# Patient Record
Sex: MALE | Race: WHITE | NOT HISPANIC OR LATINO | Employment: OTHER | URBAN - METROPOLITAN AREA
[De-identification: names, ages, dates, MRNs, and addresses within clinical notes are randomized per-mention and may not be internally consistent; named-entity substitution may affect disease eponyms.]

---

## 2017-01-10 ENCOUNTER — APPOINTMENT (EMERGENCY)
Dept: CT IMAGING | Facility: HOSPITAL | Age: 32
End: 2017-01-10
Payer: COMMERCIAL

## 2017-01-10 ENCOUNTER — HOSPITAL ENCOUNTER (EMERGENCY)
Facility: HOSPITAL | Age: 32
Discharge: HOME | End: 2017-01-10
Attending: EMERGENCY MEDICINE | Admitting: EMERGENCY MEDICINE
Payer: COMMERCIAL

## 2017-01-10 VITALS
HEART RATE: 84 BPM | DIASTOLIC BLOOD PRESSURE: 85 MMHG | TEMPERATURE: 98.5 F | RESPIRATION RATE: 16 BRPM | WEIGHT: 241.4 LBS | OXYGEN SATURATION: 97 % | SYSTOLIC BLOOD PRESSURE: 144 MMHG | BODY MASS INDEX: 32.74 KG/M2

## 2017-01-10 DIAGNOSIS — S01.91XA LACERATION OF HEAD: Primary | ICD-10-CM

## 2017-01-10 PROCEDURE — 90715 TDAP VACCINE 7 YRS/> IM: CPT | Performed by: FAMILY MEDICINE

## 2017-01-10 PROCEDURE — 90471 IMMUNIZATION ADMIN: CPT

## 2017-01-10 PROCEDURE — 70450 CT HEAD/BRAIN W/O DYE: CPT

## 2017-01-10 PROCEDURE — 99284 EMERGENCY DEPT VISIT MOD MDM: CPT

## 2017-01-10 RX ORDER — ACETAMINOPHEN 325 MG/1
975 TABLET ORAL ONCE
Status: DISCONTINUED | OUTPATIENT
Start: 2017-01-10 | End: 2017-01-10

## 2017-01-10 RX ORDER — LIDOCAINE HYDROCHLORIDE 10 MG/ML
10 INJECTION, SOLUTION EPIDURAL; INFILTRATION; INTRACAUDAL; PERINEURAL ONCE
Status: COMPLETED | OUTPATIENT
Start: 2017-01-10 | End: 2017-01-10

## 2017-01-10 RX ADMIN — TETANUS TOXOID, REDUCED DIPHTHERIA TOXOID AND ACELLULAR PERTUSSIS VACCINE, ADSORBED 0.5 ML: 5; 2.5; 8; 8; 2.5 SUSPENSION INTRAMUSCULAR at 14:10

## 2017-01-10 RX ADMIN — LIDOCAINE HYDROCHLORIDE 10 ML: 10 INJECTION, SOLUTION EPIDURAL; INFILTRATION; INTRACAUDAL; PERINEURAL at 13:33

## 2017-01-16 ENCOUNTER — HOSPITAL ENCOUNTER (EMERGENCY)
Facility: HOSPITAL | Age: 32
Discharge: HOME | End: 2017-01-16
Attending: EMERGENCY MEDICINE
Payer: COMMERCIAL

## 2017-01-16 ENCOUNTER — APPOINTMENT (EMERGENCY)
Dept: CT IMAGING | Facility: HOSPITAL | Age: 32
End: 2017-01-16
Payer: COMMERCIAL

## 2017-01-16 VITALS
HEART RATE: 91 BPM | TEMPERATURE: 99.5 F | SYSTOLIC BLOOD PRESSURE: 141 MMHG | RESPIRATION RATE: 20 BRPM | DIASTOLIC BLOOD PRESSURE: 81 MMHG | OXYGEN SATURATION: 97 %

## 2017-01-16 DIAGNOSIS — S09.90XA HEAD INJURY, ACUTE, WITHOUT LOSS OF CONSCIOUSNESS, INITIAL ENCOUNTER: Primary | ICD-10-CM

## 2017-01-16 PROCEDURE — 99283 EMERGENCY DEPT VISIT LOW MDM: CPT

## 2017-01-16 PROCEDURE — 70450 CT HEAD/BRAIN W/O DYE: CPT

## 2017-01-16 RX ORDER — ONDANSETRON 4 MG/1
4 TABLET, ORALLY DISINTEGRATING ORAL ONCE
Status: COMPLETED | OUTPATIENT
Start: 2017-01-16 | End: 2017-01-16

## 2017-01-16 RX ORDER — AMLODIPINE BESYLATE 5 MG/1
5 TABLET ORAL DAILY
COMMUNITY
End: 2018-06-05 | Stop reason: SDUPTHER

## 2017-01-16 RX ORDER — LEVOMEFOLATE CALCIUM 15 MG
15 TABLET ORAL DAILY
COMMUNITY
End: 2018-09-25 | Stop reason: SDUPTHER

## 2017-01-16 RX ADMIN — ONDANSETRON 4 MG: 4 TABLET, ORALLY DISINTEGRATING ORAL at 12:53

## 2017-01-26 ENCOUNTER — ALLSCRIPTS OFFICE VISIT (OUTPATIENT)
Dept: OTHER | Facility: OTHER | Age: 32
End: 2017-01-26

## 2017-02-04 ENCOUNTER — HOSPITAL ENCOUNTER (EMERGENCY)
Facility: HOSPITAL | Age: 32
Discharge: HOME | End: 2017-02-04
Admitting: EMERGENCY MEDICINE
Payer: COMMERCIAL

## 2017-02-04 VITALS
WEIGHT: 225 LBS | OXYGEN SATURATION: 96 % | TEMPERATURE: 98.9 F | BODY MASS INDEX: 30.52 KG/M2 | SYSTOLIC BLOOD PRESSURE: 176 MMHG | DIASTOLIC BLOOD PRESSURE: 119 MMHG | HEART RATE: 103 BPM | RESPIRATION RATE: 18 BRPM

## 2017-02-04 DIAGNOSIS — S01.112A LACERATION OF LEFT EYEBROW, INITIAL ENCOUNTER: Primary | ICD-10-CM

## 2017-02-04 PROCEDURE — 99283 EMERGENCY DEPT VISIT LOW MDM: CPT

## 2017-02-04 RX ORDER — LIDOCAINE HYDROCHLORIDE 10 MG/ML
5 INJECTION, SOLUTION EPIDURAL; INFILTRATION; INTRACAUDAL; PERINEURAL ONCE
Status: COMPLETED | OUTPATIENT
Start: 2017-02-04 | End: 2017-02-04

## 2017-02-04 RX ADMIN — LIDOCAINE HYDROCHLORIDE 5 ML: 10 INJECTION, SOLUTION EPIDURAL; INFILTRATION; INTRACAUDAL; PERINEURAL at 18:11

## 2017-02-14 ENCOUNTER — ALLSCRIPTS OFFICE VISIT (OUTPATIENT)
Dept: OTHER | Facility: OTHER | Age: 32
End: 2017-02-14

## 2017-02-21 ENCOUNTER — GENERIC CONVERSION - ENCOUNTER (OUTPATIENT)
Dept: OTHER | Facility: OTHER | Age: 32
End: 2017-02-21

## 2017-03-01 ENCOUNTER — ALLSCRIPTS OFFICE VISIT (OUTPATIENT)
Dept: OTHER | Facility: OTHER | Age: 32
End: 2017-03-01

## 2017-05-17 ENCOUNTER — OFFICE VISIT (OUTPATIENT)
Dept: URGENT CARE | Facility: CLINIC | Age: 32
End: 2017-05-17
Payer: COMMERCIAL

## 2017-05-17 ENCOUNTER — HOSPITAL ENCOUNTER (OUTPATIENT)
Dept: RADIOLOGY | Facility: CLINIC | Age: 32
Discharge: HOME | End: 2017-05-17
Admitting: EMERGENCY MEDICINE
Payer: COMMERCIAL

## 2017-05-17 DIAGNOSIS — R05.9 COUGH: ICD-10-CM

## 2017-05-17 PROCEDURE — 71020 HB CHEST X-RAY 2VW FRONTAL&LATL: CPT

## 2017-05-17 PROCEDURE — 99283 EMERGENCY DEPT VISIT LOW MDM: CPT

## 2017-05-17 PROCEDURE — G0382 LEV 3 HOSP TYPE B ED VISIT: HCPCS

## 2017-06-22 ENCOUNTER — ALLSCRIPTS OFFICE VISIT (OUTPATIENT)
Dept: OTHER | Facility: OTHER | Age: 32
End: 2017-06-22

## 2017-07-18 ENCOUNTER — ALLSCRIPTS OFFICE VISIT (OUTPATIENT)
Dept: OTHER | Facility: OTHER | Age: 32
End: 2017-07-18

## 2017-08-25 ENCOUNTER — ALLSCRIPTS OFFICE VISIT (OUTPATIENT)
Dept: OTHER | Facility: OTHER | Age: 32
End: 2017-08-25

## 2017-08-29 ENCOUNTER — APPOINTMENT (EMERGENCY)
Dept: RADIOLOGY | Facility: HOSPITAL | Age: 32
End: 2017-08-29
Payer: COMMERCIAL

## 2017-08-29 ENCOUNTER — HOSPITAL ENCOUNTER (EMERGENCY)
Facility: HOSPITAL | Age: 32
Discharge: HOME | End: 2017-08-30
Attending: EMERGENCY MEDICINE
Payer: COMMERCIAL

## 2017-08-29 DIAGNOSIS — F31.9 BIPOLAR 1 DISORDER (HCC): Primary | ICD-10-CM

## 2017-08-29 DIAGNOSIS — F91.3 OPPOSITIONAL DEFIANT DISORDER: ICD-10-CM

## 2017-08-29 DIAGNOSIS — Z72.89 SELF-INJURIOUS BEHAVIOR: ICD-10-CM

## 2017-08-29 DIAGNOSIS — S09.90XA MILD CLOSED HEAD INJURY, INITIAL ENCOUNTER: ICD-10-CM

## 2017-08-29 DIAGNOSIS — F79 MENTAL RETARDATION: ICD-10-CM

## 2017-08-29 DIAGNOSIS — S01.91XA LACERATION OF HEAD: ICD-10-CM

## 2017-08-29 LAB
AMORPH URATE CRY URNS QL MICRO: ABNORMAL /HPF
ANION GAP SERPL CALCULATED.3IONS-SCNC: 10 MMOL/L (ref 4–13)
BACTERIA UR QL AUTO: ABNORMAL /HPF
BASOPHILS # BLD AUTO: 0.04 THOUSANDS/ΜL (ref 0–0.1)
BASOPHILS NFR BLD AUTO: 0 % (ref 0–1)
BILIRUB UR QL STRIP: NEGATIVE
BUN SERPL-MCNC: 13 MG/DL (ref 5–25)
CALCIUM SERPL-MCNC: 9.3 MG/DL (ref 8.3–10.1)
CHLORIDE SERPL-SCNC: 106 MMOL/L (ref 100–108)
CLARITY UR: CLEAR
CO2 SERPL-SCNC: 27 MMOL/L (ref 21–32)
COLOR UR: YELLOW
CREAT SERPL-MCNC: 0.86 MG/DL (ref 0.6–1.3)
EOSINOPHIL # BLD AUTO: 0.1 THOUSAND/ΜL (ref 0–0.61)
EOSINOPHIL NFR BLD AUTO: 1 % (ref 0–6)
ERYTHROCYTE [DISTWIDTH] IN BLOOD BY AUTOMATED COUNT: 13.8 % (ref 11.6–15.1)
GFR SERPL CREATININE-BSD FRML MDRD: 116 ML/MIN/1.73SQ M
GLUCOSE SERPL-MCNC: 118 MG/DL (ref 65–140)
GLUCOSE UR STRIP-MCNC: NEGATIVE MG/DL
HCT VFR BLD AUTO: 43.7 % (ref 36.5–49.3)
HGB BLD-MCNC: 14.5 G/DL (ref 12–17)
HGB UR QL STRIP.AUTO: ABNORMAL
KETONES UR STRIP-MCNC: NEGATIVE MG/DL
LACTATE SERPL-SCNC: 1.3 MMOL/L (ref 0.5–2)
LEUKOCYTE ESTERASE UR QL STRIP: NEGATIVE
LYMPHOCYTES # BLD AUTO: 2.79 THOUSANDS/ΜL (ref 0.6–4.47)
LYMPHOCYTES NFR BLD AUTO: 28 % (ref 14–44)
MCH RBC QN AUTO: 30.1 PG (ref 26.8–34.3)
MCHC RBC AUTO-ENTMCNC: 33.2 G/DL (ref 31.4–37.4)
MCV RBC AUTO: 91 FL (ref 82–98)
MONOCYTES # BLD AUTO: 0.98 THOUSAND/ΜL (ref 0.17–1.22)
MONOCYTES NFR BLD AUTO: 10 % (ref 4–12)
MUCOUS THREADS UR QL AUTO: ABNORMAL
NEUTROPHILS # BLD AUTO: 6.17 THOUSANDS/ΜL (ref 1.85–7.62)
NEUTS SEG NFR BLD AUTO: 61 % (ref 43–75)
NITRITE UR QL STRIP: NEGATIVE
NON-SQ EPI CELLS URNS QL MICRO: ABNORMAL /HPF
NRBC BLD AUTO-RTO: 0 /100 WBCS
PH UR STRIP.AUTO: 6 [PH] (ref 4.5–8)
PLATELET # BLD AUTO: 222 THOUSANDS/UL (ref 149–390)
PMV BLD AUTO: 12.3 FL (ref 8.9–12.7)
POTASSIUM SERPL-SCNC: 3.8 MMOL/L (ref 3.5–5.3)
PROT UR STRIP-MCNC: NEGATIVE MG/DL
RBC # BLD AUTO: 4.81 MILLION/UL (ref 3.88–5.62)
RBC #/AREA URNS AUTO: ABNORMAL /HPF
SODIUM SERPL-SCNC: 143 MMOL/L (ref 136–145)
SP GR UR STRIP.AUTO: 1.01 (ref 1–1.03)
UROBILINOGEN UR QL STRIP.AUTO: 0.2 E.U./DL
WBC # BLD AUTO: 10.08 THOUSAND/UL (ref 4.31–10.16)
WBC #/AREA URNS AUTO: ABNORMAL /HPF

## 2017-08-29 PROCEDURE — 96360 HYDRATION IV INFUSION INIT: CPT

## 2017-08-29 PROCEDURE — 36415 COLL VENOUS BLD VENIPUNCTURE: CPT | Performed by: EMERGENCY MEDICINE

## 2017-08-29 PROCEDURE — 85025 COMPLETE CBC W/AUTO DIFF WBC: CPT | Performed by: EMERGENCY MEDICINE

## 2017-08-29 PROCEDURE — 71020 HB CHEST X-RAY 2VW FRONTAL&LATL: CPT

## 2017-08-29 PROCEDURE — 83605 ASSAY OF LACTIC ACID: CPT | Performed by: EMERGENCY MEDICINE

## 2017-08-29 PROCEDURE — 96361 HYDRATE IV INFUSION ADD-ON: CPT

## 2017-08-29 PROCEDURE — 81002 URINALYSIS NONAUTO W/O SCOPE: CPT | Performed by: EMERGENCY MEDICINE

## 2017-08-29 PROCEDURE — 81001 URINALYSIS AUTO W/SCOPE: CPT

## 2017-08-29 PROCEDURE — 80048 BASIC METABOLIC PNL TOTAL CA: CPT | Performed by: EMERGENCY MEDICINE

## 2017-08-29 RX ORDER — ACETAMINOPHEN 325 MG/1
650 TABLET ORAL ONCE
Status: COMPLETED | OUTPATIENT
Start: 2017-08-29 | End: 2017-08-29

## 2017-08-29 RX ORDER — OLANZAPINE 10 MG/1
10 TABLET ORAL 2 TIMES DAILY
COMMUNITY
End: 2018-09-25 | Stop reason: ALTCHOICE

## 2017-08-29 RX ORDER — POLYETHYLENE GLYCOL 3350 17 G/17G
17 POWDER, FOR SOLUTION ORAL DAILY
COMMUNITY
End: 2020-04-16

## 2017-08-29 RX ORDER — MINOCYCLINE HYDROCHLORIDE 100 MG/1
100 TABLET ORAL DAILY
COMMUNITY
End: 2018-09-25 | Stop reason: SDUPTHER

## 2017-08-29 RX ADMIN — ACETAMINOPHEN 650 MG: 325 TABLET, FILM COATED ORAL at 22:18

## 2017-08-29 RX ADMIN — SODIUM CHLORIDE 1000 ML: 0.9 INJECTION, SOLUTION INTRAVENOUS at 22:33

## 2017-08-30 VITALS
SYSTOLIC BLOOD PRESSURE: 125 MMHG | HEART RATE: 90 BPM | RESPIRATION RATE: 16 BRPM | TEMPERATURE: 98.6 F | OXYGEN SATURATION: 100 % | DIASTOLIC BLOOD PRESSURE: 92 MMHG

## 2017-08-30 PROCEDURE — 99285 EMERGENCY DEPT VISIT HI MDM: CPT

## 2017-09-01 ENCOUNTER — ALLSCRIPTS OFFICE VISIT (OUTPATIENT)
Dept: OTHER | Facility: OTHER | Age: 32
End: 2017-09-01

## 2017-09-29 ENCOUNTER — GENERIC CONVERSION - ENCOUNTER (OUTPATIENT)
Dept: OTHER | Facility: OTHER | Age: 32
End: 2017-09-29

## 2017-10-17 ENCOUNTER — GENERIC CONVERSION - ENCOUNTER (OUTPATIENT)
Dept: OTHER | Facility: OTHER | Age: 32
End: 2017-10-17

## 2017-12-21 ENCOUNTER — GENERIC CONVERSION - ENCOUNTER (OUTPATIENT)
Dept: OTHER | Facility: OTHER | Age: 32
End: 2017-12-21

## 2018-01-12 VITALS
SYSTOLIC BLOOD PRESSURE: 132 MMHG | TEMPERATURE: 99.9 F | WEIGHT: 237 LBS | BODY MASS INDEX: 35.1 KG/M2 | RESPIRATION RATE: 18 BRPM | HEART RATE: 78 BPM | HEIGHT: 69 IN | DIASTOLIC BLOOD PRESSURE: 80 MMHG

## 2018-01-12 VITALS
RESPIRATION RATE: 18 BRPM | HEART RATE: 98 BPM | HEIGHT: 69 IN | SYSTOLIC BLOOD PRESSURE: 122 MMHG | DIASTOLIC BLOOD PRESSURE: 80 MMHG | WEIGHT: 244.38 LBS | TEMPERATURE: 98.7 F | BODY MASS INDEX: 36.2 KG/M2

## 2018-01-13 VITALS
HEART RATE: 86 BPM | RESPIRATION RATE: 20 BRPM | DIASTOLIC BLOOD PRESSURE: 86 MMHG | TEMPERATURE: 99.1 F | HEIGHT: 69 IN | BODY MASS INDEX: 35.25 KG/M2 | WEIGHT: 238 LBS | SYSTOLIC BLOOD PRESSURE: 122 MMHG

## 2018-01-13 VITALS
WEIGHT: 232.5 LBS | BODY MASS INDEX: 34.44 KG/M2 | HEIGHT: 69 IN | SYSTOLIC BLOOD PRESSURE: 136 MMHG | TEMPERATURE: 99.5 F | RESPIRATION RATE: 14 BRPM | HEART RATE: 80 BPM | DIASTOLIC BLOOD PRESSURE: 80 MMHG

## 2018-01-13 VITALS
DIASTOLIC BLOOD PRESSURE: 88 MMHG | HEART RATE: 72 BPM | HEIGHT: 69 IN | BODY MASS INDEX: 36.14 KG/M2 | RESPIRATION RATE: 16 BRPM | TEMPERATURE: 99 F | WEIGHT: 244 LBS | SYSTOLIC BLOOD PRESSURE: 130 MMHG

## 2018-01-14 VITALS
HEART RATE: 78 BPM | BODY MASS INDEX: 32.44 KG/M2 | SYSTOLIC BLOOD PRESSURE: 130 MMHG | HEIGHT: 69 IN | RESPIRATION RATE: 18 BRPM | TEMPERATURE: 100.1 F | DIASTOLIC BLOOD PRESSURE: 80 MMHG | WEIGHT: 219 LBS

## 2018-01-14 VITALS
RESPIRATION RATE: 16 BRPM | SYSTOLIC BLOOD PRESSURE: 120 MMHG | BODY MASS INDEX: 36.05 KG/M2 | WEIGHT: 243.38 LBS | DIASTOLIC BLOOD PRESSURE: 74 MMHG | TEMPERATURE: 99.4 F | HEART RATE: 80 BPM | HEIGHT: 69 IN

## 2018-01-15 NOTE — MISCELLANEOUS
September 29, 2017      [address] [place] [address]  Fax: [phone]    Re:  Head & Shoulder Classic Clean 1% External Shampoo    Please update patient's medication profile  Order should read apply directions at  8:00am as needed for seborrhea capitis  NUNU Martinez        Electronically signed Tonya Nix   Sep 29 2017  9:56AM EST

## 2018-01-18 ENCOUNTER — ALLSCRIPTS OFFICE VISIT (OUTPATIENT)
Dept: OTHER | Facility: OTHER | Age: 33
End: 2018-01-18

## 2018-01-22 VITALS
DIASTOLIC BLOOD PRESSURE: 80 MMHG | BODY MASS INDEX: 36.32 KG/M2 | HEIGHT: 69 IN | WEIGHT: 245.25 LBS | SYSTOLIC BLOOD PRESSURE: 118 MMHG | RESPIRATION RATE: 16 BRPM | HEART RATE: 74 BPM | TEMPERATURE: 97.9 F

## 2018-01-22 VITALS — SYSTOLIC BLOOD PRESSURE: 128 MMHG | DIASTOLIC BLOOD PRESSURE: 80 MMHG

## 2018-01-24 VITALS
WEIGHT: 243.38 LBS | HEIGHT: 69 IN | RESPIRATION RATE: 18 BRPM | BODY MASS INDEX: 36.05 KG/M2 | SYSTOLIC BLOOD PRESSURE: 122 MMHG | HEART RATE: 82 BPM | DIASTOLIC BLOOD PRESSURE: 84 MMHG | TEMPERATURE: 99.4 F

## 2018-02-02 DIAGNOSIS — R05.9 COUGH: Primary | ICD-10-CM

## 2018-02-12 DIAGNOSIS — K21.9 GASTROESOPHAGEAL REFLUX DISEASE, ESOPHAGITIS PRESENCE NOT SPECIFIED: Primary | ICD-10-CM

## 2018-02-13 DIAGNOSIS — K21.9 GASTROESOPHAGEAL REFLUX DISEASE WITHOUT ESOPHAGITIS: Primary | ICD-10-CM

## 2018-02-15 RX ORDER — CALCIUM CARBONATE 200(500)MG
TABLET,CHEWABLE ORAL 4 TIMES DAILY
Refills: 0 | Status: CANCELLED | OUTPATIENT
Start: 2018-02-15

## 2018-02-16 DIAGNOSIS — K21.9 GASTROESOPHAGEAL REFLUX DISEASE, ESOPHAGITIS PRESENCE NOT SPECIFIED: Primary | ICD-10-CM

## 2018-02-16 RX ORDER — CALCIUM CARBONATE 200(500)MG
2 TABLET,CHEWABLE ORAL 4 TIMES DAILY
Qty: 240 TABLET | Refills: 0 | Status: SHIPPED | OUTPATIENT
Start: 2018-02-16 | End: 2018-03-18

## 2018-02-20 RX ORDER — CALCIUM CARBONATE 500 MG/1
TABLET, CHEWABLE ORAL
Qty: 150 TABLET | Refills: 4 | Status: SHIPPED | OUTPATIENT
Start: 2018-02-20 | End: 2018-05-01 | Stop reason: ALTCHOICE

## 2018-03-23 DIAGNOSIS — K21.9 GASTROESOPHAGEAL REFLUX DISEASE, ESOPHAGITIS PRESENCE NOT SPECIFIED: Primary | ICD-10-CM

## 2018-03-23 RX ORDER — OMEPRAZOLE 20 MG/1
20 CAPSULE, DELAYED RELEASE ORAL DAILY
Qty: 31 CAPSULE | Refills: 4 | Status: SHIPPED | OUTPATIENT
Start: 2018-03-23 | End: 2018-07-26 | Stop reason: SDUPTHER

## 2018-04-13 ENCOUNTER — TELEPHONE (OUTPATIENT)
Dept: FAMILY MEDICINE CLINIC | Facility: CLINIC | Age: 33
End: 2018-04-13

## 2018-04-13 NOTE — TELEPHONE ENCOUNTER
Voicemail:    Alexandria Stoner requesting refill     Sunscreen Apply as directed to prevent sunburn every 2 hours (4 to 5 times a day as needed)    Please advise  Thank you
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
## 2018-04-18 NOTE — TELEPHONE ENCOUNTER
Group Home calling again, they are in need of sunscreen order to take patient outdoors  Entered order, please sign off

## 2018-04-24 ENCOUNTER — OFFICE VISIT (OUTPATIENT)
Dept: FAMILY MEDICINE CLINIC | Facility: CLINIC | Age: 33
End: 2018-04-24
Payer: COMMERCIAL

## 2018-04-24 VITALS
HEIGHT: 69 IN | RESPIRATION RATE: 18 BRPM | HEART RATE: 78 BPM | BODY MASS INDEX: 36.43 KG/M2 | WEIGHT: 246 LBS | TEMPERATURE: 98.4 F | DIASTOLIC BLOOD PRESSURE: 70 MMHG | SYSTOLIC BLOOD PRESSURE: 127 MMHG

## 2018-04-24 DIAGNOSIS — F31.70 BIPOLAR DISORDER IN PARTIAL REMISSION, MOST RECENT EPISODE UNSPECIFIED TYPE (HCC): ICD-10-CM

## 2018-04-24 DIAGNOSIS — I10 ESSENTIAL HYPERTENSION: Primary | ICD-10-CM

## 2018-04-24 DIAGNOSIS — K59.00 CONSTIPATION, UNSPECIFIED CONSTIPATION TYPE: ICD-10-CM

## 2018-04-24 PROBLEM — F31.9 BIPOLAR DISORDER (HCC): Status: ACTIVE | Noted: 2018-04-24

## 2018-04-24 PROCEDURE — 99213 OFFICE O/P EST LOW 20 MIN: CPT | Performed by: FAMILY MEDICINE

## 2018-04-24 NOTE — PROGRESS NOTES
Susy Arauz 1985 male MRN: [id number]    Family Medicine Follow-up Visit    ASSESSMENT/PLAN  30 yo M with the followin  Essential hypertension -  Well controlled on amlodipine 5 mg daily  Continue with current therapy  Advised on healthy diet and exercising  2  Bipolar disorder in partial remission, most recent episode unspecified type ([address] ) -  Controlled with Zyprexa 10 mg daily and Depakote 500 mg t i d   Followed by psychiatrist at [facility]  3  Constipation, unspecified constipation type - Well controlled  Continue with Colace 100 mg twice daily  4  Return to clinic in about 6 months for continued management of chronic conditions  Form completed and signed  Future Appointments  Date Time Provider Keshav Denae   2018 1:00 PM Melvi Brumfield MD [facility]          SUBJECTIVE  CC: Follow-up (hypertention and constipation)      HPI:  Susy Arauz is a 29 y o  male with intellectual disability who presents for follow-up of chronic conditions including hypertension, bipolar disorder and constipation  When last seen in 2017, he was taking Depakote 500 mg daily and Zyprexa 5 mg daily  Was seen by psychiatrist at [facility] and his therapy had been escalated to Depakote 500 mg t i d  and Zyprexa 10 mg daily  Presents today with 2 of his caregivers who denies any acute concerns  No fevers, chills, headaches, chest pain, shortness of breath, belly pain, nausea or vomiting  Review of Systems   Constitutional: Negative for chills, fatigue and fever  HENT: Negative for ear pain and sore throat  Respiratory: Negative for chest tightness and shortness of breath  Cardiovascular: Negative for chest pain  Gastrointestinal: Negative for abdominal pain, nausea and vomiting  Genitourinary: Negative for dysuria  Skin: Negative for rash  Allergic/Immunologic: Negative for environmental allergies     Neurological: Negative for dizziness and headaches  Historical Information   The patient history was reviewed as follows:    Past Medical History:   Diagnosis Date    ADHD (attention deficit hyperactivity disorder)     Atypical pervasive developmental disorder     Autism     Bipolar disorder ([facility] )     Constipation     Depression     Head-banging     Last Assessed:  9/1/17    Hyperlipidemia     Hypertension     Intermittent explosive disorder     Oppositional defiant disorder     Personality disorder     Schizophrenia ([facility] )     Schizotypal personality disorder     Seizures ([facility] )     Sleep disorder     Vitamin D insufficiency     Last Assessed:  6/22/17     Past Surgical History:   Procedure Laterality Date    NO PAST SURGERIES      UMBILICAL HERNIA REPAIR       History reviewed  No pertinent family history     Social History   History   Alcohol Use No     History   Drug Use No     History   Smoking Status    Never Smoker   Smokeless Tobacco    Never Used       Medications:     Current Outpatient Prescriptions:     acetaminophen (TYLENOL) 325 mg tablet, Take 650 mg by mouth every 6 (six) hours as needed for mild pain , Disp: , Rfl:     amLODIPine (NORVASC) 5 mg tablet, Take 5 mg by mouth daily, Disp: , Rfl:     ammonium lactate (LAC-HYDRIN) 12 % lotion, Apply topically 2 (two) times a day , Disp: , Rfl:     Benzoyl Peroxide (benzoyl peroxide) 10 % LIQD, Apply topically daily, Disp: , Rfl:     divalproex sodium (DEPAKOTE ER) 500 mg 24 hr tablet, Take 2,000 mg by mouth daily at bedtime  , Disp: , Rfl:     docusate sodium (COLACE) 100 mg capsule, Take 100 mg by mouth 2 (two) times a day , Disp: , Rfl:     gabapentin (NEURONTIN) 800 mg tablet, Take 800 mg by mouth 3 (three) times a day  , Disp: , Rfl:     GNP ANTACID 500 MG chewable tablet, CHEW 2 TABLETS BY MOUTH 4 TIMES A DAY AS NEEDED FOR HEARTBURN *GENARO, Disp: 150 tablet, Rfl: 4    L-Methylfolate 15 MG TABS, Take 15 mg by mouth daily, Disp: , Rfl:     lithium carbonate (LITHOBID) 450 mg CR tablet, Take 900 mg by mouth daily at bedtime  , Disp: , Rfl:     Menthol-Ascorbic Acid (LUDENS COUGH DROPS) 3-60 MG LOZG, Apply 3-60 mg to the mouth or throat 4 (four) times a day as needed (cough), Disp: 30 each, Rfl: 0    minocycline (DYNACIN) 100 MG tablet, Take 100 mg by mouth daily, Disp: , Rfl:     OLANZapine (ZyPREXA) 10 mg tablet, Take 10 mg by mouth 2 (two) times a day, Disp: , Rfl:     omeprazole (PriLOSEC) 20 mg delayed release capsule, Take 1 capsule (20 mg total) by mouth daily, Disp: 31 capsule, Rfl: 4    polyethylene glycol (MIRALAX) 17 g packet, Take 17 g by mouth daily, Disp: , Rfl:     Sunscreens (SUNBLOCK SPF30) LOTN, Apply every 2 hours up to five times daily, Disp: 1 Bottle, Rfl: 5    TRETINOIN EX, Apply topically  To face 0 05%, Disp: , Rfl:   No Known Allergies    OBJECTIVE    Vitals:   Vitals:    04/24/18 1259   BP: 127/70   Pulse: 78   Resp: 18   Temp: 98 4 °F (36 9 °C)   Weight: 112 kg (246 lb)   Height: 5' 9" (1 753 m)       Physical Exam   Constitutional: He appears well-developed and well-nourished  No distress  HENT:   Head: Atraumatic  Right Ear: External ear normal    Left Ear: External ear normal    Mouth/Throat: Oropharynx is clear and moist    Chronically poor dentition  Keeps mouth open  Old scar on forehead  Eyes: Conjunctivae are normal  Right eye exhibits no discharge  Left eye exhibits no discharge  No scleral icterus  Chronic is entropion  Neck: Normal range of motion  No tracheal deviation present  Cardiovascular: Normal rate and normal heart sounds  Pulmonary/Chest: Effort normal and breath sounds normal  No respiratory distress  He has no wheezes  He has no rales  Abdominal: Soft  He exhibits no distension  There is no tenderness  Musculoskeletal: He exhibits no edema  Neurological: He is alert  Skin: Skin is warm  No rash noted  He is not diaphoretic  No erythema     Psychiatric: He has a normal mood and affect  His behavior is normal  Judgment and thought content normal    Appears cheerful

## 2018-04-30 ENCOUNTER — TELEPHONE (OUTPATIENT)
Dept: FAMILY MEDICINE CLINIC | Facility: CLINIC | Age: 33
End: 2018-04-30

## 2018-04-30 NOTE — TELEPHONE ENCOUNTER
PT scheduled fo oral surgery on 5/16/18   They need a hold on his AM  medications faxed to [phone]  He takes his medications in applesauce and is not supposed to eat prior to surgery

## 2018-05-01 DIAGNOSIS — K21.9 GASTROESOPHAGEAL REFLUX DISEASE, ESOPHAGITIS PRESENCE NOT SPECIFIED: ICD-10-CM

## 2018-05-03 ENCOUNTER — DOCUMENTATION (OUTPATIENT)
Dept: FAMILY MEDICINE CLINIC | Facility: CLINIC | Age: 33
End: 2018-05-03

## 2018-05-03 NOTE — TELEPHONE ENCOUNTER
The facility did not receive the note  I cannot locate the note I will be happy to resend if you can show me or tell me what you provided to the facility    Thank you

## 2018-05-04 NOTE — TELEPHONE ENCOUNTER
Did any one find the order Dr Willoughby Patient wrote in a communication  Everyone was lookig for it in the faxes yesterday

## 2018-05-07 NOTE — TELEPHONE ENCOUNTER
Spoke with caretaker, patient takes his meds with applesauce, therefore they will need a note to hold morning meds until after dental extractions  He will be having anesthesia  Note to be faxed to [phone]  I did a note to d/c oral antacids, in your bin for sign off

## 2018-05-07 NOTE — TELEPHONE ENCOUNTER
Patient's caregiver calling again concerning a note to MARILYN moreno medication list  Patient will be going for teeth abstractions was recently seen on 04/24/18  Also stopping following,  AM meds for 05/16/18 only and resuming the next dosage     Amlodipine      Bipin Hardin   Caller can be reached at provide number if any additional questions

## 2018-05-08 NOTE — TELEPHONE ENCOUNTER
Hi, note was signed and faxed to the number written at the top and then placed in the scan bin  Thanks   -C

## 2018-05-08 NOTE — TELEPHONE ENCOUNTER
I have also created a new note instructing him to hold all meds the morning of the procedure  Thanks

## 2018-05-14 ENCOUNTER — TELEPHONE (OUTPATIENT)
Dept: FAMILY MEDICINE CLINIC | Facility: CLINIC | Age: 33
End: 2018-05-14

## 2018-05-14 NOTE — TELEPHONE ENCOUNTER
Coral Means called, pt is scheduled for Wednesday under general anesthesia  Appt scheduled for tomorrow morning with Dr Treasure Lofton  Informed Coral Means that if paperwork needs to be filled out that she needs to bring it tomorrow

## 2018-05-14 NOTE — TELEPHONE ENCOUNTER
Amanda Rea from director supports came in for the letter written by Dr Ted Henderson regarding stopping pt medication because pt is going to have oral surgery  On 5/16/18,  In the letter  indicates that he does not have to stop medication , but Amanda Rea mentioned Clarence Ruano the medical director has called us several times stating he needs to stop the medications prior to oral surgery due to pt needs to take meds with applesauce and he cant have anything to eat or drink  Please change note to its ok for meds to be held and he is clear for the procedure  Any questions please contact Yani Whitaker or Clarence Ruano   Thanks

## 2018-05-15 ENCOUNTER — OFFICE VISIT (OUTPATIENT)
Dept: FAMILY MEDICINE CLINIC | Facility: CLINIC | Age: 33
End: 2018-05-15

## 2018-05-15 VITALS
DIASTOLIC BLOOD PRESSURE: 70 MMHG | RESPIRATION RATE: 16 BRPM | HEART RATE: 88 BPM | BODY MASS INDEX: 36.29 KG/M2 | HEIGHT: 69 IN | SYSTOLIC BLOOD PRESSURE: 130 MMHG | WEIGHT: 245 LBS | TEMPERATURE: 98.4 F

## 2018-05-15 DIAGNOSIS — K08.9 POOR DENTITION: Primary | ICD-10-CM

## 2018-05-15 DIAGNOSIS — Z01.818 PREOP EXAMINATION: ICD-10-CM

## 2018-05-15 PROCEDURE — PREOP: Performed by: FAMILY MEDICINE

## 2018-05-15 RX ORDER — LORAZEPAM 0.5 MG/1
0.5 TABLET ORAL
COMMUNITY
End: 2019-09-11

## 2018-05-15 NOTE — LETTER
May 15, 2018     Patient: Maxx Waters   YOB: 1985   Date of Visit: 5/15/2018       To Whom it May Concern:    Victorino Aldridge is under my professional care  He was seen in my office on 5/15/2018  Maxx Waters is undergoing a Minimal risk surgery 5/16/18  He is at very Low Risk risk for major adverse cardiac event (MACE)  He may proceed with surgery/dental extraction under anesthesia as planned without further cardiac workup  He may hold his morning home medications before the procedure and restart after the procedure as appropriate  If you have any questions or concerns, please don't hesitate to call           Sincerely,          Adriane Fofana DO        CC: No Recipients

## 2018-05-15 NOTE — ASSESSMENT & PLAN NOTE
-patient is going in for dental extraction 5/16/18 under general anesthesia  -Sherryle Blase is undergoing a Minimal risk surgery  He is at very Low Risk risk for major adverse cardiac event (MACE)  He may proceed with surgery as planned without further workup

## 2018-05-15 NOTE — PROGRESS NOTES
Yo Clemente 1985 male MRN: [id number]    Family Medicine Pre-Operative Evaluation      ASSESSMENT/PLAN  Problem List Items Addressed This Visit     Poor dentition - Primary     -patient is going in for dental extraction 5/16/18 under general anesthesia  -Yo Clemente is undergoing a Minimal risk surgery  He is at very Low Risk risk for major adverse cardiac event (MACE)  He may proceed with surgery as planned without further workup  Other Visit Diagnoses     Preop examination        Yo Clemente is undergoing a Minimal risk surgery  He is at very Low Risk risk for major adverse cardiac event (MACE)  He may proceed with surgery as planned without further cardiac evaluation  Forms for group home completed, copied, and given to staff  No pre-op form available from dentist  Letter explaining that he is low risk for procedure, may proceed, and may hold morning medications typed and provided to patient  SUBJECTIVE  CC: Pre-op Exam (pt needs clearance for oral surgery)      HPI:  Yo Clemente is a 29 y o  male presenting with his caretaker who is planning to undergo oral surgery (dental extraction) under general on 5/16/18  Patient has not had complications with anesthesia in the past; no h/o of anesthesia in the past per caretaker  Patient normally takes his medications crushed in applesauce, and since he can not have anything by mouth the morning of the procedure, needs a note to clear patient from missing his morning medications  Caretakers deny acute illness, fevers/chills, changes in activity or diet  CAD: no  CHF: no  CVD: no  DM2 on insulin: no  Cr>2: no          Review of Systems   Constitutional: Negative for chills and fever  HENT: Negative for congestion, postnasal drip and sore throat  Respiratory: Negative for cough, choking, shortness of breath and wheezing  Cardiovascular: Negative for chest pain and palpitations     Gastrointestinal: Negative for abdominal pain, diarrhea, nausea and vomiting  Genitourinary: Negative for decreased urine volume and difficulty urinating  Neurological: Negative for dizziness and light-headedness  Historical Information   The patient history was reviewed as follows:    Past Medical History:   Diagnosis Date    ADHD (attention deficit hyperactivity disorder)     Atypical pervasive developmental disorder     Autism     [facility] disorder [facility] )     Constipation     Depression     Head-banging     Last Assessed:  9/1/17    Hyperlipidemia     Hypertension     Intermittent explosive disorder     Oppositional defiant disorder     Personality disorder     Schizophrenia ([facility] )     Schizotypal personality disorder     Seizures ([facility] )     Sleep disorder     Vitamin D insufficiency     Last Assessed:  6/22/17     Past Surgical History:   Procedure Laterality Date    NO PAST SURGERIES      UMBILICAL HERNIA REPAIR       History reviewed  No pertinent family history     Social History       Medications:     Current Outpatient Prescriptions:     acetaminophen (TYLENOL) 325 mg tablet, Take 650 mg by mouth every 6 (six) hours as needed for mild pain , Disp: , Rfl:     amLODIPine (NORVASC) 5 mg tablet, Take 5 mg by mouth daily, Disp: , Rfl:     ammonium lactate (LAC-HYDRIN) 12 % lotion, Apply topically 2 (two) times a day , Disp: , Rfl:     Benzoyl Peroxide (benzoyl peroxide) 10 % LIQD, Apply topically daily, Disp: , Rfl:     divalproex sodium (DEPAKOTE ER) 500 mg 24 hr tablet, Take 2,000 mg by mouth daily at bedtime  , Disp: , Rfl:     docusate sodium (COLACE) 100 mg capsule, Take 100 mg by mouth 2 (two) times a day , Disp: , Rfl:     gabapentin (NEURONTIN) 800 mg tablet, Take 800 mg by mouth 3 (three) times a day  , Disp: , Rfl:     L-Methylfolate 15 MG TABS, Take 15 mg by mouth daily, Disp: , Rfl:     lanolin-mineral oil (BABY OIL) OIL, Apply topically as needed for dry skin, Disp: , Rfl:     lithium carbonate (LITHOBID) 450 mg CR tablet, Take 900 mg by mouth daily at bedtime  , Disp: , Rfl:     LORazepam (ATIVAN) 0 5 mg tablet, Take 0 5 mg by mouth, Disp: , Rfl:     Menthol-Ascorbic Acid (LUDENS COUGH DROPS) 3-60 MG LOZG, Apply 3-60 mg to the mouth or throat 4 (four) times a day as needed (cough), Disp: 30 each, Rfl: 0    minocycline (DYNACIN) 100 MG tablet, Take 100 mg by mouth daily, Disp: , Rfl:     OLANZapine (ZyPREXA) 10 mg tablet, Take 10 mg by mouth 2 (two) times a day, Disp: , Rfl:     omeprazole (PriLOSEC) 20 mg delayed release capsule, Take 1 capsule (20 mg total) by mouth daily, Disp: 31 capsule, Rfl: 4    pyrithione zinc (HEAD AND SHOULDERS) 1 % shampoo, Apply topically daily as needed for dandruff, Disp: , Rfl:     Sunscreens (SUNBLOCK SPF30) LOTN, Apply every 2 hours up to five times daily, Disp: 1 Bottle, Rfl: 5    TRETINOIN EX, Apply topically  To face 0 05%, Disp: , Rfl:     polyethylene glycol (MIRALAX) 17 g packet, Take 17 g by mouth daily, Disp: , Rfl:   No Known Allergies    OBJECTIVE    Vitals:   Vitals:    05/15/18 0952   BP: 130/70   BP Location: Right arm   Patient Position: Sitting   Cuff Size: Large   Pulse: 88   Resp: 16   Temp: 98 4 °F (36 9 °C)   TempSrc: Tympanic   Weight: 111 kg (245 lb)   Height: 5' 9 3" (1 76 m)           Physical Exam   Constitutional: He is oriented to person, place, and time  He appears well-developed and well-nourished  No distress  Overweight     HENT:   Head: Normocephalic and atraumatic  Poor dentition   Eyes: Conjunctivae are normal  Right eye exhibits no discharge  Left eye exhibits no discharge  Neck: Neck supple  Cardiovascular: Normal rate and regular rhythm  No murmur heard  Pulmonary/Chest: Effort normal and breath sounds normal  No respiratory distress  He has no wheezes  He has no rales  Abdominal: Soft  Bowel sounds are normal  He exhibits no distension  There is no tenderness  There is no rebound     Neurological: He is alert and oriented to person, place, and time  Skin: Skin is warm and dry  He is not diaphoretic  Psychiatric:   Baseline cognitive delay  Moderately communicative  Appropriate responses to questions, follows commands and directions  Nursing note and vitals reviewed           Tung Hart DO  [facility] PGY3  5/15/2018  10:29 AM

## 2018-05-21 ENCOUNTER — TELEPHONE (OUTPATIENT)
Dept: FAMILY MEDICINE CLINIC | Facility: CLINIC | Age: 33
End: 2018-05-21

## 2018-05-21 NOTE — TELEPHONE ENCOUNTER
Caregiver calling to clarify how often pt needs bp checked as it has been stable  They would like an order for how often to check bp   [facility]

## 2018-05-21 NOTE — LETTER
May 25, 2018     Cassandra Coulter  [address] [place] [id number]    Patient: Cassandra Coulter   YOB: 1985   Date of Visit: 5/21/2018       To Whom it may concern: Thank you for allowing my to care for [facility]  I have been informed that his blood pressures have persistently well controlled  As such, please do not check blood pressures moving forward except when he complains of chest pain, shortness of breath, dizziness or he develops any acute illness  At that time, you may check once daily and give my office a call if his pressures are abnormal      If you have questions, please do not hesitate to call me            Sincerely,        Heather Rodriguez RN        CC: No Recipients

## 2018-06-05 DIAGNOSIS — I10 ESSENTIAL HYPERTENSION: Primary | ICD-10-CM

## 2018-06-05 DIAGNOSIS — L70.9 ACNE, UNSPECIFIED ACNE TYPE: Primary | ICD-10-CM

## 2018-06-05 RX ORDER — AMLODIPINE BESYLATE 5 MG/1
TABLET ORAL
Qty: 30 TABLET | Refills: 0 | Status: SHIPPED | OUTPATIENT
Start: 2018-06-05 | End: 2018-07-06 | Stop reason: SDUPTHER

## 2018-06-05 RX ORDER — TRETINOIN 0.5 MG/G
CREAM TOPICAL
Qty: 45 G | Refills: 5 | Status: CANCELLED | OUTPATIENT
Start: 2018-06-05

## 2018-06-07 DIAGNOSIS — L30.9 DERMATITIS: ICD-10-CM

## 2018-06-07 DIAGNOSIS — L70.0 ACNE VULGARIS: Primary | ICD-10-CM

## 2018-06-10 RX ORDER — AMMONIUM LACTATE 12 G/100G
LOTION TOPICAL 2 TIMES DAILY
Qty: 400 G | Refills: 2 | Status: SHIPPED | OUTPATIENT
Start: 2018-06-10 | End: 2018-09-25 | Stop reason: SDUPTHER

## 2018-06-10 RX ORDER — BENZOYL PEROXIDE 100 MG/ML
LIQUID TOPICAL
Qty: 237 G | Refills: 2 | Status: SHIPPED | OUTPATIENT
Start: 2018-06-10 | End: 2019-07-17 | Stop reason: SDUPTHER

## 2018-06-11 ENCOUNTER — APPOINTMENT (EMERGENCY)
Dept: RADIOLOGY | Facility: HOSPITAL | Age: 33
End: 2018-06-11
Payer: COMMERCIAL

## 2018-06-11 ENCOUNTER — TELEPHONE (OUTPATIENT)
Dept: FAMILY MEDICINE CLINIC | Facility: CLINIC | Age: 33
End: 2018-06-11

## 2018-06-11 ENCOUNTER — HOSPITAL ENCOUNTER (EMERGENCY)
Facility: HOSPITAL | Age: 33
Discharge: HOME | End: 2018-06-11
Attending: EMERGENCY MEDICINE | Admitting: EMERGENCY MEDICINE
Payer: COMMERCIAL

## 2018-06-11 VITALS
HEART RATE: 81 BPM | DIASTOLIC BLOOD PRESSURE: 84 MMHG | WEIGHT: 244.71 LBS | BODY MASS INDEX: 35.83 KG/M2 | SYSTOLIC BLOOD PRESSURE: 151 MMHG | OXYGEN SATURATION: 97 % | RESPIRATION RATE: 18 BRPM | TEMPERATURE: 98 F

## 2018-06-11 DIAGNOSIS — S09.90XA INJURY OF HEAD, INITIAL ENCOUNTER: Primary | ICD-10-CM

## 2018-06-11 PROBLEM — I10 BENIGN ESSENTIAL HYPERTENSION: Status: ACTIVE | Noted: 2017-02-01

## 2018-06-11 PROBLEM — L21.0 SEBORRHEA CAPITIS: Status: ACTIVE | Noted: 2017-01-26

## 2018-06-11 PROBLEM — K21.9 GERD (GASTROESOPHAGEAL REFLUX DISEASE): Status: ACTIVE | Noted: 2017-02-01

## 2018-06-11 PROBLEM — L70.0 CYSTIC ACNE: Status: ACTIVE | Noted: 2017-01-26

## 2018-06-11 PROBLEM — F98.4 HEAD-BANGING: Status: ACTIVE | Noted: 2017-09-01

## 2018-06-11 PROCEDURE — 70450 CT HEAD/BRAIN W/O DYE: CPT

## 2018-06-11 PROCEDURE — 99284 EMERGENCY DEPT VISIT MOD MDM: CPT

## 2018-06-11 NOTE — TELEPHONE ENCOUNTER
Calling from [facility] long term pharmacy for med clarification on ammonium lactate written as a straight order for 2x's a day, where in the past this has been for 2x a day as needed   [phone]
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
## 2018-06-11 NOTE — ED PROVIDER NOTES
History  Chief Complaint   Patient presents with    Head Injury     Pt has PDD and was at PCP getting evaluated for hitting head yeaterday and he got angry and hit his head off the glass door today  No LOC  Abrasion noted to forehead     30 y/o male with MR presenting to the ER with a chief complaint of repeated head injury  Per EMS and paperwork, patient has a history of hitting his head off objects  Ran into a door yesterday  Patient was at his primary care physician today when he became angry and hit his head on the table  No LOC reported  Nonetheless given the 2 injuries patient was referred to the ER for further evaluation treatment  Patient offers no acute complaints at bedside  History provided by:  Patient   used: No        Prior to Admission Medications   Prescriptions Last Dose Informant Patient Reported? Taking? BENZOYL PEROXIDE 10 % external wash   No No   Sig: USE TO WASH FACE/ SHOULDERS/BACK DAILY AS NEEDED (ACNE)*STOP USING IFEXCESSIVE IRRIT/RED   L-Methylfolate 15 MG TABS  Care Giver Yes No   Sig: Take 15 mg by mouth daily   LORazepam (ATIVAN) 0 5 mg tablet  Care Giver Yes No   Sig: Take 0 5 mg by mouth   Menthol-Ascorbic Acid (LUDENS COUGH DROPS) 3-60 MG LOZG  Care Giver No No   Sig: Apply 3-60 mg to the mouth or throat 4 (four) times a day as needed (cough)   OLANZapine (ZyPREXA) 10 mg tablet  Care Giver Yes No   Sig: Take 10 mg by mouth 2 (two) times a day   OLANZapine (ZyPREXA) 20 MG tablet   Yes No   Sunscreens (SUNBLOCK SPF30) LOTN  Care Giver No No   Sig: Apply every 2 hours up to five times daily   acetaminophen (TYLENOL) 325 mg tablet  Care Giver Yes No   Sig: Take 650 mg by mouth every 6 (six) hours as needed for mild pain     amLODIPine (NORVASC) 5 mg tablet   No No   Sig: TAKE 1 TABLET BY MOUTH ONCE DAILY AT 8AM (HTN)*EMENARI   ammonium lactate (LAC-HYDRIN) 12 % lotion   No No   Sig: Apply topically 2 (two) times a day   divalproex sodium (DEPAKOTE ER) 500 mg 24 hr tablet  Care Giver Yes No   Sig: Take 2,000 mg by mouth daily at bedtime     docusate sodium (COLACE) 100 mg capsule  Care Giver Yes No   Sig: Take 100 mg by mouth 2 (two) times a day    gabapentin (NEURONTIN) 800 mg tablet  Care Giver Yes No   Sig: Take 800 mg by mouth 3 (three) times a day     lanolin-mineral oil (BABY OIL) OIL  Care Giver Yes No   Sig: Apply topically as needed for dry skin   lithium carbonate (LITHOBID) 450 mg CR tablet  Care Giver Yes No   Sig: Take 900 mg by mouth daily at bedtime  minocycline (DYNACIN) 100 MG tablet  Care Giver Yes No   Sig: Take 100 mg by mouth daily   minocycline (MINOCIN) 100 mg capsule   Yes No   omeprazole (PriLOSEC) 20 mg delayed release capsule  Care Giver No No   Sig: Take 1 capsule (20 mg total) by mouth daily   polyethylene glycol (MIRALAX) 17 g packet  Care Giver Yes No   Sig: Take 17 g by mouth daily   pyrithione zinc (HEAD AND SHOULDERS) 1 % shampoo  Care Giver Yes No   Sig: Apply topically daily as needed for dandruff   tretinoin (RETIN-A) 0 025 % cream   No No   Sig: Apply topically daily at bedtime To face 0 05%      Facility-Administered Medications: None       Past Medical History:   Diagnosis Date    ADHD (attention deficit hyperactivity disorder)     Atypical pervasive developmental disorder     Autism     [facility] [address] [facility]     Constipation     Depression     Head-banging     Last Assessed:  9/1/17    Hyperlipidemia     Hypertension     Intermittent explosive disorder     Oppositional defiant disorder     Personality disorder     Schizophrenia [facility] [address] [facility]     Schizotypal personality disorder     Seizures ([facility] [address] [facility]     Sleep disorder     Vitamin D insufficiency     Last Assessed:  6/22/17       Past Surgical History:   Procedure Laterality Date    NO PAST SURGERIES      UMBILICAL HERNIA REPAIR         History reviewed  No pertinent family history  I have reviewed and agree with the history as documented      Social History Substance Use Topics    Smoking status: Never Smoker    Smokeless tobacco: Never Used    Alcohol use No        Review of Systems   Constitutional: Negative for activity change, appetite change, chills, fatigue and fever  HENT: Negative  Eyes: Negative  Respiratory: Negative  Cardiovascular: Negative  Gastrointestinal: Negative for abdominal distention, abdominal pain, blood in stool, constipation, diarrhea, nausea and vomiting  Endocrine: Negative  Genitourinary: Negative for decreased urine volume, difficulty urinating, dysuria, enuresis, flank pain, frequency, hematuria, penile swelling, scrotal swelling, testicular pain and urgency  Musculoskeletal: Negative  Skin: Negative  Allergic/Immunologic: Negative  Neurological: Negative  Hematological: Negative  Psychiatric/Behavioral: Negative  All other systems reviewed and are negative  Physical Exam  ED Triage Vitals [06/11/18 1607]   Temperature Pulse Respirations Blood Pressure SpO2   98 °F (36 7 °C) 81 18 151/84 97 %      Temp Source Heart Rate Source Patient Position - Orthostatic VS BP Location FiO2 (%)   Oral Monitor Sitting Left arm --      Pain Score       8           Orthostatic Vital Signs  Vitals:    06/11/18 1607   BP: 151/84   Pulse: 81   Patient Position - Orthostatic VS: Sitting       Physical Exam   Constitutional: He is oriented to person, place, and time  He appears well-developed and well-nourished  HENT:   Right Ear: External ear normal    Left Ear: External ear normal    Nose: Nose normal    Mouth/Throat: Oropharynx is clear and moist    Abrasion noted midline forehead  Eyes: Conjunctivae and EOM are normal  Pupils are equal, round, and reactive to light  Neck: Normal range of motion  Neck supple  No JVD present  No tracheal deviation present  No thyromegaly present  Cardiovascular: Normal rate, regular rhythm, normal heart sounds and intact distal pulses    Exam reveals no gallop and no friction rub  No murmur heard  Pulmonary/Chest: Effort normal and breath sounds normal  No stridor  No respiratory distress  He has no wheezes  He has no rales  He exhibits no tenderness  Abdominal: Soft  Bowel sounds are normal  He exhibits no distension and no mass  There is no tenderness  There is no rebound and no guarding  No hernia  Musculoskeletal: Normal range of motion  He exhibits no edema, tenderness or deformity  Lymphadenopathy:     He has no cervical adenopathy  Neurological: He is alert and oriented to person, place, and time  He has normal reflexes  He displays normal reflexes  No cranial nerve deficit  He exhibits normal muscle tone  Coordination normal    Skin: Skin is warm  No rash noted  No erythema  No pallor  Psychiatric: He has a normal mood and affect  His behavior is normal  Judgment and thought content normal    Nursing note and vitals reviewed  ED Medications  Medications - No data to display    Diagnostic Studies  Results Reviewed     None                 CT head without contrast   Final Result by Usha Tavares MD (06/11 1722)      No acute intracranial abnormality  Workstation performed: [id number]               Procedures  Procedures      Phone Consults  ED Phone Contact    ED Course  ED Course as of Jun 11 2135 Mon Jun 11, 2018   1752 Patient's caregivers are concerned about patient's repeated self-inflicted head trauma  Would like a crisis evaluation  Will have crisis evaluate patient at bedside  MDM  Number of Diagnoses or Management Options  Injury of head, initial encounter: new and requires workup  Diagnosis management comments: Assessment:  -repeated head injury  Plan:  -given abrasion to forehead and patient has poor recall of events leading to injuries will CT head  Will recommend patient were helmet in the future    Will likely discharge patient       Amount and/or Complexity of Data Reviewed  Clinical lab tests: ordered and reviewed  Tests in the radiology section of CPT®: ordered and reviewed  Tests in the medicine section of CPT®: reviewed and ordered  Decide to obtain previous medical records or to obtain history from someone other than the patient: yes  Independent visualization of images, tracings, or specimens: yes    Patient Progress  Patient progress: stable    CritCare Time    Disposition  Final diagnoses:   Injury of head, initial encounter     Time reflects when diagnosis was documented in both MDM as applicable and the Disposition within this note     Time User Action Codes Description Comment    6/11/2018  5:26 PM Bro Lubin Add [S09 [id number]] Injury of head, initial encounter       ED Disposition     ED Disposition Condition Comment    Discharge  [place] discharge to home/self care      Condition at discharge: Good        MD Documentation      Most Recent Value   Sending MD Dr Akins       Follow-up Information     Follow up With Specialties Details Why [facility], DO Family Medicine Schedule an appointment as soon as possible for a visit  Hossein Lemon 3  [phone]            Discharge Medication List as of 6/11/2018  5:27 PM      CONTINUE these medications which have NOT CHANGED    Details   acetaminophen (TYLENOL) 325 mg tablet Take 650 mg by mouth every 6 (six) hours as needed for mild pain , Historical Med      amLODIPine (NORVASC) 5 mg tablet TAKE 1 TABLET BY MOUTH ONCE DAILY AT 8AM (HTN)*EMENARI, Normal      ammonium lactate (LAC-HYDRIN) 12 % lotion Apply topically 2 (two) times a day, Starting Sun 6/10/2018, Normal      BENZOYL PEROXIDE 10 % external wash USE TO [address] FACE/ SHOULDERS/BACK DAILY AS NEEDED (ACNE)*STOP USING IFEXCESSIVE IRRIT/RED, Normal      divalproex sodium (DEPAKOTE ER) 500 mg 24 hr tablet Take 2,000 mg by mouth daily at bedtime  , Historical Med      docusate sodium (COLACE) 100 mg capsule Take 100 mg by mouth 2 (two) times a day , Historical Med      gabapentin (NEURONTIN) 800 mg tablet Take 800 mg by mouth 3 (three) times a day  , Historical Med      L-Methylfolate 15 MG TABS Take 15 mg by mouth daily, Historical Med      lanolin-mineral oil (BABY OIL) OIL Apply topically as needed for dry skin, Historical Med      lithium carbonate (LITHOBID) 450 mg CR tablet Take 900 mg by mouth daily at bedtime  , Historical Med      LORazepam (ATIVAN) 0 5 mg tablet Take 0 5 mg by mouth, Historical Med      Menthol-Ascorbic Acid (LUDENS COUGH DROPS) 3-60 MG LOZG Apply 3-60 mg to the mouth or throat 4 (four) times a day as needed (cough), Starting Fri 2/2/2018, Normal      minocycline (DYNACIN) 100 MG tablet Take 100 mg by mouth daily, Historical Med      minocycline (MINOCIN) 100 mg capsule Starting Fri 5/18/2018, Historical Med      !! OLANZapine (ZyPREXA) 10 mg tablet Take 10 mg by mouth 2 (two) times a day, Historical Med      !! OLANZapine (ZyPREXA) 20 MG tablet Starting Sat 5/26/2018, Historical Med      omeprazole (PriLOSEC) 20 mg delayed release capsule Take 1 capsule (20 mg total) by mouth daily, Starting Fri 3/23/2018, Normal      polyethylene glycol (MIRALAX) 17 g packet Take 17 g by mouth daily, Historical Med      pyrithione zinc (HEAD AND SHOULDERS) 1 % shampoo Apply topically daily as needed for dandruff, Historical Med      Sunscreens (SUNBLOCK SPF30) LOTN Apply every 2 hours up to five times daily, Normal      tretinoin (RETIN-A) 0 025 % cream Apply topically daily at bedtime To face 0 05%, Starting Sun 6/10/2018, Normal       !! - Potential duplicate medications found  Please discuss with provider  No discharge procedures on file  ED Provider  Attending physically available and evaluated Pooja Srivastava  I managed the patient along with the ED Attending      Electronically Signed by         Sinai Burger,   06/11/18 0193 [facility], DO  06/11/18 8049

## 2018-06-11 NOTE — ED NOTES
Facility requesting patient to be evaluated by crisis  Crisis aware        Jeninfer August RN  06/11/18 3446

## 2018-06-14 ENCOUNTER — OFFICE VISIT (OUTPATIENT)
Dept: FAMILY MEDICINE CLINIC | Facility: CLINIC | Age: 33
End: 2018-06-14
Payer: COMMERCIAL

## 2018-06-14 VITALS
DIASTOLIC BLOOD PRESSURE: 70 MMHG | HEIGHT: 69 IN | TEMPERATURE: 99.2 F | SYSTOLIC BLOOD PRESSURE: 124 MMHG | HEART RATE: 80 BPM | BODY MASS INDEX: 35.55 KG/M2 | RESPIRATION RATE: 18 BRPM | WEIGHT: 240 LBS

## 2018-06-14 DIAGNOSIS — S09.90XD TRAUMATIC INJURY OF HEAD, SUBSEQUENT ENCOUNTER: Primary | ICD-10-CM

## 2018-06-14 PROCEDURE — 99213 OFFICE O/P EST LOW 20 MIN: CPT | Performed by: FAMILY MEDICINE

## 2018-06-14 PROCEDURE — 3008F BODY MASS INDEX DOCD: CPT | Performed by: FAMILY MEDICINE
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
## 2018-06-26 ENCOUNTER — OFFICE VISIT (OUTPATIENT)
Dept: FAMILY MEDICINE CLINIC | Facility: CLINIC | Age: 33
End: 2018-06-26
Payer: COMMERCIAL

## 2018-06-26 VITALS
SYSTOLIC BLOOD PRESSURE: 122 MMHG | BODY MASS INDEX: 34.1 KG/M2 | WEIGHT: 238.2 LBS | RESPIRATION RATE: 18 BRPM | HEIGHT: 70 IN | TEMPERATURE: 98.8 F | DIASTOLIC BLOOD PRESSURE: 70 MMHG | HEART RATE: 80 BPM

## 2018-06-26 DIAGNOSIS — F63.81 INTERMITTENT EXPLOSIVE DISORDER: ICD-10-CM

## 2018-06-26 DIAGNOSIS — F70 MILD INTELLECTUAL DISABILITY: Primary | ICD-10-CM

## 2018-06-26 DIAGNOSIS — I10 BENIGN ESSENTIAL HYPERTENSION: ICD-10-CM

## 2018-06-26 PROCEDURE — 99395 PREV VISIT EST AGE 18-39: CPT | Performed by: FAMILY MEDICINE
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
## 2018-07-05 DIAGNOSIS — I10 ESSENTIAL HYPERTENSION: ICD-10-CM

## 2018-07-05 RX ORDER — MINOCYCLINE HYDROCHLORIDE 100 MG/1
CAPSULE ORAL
Qty: 31 CAPSULE | Refills: 4 | Status: CANCELLED | OUTPATIENT
Start: 2018-07-05

## 2018-07-05 RX ORDER — AMLODIPINE BESYLATE 5 MG/1
TABLET ORAL
Qty: 31 TABLET | Refills: 4 | Status: CANCELLED | OUTPATIENT
Start: 2018-07-05

## 2018-07-06 DIAGNOSIS — I10 ESSENTIAL HYPERTENSION: ICD-10-CM

## 2018-07-06 DIAGNOSIS — L70.0 ACNE VULGARIS: Primary | ICD-10-CM

## 2018-07-09 DIAGNOSIS — I10 ESSENTIAL HYPERTENSION: ICD-10-CM

## 2018-07-09 RX ORDER — MINOCYCLINE HYDROCHLORIDE 100 MG/1
100 CAPSULE ORAL DAILY
Qty: 31 CAPSULE | Refills: 2 | Status: SHIPPED | OUTPATIENT
Start: 2018-07-09 | End: 2018-08-08

## 2018-07-09 RX ORDER — AMLODIPINE BESYLATE 5 MG/1
5 TABLET ORAL DAILY
Qty: 30 TABLET | Refills: 2 | Status: SHIPPED | OUTPATIENT
Start: 2018-07-09 | End: 2018-09-25 | Stop reason: SDUPTHER

## 2018-07-11 ENCOUNTER — TELEPHONE (OUTPATIENT)
Dept: FAMILY MEDICINE CLINIC | Facility: CLINIC | Age: 33
End: 2018-07-11

## 2018-07-11 DIAGNOSIS — H57.9 EYE PROBLEM: Primary | ICD-10-CM

## 2018-07-11 RX ORDER — SULFACETAMIDE SODIUM 100 MG/ML
1 SOLUTION/ DROPS OPHTHALMIC
Qty: 15 ML | Refills: 0 | Status: SHIPPED | OUTPATIENT
Start: 2018-07-11 | End: 2018-07-12 | Stop reason: CLARIF

## 2018-07-11 NOTE — TELEPHONE ENCOUNTER
Patient's insurance will not cover RetinA  They do cover sulfacetamide lotion 10%   Are you able to change, patient previously followed by Dr Ted Henderson

## 2018-07-12 DIAGNOSIS — L70.9 ACNE, UNSPECIFIED ACNE TYPE: Primary | ICD-10-CM

## 2018-07-12 RX ORDER — SULFACETAMIDE SODIUM 100 MG/ML
LOTION TOPICAL 2 TIMES DAILY
Qty: 1 BOTTLE | Refills: 2 | Status: CANCELLED
Start: 2018-07-12

## 2018-07-12 NOTE — TELEPHONE ENCOUNTER
Please correct the prescription, you ordered eye drops,  However the prescription should be for the lotion for his acne

## 2018-07-26 DIAGNOSIS — K21.9 GASTROESOPHAGEAL REFLUX DISEASE, ESOPHAGITIS PRESENCE NOT SPECIFIED: ICD-10-CM

## 2018-07-27 RX ORDER — OMEPRAZOLE 20 MG/1
CAPSULE, DELAYED RELEASE ORAL
Qty: 31 CAPSULE | Refills: 0 | Status: SHIPPED | OUTPATIENT
Start: 2018-07-27 | End: 2018-09-04 | Stop reason: SDUPTHER

## 2018-08-29 RX ORDER — MINOCYCLINE HYDROCHLORIDE 100 MG/1
CAPSULE ORAL
Qty: 31 CAPSULE | Refills: 0 | OUTPATIENT
Start: 2018-08-29

## 2018-08-29 RX ORDER — AMLODIPINE BESYLATE 5 MG/1
TABLET ORAL
Qty: 31 TABLET | Refills: 0 | OUTPATIENT
Start: 2018-08-29

## 2018-08-30 NOTE — TELEPHONE ENCOUNTER
Refills already addressed by Dr Jerry Mckeon in July 2018, this is an old request  Patient was told to establish with new PCP, but I don't see an upcoming appt for her  Please have her schedule an appt so we can prescribe her future refills      Thank you,  Anand

## 2018-09-04 DIAGNOSIS — K21.9 GASTROESOPHAGEAL REFLUX DISEASE, ESOPHAGITIS PRESENCE NOT SPECIFIED: ICD-10-CM

## 2018-09-04 NOTE — TELEPHONE ENCOUNTER
If a medication on this pt needs to be discontinued it is helpful if we can order for a month with a note to schedule pt for follow up to discuss new treatment plan  This is a group home pt    Thank you

## 2018-09-05 RX ORDER — OMEPRAZOLE 20 MG/1
20 CAPSULE, DELAYED RELEASE ORAL DAILY
Qty: 31 CAPSULE | Refills: 0 | Status: SHIPPED | OUTPATIENT
Start: 2018-09-05 | End: 2018-10-25 | Stop reason: SDUPTHER

## 2018-09-25 DIAGNOSIS — F31.9 BIPOLAR 1 DISORDER (HCC): ICD-10-CM

## 2018-09-25 DIAGNOSIS — K59.00 CONSTIPATION: ICD-10-CM

## 2018-09-25 DIAGNOSIS — I10 ESSENTIAL HYPERTENSION: ICD-10-CM

## 2018-09-25 DIAGNOSIS — K21.9 GERD (GASTROESOPHAGEAL REFLUX DISEASE): ICD-10-CM

## 2018-09-25 DIAGNOSIS — F84.0 AUTISTIC DISORDER, ACTIVE: ICD-10-CM

## 2018-09-25 DIAGNOSIS — L70.0 CYSTIC ACNE: ICD-10-CM

## 2018-09-25 DIAGNOSIS — L30.9 DERMATITIS: ICD-10-CM

## 2018-09-25 DIAGNOSIS — L21.0 SEBORRHEA CAPITIS: ICD-10-CM

## 2018-09-25 DIAGNOSIS — G47.00 INSOMNIA: ICD-10-CM

## 2018-09-25 RX ORDER — GABAPENTIN 800 MG/1
TABLET ORAL
Refills: 0
Start: 2018-09-25

## 2018-09-25 RX ORDER — AMMONIUM LACTATE 12 G/100G
LOTION TOPICAL
Qty: 400 G | Refills: 0
Start: 2018-09-25 | End: 2019-01-14 | Stop reason: SDUPTHER

## 2018-09-25 RX ORDER — AMLODIPINE BESYLATE 5 MG/1
TABLET ORAL
Qty: 30 TABLET | Refills: 0
Start: 2018-09-25 | End: 2018-10-04 | Stop reason: SDUPTHER

## 2018-09-25 RX ORDER — DIVALPROEX SODIUM 500 MG/1
TABLET, EXTENDED RELEASE ORAL
Refills: 0
Start: 2018-09-25 | End: 2019-12-13 | Stop reason: SDUPTHER

## 2018-09-25 RX ORDER — OLANZAPINE 20 MG/1
TABLET ORAL
Refills: 0
Start: 2018-09-25 | End: 2020-10-07

## 2018-09-25 RX ORDER — DOCUSATE SODIUM 100 MG/1
CAPSULE, LIQUID FILLED ORAL
Qty: 10 CAPSULE | Refills: 0
Start: 2018-09-25 | End: 2019-12-13 | Stop reason: SDUPTHER

## 2018-09-25 RX ORDER — MINOCYCLINE HYDROCHLORIDE 100 MG/1
100 TABLET ORAL DAILY
Qty: 30 TABLET | Refills: 0
Start: 2018-09-25 | End: 2018-09-26

## 2018-09-25 RX ORDER — LITHIUM CARBONATE 450 MG
TABLET, EXTENDED RELEASE ORAL
Refills: 0
Start: 2018-09-25

## 2018-09-25 RX ORDER — LEVOMEFOLATE CALCIUM 15 MG
15 TABLET ORAL DAILY
Refills: 0
Start: 2018-09-25 | End: 2020-04-16

## 2018-09-30 PROBLEM — I10 HYPERTENSION: Status: RESOLVED | Noted: 2018-04-24 | Resolved: 2018-09-30

## 2018-10-01 ENCOUNTER — OFFICE VISIT (OUTPATIENT)
Dept: FAMILY MEDICINE CLINIC | Facility: CLINIC | Age: 33
End: 2018-10-01
Payer: COMMERCIAL

## 2018-10-01 VITALS
WEIGHT: 238.6 LBS | SYSTOLIC BLOOD PRESSURE: 124 MMHG | DIASTOLIC BLOOD PRESSURE: 80 MMHG | HEART RATE: 74 BPM | BODY MASS INDEX: 35.34 KG/M2 | HEIGHT: 69 IN | RESPIRATION RATE: 14 BRPM | TEMPERATURE: 99.8 F

## 2018-10-01 DIAGNOSIS — K21.9 GASTROESOPHAGEAL REFLUX DISEASE WITHOUT ESOPHAGITIS: ICD-10-CM

## 2018-10-01 DIAGNOSIS — F20.9 SCHIZOPHRENIA, UNSPECIFIED TYPE (HCC): ICD-10-CM

## 2018-10-01 DIAGNOSIS — I10 BENIGN ESSENTIAL HYPERTENSION: Primary | ICD-10-CM

## 2018-10-01 DIAGNOSIS — F31.9 BIPOLAR 1 DISORDER (HCC): ICD-10-CM

## 2018-10-01 DIAGNOSIS — Z23 NEED FOR IMMUNIZATION AGAINST INFLUENZA: ICD-10-CM

## 2018-10-01 DIAGNOSIS — F41.9 ANXIETY: ICD-10-CM

## 2018-10-01 DIAGNOSIS — R26.9 ABNORMAL GAIT: ICD-10-CM

## 2018-10-01 PROCEDURE — 90686 IIV4 VACC NO PRSV 0.5 ML IM: CPT | Performed by: FAMILY MEDICINE

## 2018-10-01 PROCEDURE — 99213 OFFICE O/P EST LOW 20 MIN: CPT | Performed by: FAMILY MEDICINE

## 2018-10-01 PROCEDURE — 3074F SYST BP LT 130 MM HG: CPT | Performed by: FAMILY MEDICINE

## 2018-10-01 PROCEDURE — 90471 IMMUNIZATION ADMIN: CPT | Performed by: FAMILY MEDICINE

## 2018-10-01 PROCEDURE — 3079F DIAST BP 80-89 MM HG: CPT | Performed by: FAMILY MEDICINE

## 2018-10-01 NOTE — ASSESSMENT & PLAN NOTE
Well controlled  Denies dysphagia, odynophagia  Omeprazole  20 mg dealyed release PO daily at 7am   Continue current regimen

## 2018-10-01 NOTE — ASSESSMENT & PLAN NOTE
Well controlled on Zyprexa 20 mg daily, Taking 0 5 tabs BID  Denies any hallucinations, or confusion beyond baseline  Continue current regimen  Management per Dr Criss Medina

## 2018-10-01 NOTE — PROGRESS NOTES
Assessment/Plan: Teresa Salinas is a 29 y o  male with:   Problem List Items Addressed This Visit        Digestive    GERD (gastroesophageal reflux disease)     Well controlled  Denies dysphagia, odynophagia  Omeprazole  20 mg dealyed release PO daily at 7am   Continue current regimen  Cardiovascular and Mediastinum    Benign essential hypertension - Primary     BP Readings from Last 3 Encounters:   10/01/18 124/80   06/26/18 122/70   06/14/18 124/70   Well controlled on amlodipine 5 mg daily, once 8am   Continue current regimen  Patient's caregiver counseled on continued healthy diet  Blood work including BMP next due in 6 months  Will repeat at that time  RTC in 6 months or PRN            Other    Bipolar 1 disorder ([facility] [address] )     Well controlled on Depakote 1500 mg PO daily x1 day at 5pm  Reports stable mood  Denies SI/HI  Continue current regimen  Management by Dr Mina Stanley  Anxiety     Well controlled  Ativan 0 5 mg PO daily  Continue current regimen  Management per Dr Mina Stanley  Abnormal gait     Patient displays some baseline abnormal posture, leaning toward the right , and asymmetric gait, but not due to pain or any established neurological disorder  Per caregiver, it's more of a habit to sooth him and keep him calm, due to multiple psych comorbidities  No recent falls or injuries  Will continue to monitor  Schizophrenia ([facility] [address] )     Well controlled on Zyprexa 20 mg daily, Taking 0 5 tabs BID  Denies any hallucinations, or confusion beyond baseline  Continue current regimen  Management per Dr Mina Stanley              -Flu shot ordered today  Chief Complaint:  Chief Complaint   Patient presents with    Follow-up     HPI:   Teresa Salinas is a 29 y o  male with PMH intellectual disability presents with follow up of chronic conditions  [facility]) reports compliance with all medications  Denies any adverse affects      He follows with [facility], Dr Cordelia Lundborg  for Bipolar disorder, Schizophrenia, Anxiety who is managing his psychotropics including Ativan  Previously "headbanging behavior" due to intermittent explosive disorder improved due to better behavior and home environment modifications to minimize injuries  No recent such behaviors  Acute concerns include need for flu shot  Otherwise he's reportedly "been sharp"  Denies headaches, dizziness, lightheadedness, changes in vision, chest pain, sob  Continues to c/o pain in tooth due to recent dental extraction  Follow up scheduled with dentist          Current Outpatient Prescriptions   Medication Sig Dispense Refill    acetaminophen (TYLENOL) 325 mg tablet Take 650 mg by mouth every 6 (six) hours as needed for mild pain   amLODIPine (NORVASC) 5 mg tablet Take 1 tablet (5 mg total) by mouth once daily at 8 am 30 tablet 0    ammonium lactate (LAC-HYDRIN) 12 % lotion Apply topically to affected area on skin twice daily as needed for eczema 400 g 0    BENZOYL PEROXIDE 10 % external wash USE TO WASH FACE/ SHOULDERS/BACK DAILY AS NEEDED (ACNE)*STOP USING IFEXCESSIVE IRRIT/ g 2    divalproex sodium (DEPAKOTE ER) 500 mg 24 hr tablet Take 3 tablets (1500 mg total) by mouth once daily at 5 pm  0    docusate sodium (COLACE) 100 mg capsule Take 1 capsule (100 mg total) by mouth 2 times daily at 8 am and 5 pm for constipation  Hold for loose stools   10 capsule 0    gabapentin (NEURONTIN) 800 mg tablet Take 1 tablet (800 mg total) by mouth 3 times daily at 8 am, 4 pm, and 8 pm  0    L-Methylfolate 15 MG TABS Take 1 tablet (15 mg total) by mouth daily at 8 am  0    lanolin-mineral oil (BABY OIL) OIL Apply topically as needed for dry skin      lithium carbonate (LITHOBID) 450 mg CR tablet Take 2 tablets (900 mg total) by mouth daily at 5:30 pm  0    LORazepam (ATIVAN) 0 5 mg tablet Take 0 5 mg by mouth      Menthol-Ascorbic Acid (LUDENS COUGH DROPS) 3-60 MG LOZG Apply 3-60 mg to the mouth or throat 4 (four) times a day as needed (cough) 30 each 0    OLANZapine (ZyPREXA) 20 MG tablet Take 0 5 tablet (10 mg) by mouth twice daily at 12 pm and 8 pm  0    omeprazole (PriLOSEC) 20 mg delayed release capsule Take 1 capsule (20 mg total) by mouth daily 31 capsule 0    polyethylene glycol (MIRALAX) 17 g packet Take 17 g by mouth daily      pyrithione zinc (HEAD AND SHOULDERS) 1 % shampoo Apply topically daily as needed for dandruff      Sunscreens (SUNBLOCK SPF30) LOTN Apply every 2 hours up to five times daily 1 Bottle 5     No current facility-administered medications for this visit  Social History   Substance Use Topics    Smoking status: Never Smoker    Smokeless tobacco: Never Used    Alcohol use No     Patient Active Problem List   Diagnosis    Bipolar 1 disorder ([facility] [address] [facility]    Constipation    Poor dentition    Anxiety    Benign essential hypertension    Cystic acne    Depression    GERD (gastroesophageal reflux disease)    Head-banging    Intermittent explosive disorder    Seborrhea capitis    Abnormal gait    [facility]     ROS:  Review of Systems   Constitutional: Negative for chills and fever  HENT: Positive for dental problem  Negative for ear pain, hearing loss, rhinorrhea, sinus pain, sinus pressure, sore throat and trouble swallowing  Eyes: Negative for visual disturbance  Respiratory: Negative for cough and shortness of breath  Cardiovascular: Negative for chest pain and palpitations  Gastrointestinal: Negative for abdominal pain, diarrhea, nausea and vomiting  Musculoskeletal: Negative for arthralgias and gait problem  Baseline abnormal posture and asymmetric gait, leaning toward the right  Skin: Negative for rash  Neurological: Negative for headaches  Hematological: Does not bruise/bleed easily       Physical Exam:  /80 (BP Location: Left arm, Patient Position: Sitting, Cuff Size: Large)   Pulse 74 Temp 99 8 °F (37 7 °C) (Tympanic)   Resp 14   Ht 5' 9 4" (1 763 m)   Wt 108 kg (238 lb 9 6 oz)   BMI 34 83 kg/m²    Wt Readings from Last 3 Encounters:   10/01/18 108 kg (238 lb 9 6 oz)   06/26/18 108 kg (238 lb 3 2 oz)   06/14/18 109 kg (240 lb)     Physical Exam   Constitutional: He appears well-developed and well-nourished  No distress  Comfortable, Pleasant  Answer basics questions regarding symptoms appropriately  Multiple well healed scars including on left forearm  Due to previous "headbanging behavior"  No acute injuries noted  HENT:   Head: Normocephalic and atraumatic  Right Ear: External ear normal    Left Ear: External ear normal    Nose: Nose normal    Mouth/Throat: Oropharynx is clear and moist  No oropharyngeal exudate  Multiple teeth removed due to dental decay  No signs of active infection  Slightly erythema, no exudate, ulceration noted  Eyes: Pupils are equal, round, and reactive to light  Conjunctivae and EOM are normal  Right eye exhibits no discharge  Left eye exhibits no discharge  No scleral icterus  Neck: Normal range of motion  Neck supple  Cardiovascular: Normal rate, regular rhythm and normal heart sounds  No murmur heard  Pulmonary/Chest: Effort normal and breath sounds normal  No respiratory distress  He has no wheezes  Abdominal: Soft  Normal appearance and bowel sounds are normal  He exhibits no distension  There is no tenderness  Musculoskeletal: He exhibits no edema or tenderness  Lymphadenopathy:     He has no cervical adenopathy  Neurological: He is alert  Skin: Skin is warm and dry  He is not diaphoretic  Psychiatric:   Blunted affect  Baseline intellectual disability  Vitals reviewed  No future appointments      Austin Patten MD

## 2018-10-01 NOTE — ASSESSMENT & PLAN NOTE
Patient displays some baseline abnormal posture, leaning toward the right , and asymmetric gait, but not due to pain or any established neurological disorder  Per caregiver, it's more of a habit to sooth him and keep him calm, due to multiple psych comorbidities  No recent falls or injuries  Will continue to monitor

## 2018-10-01 NOTE — ASSESSMENT & PLAN NOTE
BP Readings from Last 3 Encounters:   10/01/18 124/80   06/26/18 122/70   06/14/18 124/70   Well controlled on amlodipine 5 mg daily, once 8am   Continue current regimen  Patient's caregiver counseled on continued healthy diet  Blood work including BMP next due in 6 months  Will repeat at that time    RTC in 6 months or PRN

## 2018-10-01 NOTE — ASSESSMENT & PLAN NOTE
Well controlled on Depakote 1500 mg PO daily x1 day at 5pm  Reports stable mood  Denies SI/HI  Continue current regimen  Management by Dr Dillon Larson

## 2018-10-04 DIAGNOSIS — I10 ESSENTIAL HYPERTENSION: ICD-10-CM

## 2018-10-04 RX ORDER — MINOCYCLINE HYDROCHLORIDE 100 MG/1
CAPSULE ORAL
Qty: 31 CAPSULE | Refills: 0 | Status: SHIPPED | OUTPATIENT
Start: 2018-10-04 | End: 2018-10-25 | Stop reason: SDUPTHER

## 2018-10-04 RX ORDER — AMLODIPINE BESYLATE 5 MG/1
TABLET ORAL
Qty: 31 TABLET | Refills: 0 | Status: SHIPPED | OUTPATIENT
Start: 2018-10-04 | End: 2018-11-05 | Stop reason: SDUPTHER

## 2018-10-09 ENCOUNTER — TELEPHONE (OUTPATIENT)
Dept: FAMILY MEDICINE CLINIC | Facility: CLINIC | Age: 33
End: 2018-10-09

## 2018-10-09 NOTE — TELEPHONE ENCOUNTER
Forms in your bin to determine correct pt diest and review of diagnosis and allergies for your signature    Thank you

## 2018-10-25 DIAGNOSIS — K21.9 GASTROESOPHAGEAL REFLUX DISEASE, ESOPHAGITIS PRESENCE NOT SPECIFIED: ICD-10-CM

## 2018-10-25 DIAGNOSIS — I10 ESSENTIAL HYPERTENSION: ICD-10-CM

## 2018-10-25 RX ORDER — MINOCYCLINE HYDROCHLORIDE 100 MG/1
CAPSULE ORAL
Qty: 30 CAPSULE | Refills: 0 | Status: SHIPPED | OUTPATIENT
Start: 2018-10-25 | End: 2018-11-24

## 2018-10-25 RX ORDER — OMEPRAZOLE 20 MG/1
20 CAPSULE, DELAYED RELEASE ORAL DAILY
Qty: 31 CAPSULE | Refills: 2 | Status: SHIPPED | OUTPATIENT
Start: 2018-10-25 | End: 2019-01-09 | Stop reason: SDUPTHER

## 2018-11-05 DIAGNOSIS — I10 ESSENTIAL HYPERTENSION: ICD-10-CM

## 2018-11-05 RX ORDER — AMLODIPINE BESYLATE 5 MG/1
TABLET ORAL
Qty: 30 TABLET | Refills: 5 | Status: SHIPPED | OUTPATIENT
Start: 2018-11-05 | End: 2019-05-06 | Stop reason: SDUPTHER

## 2018-11-26 ENCOUNTER — OFFICE VISIT (OUTPATIENT)
Dept: FAMILY MEDICINE CLINIC | Facility: CLINIC | Age: 33
End: 2018-11-26
Payer: COMMERCIAL

## 2018-11-26 VITALS
BODY MASS INDEX: 34.8 KG/M2 | SYSTOLIC BLOOD PRESSURE: 126 MMHG | TEMPERATURE: 98 F | WEIGHT: 235 LBS | HEART RATE: 76 BPM | DIASTOLIC BLOOD PRESSURE: 80 MMHG | RESPIRATION RATE: 18 BRPM | HEIGHT: 69 IN

## 2018-11-26 DIAGNOSIS — K08.9 POOR DENTITION: ICD-10-CM

## 2018-11-26 DIAGNOSIS — K02.9 DENTAL CARIES: Primary | ICD-10-CM

## 2018-11-26 PROCEDURE — 99213 OFFICE O/P EST LOW 20 MIN: CPT | Performed by: FAMILY MEDICINE

## 2018-11-26 PROCEDURE — 3008F BODY MASS INDEX DOCD: CPT | Performed by: FAMILY MEDICINE

## 2018-11-26 PROCEDURE — 1036F TOBACCO NON-USER: CPT | Performed by: FAMILY MEDICINE

## 2018-11-26 RX ORDER — AMOXICILLIN 500 MG/1
CAPSULE ORAL
Qty: 10 CAPSULE | Refills: 0 | Status: SHIPPED | OUTPATIENT
Start: 2018-11-26 | End: 2018-11-26 | Stop reason: SDUPTHER

## 2018-11-26 RX ORDER — AMOXICILLIN 500 MG/1
CAPSULE ORAL
Qty: 10 CAPSULE | Refills: 0 | Status: SHIPPED | OUTPATIENT
Start: 2018-11-26 | End: 2018-11-28

## 2018-11-26 NOTE — PROGRESS NOTES
Assessment/Plan:   Diagnoses and all orders for this visit:    Dental caries  -      amoxicillin (AMOXIL) 500 mg capsule; Take 2 tabs by mouth for the first dose today then every Eight hours later take 1 tab for 3 days  Suspect infection given acute pain and poor condition of teeth  Recommend dental visit this week  Rx printed and eRx  Poor dentition  -     Ambulatory referral to Speech Therapy; Future  Agree with swallow eval given limited dentition  Referral printed out today  Subjective:    Patient ID: William Cline is a 29 y o  male  Chief Complaint   Patient presents with    Follow-up     evaluate swallowing       HPI  Here with group home caregivers  Has been having dental work and extractions done, last 1 month ago  Barely has teeth left, would like a swallow study to evaluate best diet for him    C/o mouth pain since yesterday, concerned for new dental infection  Tylenol not helping  The following portions of the patient's history were reviewed and updated as appropriate: allergies, current medications, past family history, past medical history, past social history, past surgical history and problem list     Review of Systems   Constitutional: Negative for activity change, chills, diaphoresis and fever  HENT: Positive for dental problem  Negative for drooling, ear pain, facial swelling and trouble swallowing  Objective:      /80   Pulse 76   Temp 98 °F (36 7 °C)   Resp 18   Ht 5' 9" (1 753 m)   Wt 107 kg (235 lb)   BMI 34 70 kg/m²          Physical Exam   Constitutional: He appears well-developed and well-nourished  HENT:   Head: Normocephalic and atraumatic  Mouth/Throat: Oropharynx is clear and moist    Poor dentition of remaining teeth, tender along right side of mouth with some erythema to the gum line, no abscess observed  Dental decay present  Eyes: Conjunctivae and EOM are normal    Neck: Normal range of motion  Neck supple     Lymphadenopathy: He has no cervical adenopathy

## 2018-12-04 ENCOUNTER — EVALUATION (OUTPATIENT)
Dept: SPEECH THERAPY | Facility: REHABILITATION | Age: 33
End: 2018-12-04
Payer: COMMERCIAL

## 2018-12-04 DIAGNOSIS — R13.11 DYSPHAGIA, ORAL PHASE: Primary | ICD-10-CM

## 2018-12-04 PROCEDURE — G8997 SWALLOW GOAL STATUS: HCPCS

## 2018-12-04 PROCEDURE — 92610 EVALUATE SWALLOWING FUNCTION: CPT

## 2018-12-04 PROCEDURE — G8998 SWALLOW D/C STATUS: HCPCS

## 2018-12-04 PROCEDURE — G8996 SWALLOW CURRENT STATUS: HCPCS

## 2018-12-04 NOTE — PROGRESS NOTES
Speech Language Pathology Evaluation/Discharge  Today's date: 2018  Patient name: Alfred Napoles    : 1985        Referring provider: Wan Crenshaw MD  Dx:   Encounter Diagnosis     ICD-10-CM    1  Dysphagia, oral phase R13 11        Start Time: 1005  Stop Time: 1055  Total time in clinic (min): 50 minutes    Subjective Comments: Alfred Napoles is a 29 y o  male with mild intellectual disability who presents for an initial swallow evaluation today  He was accompanied by two caregivers from group home where pt resides  Caregivers report patient is loosing teeth and their main concern is that at some point it will be hard to chew with so few teeth  Pt with c/o tooth/gum pain today due to recent dental extraction  A second tooth extraction is scheduled for   Pt has recently lost weight however this is for health reasons  No recent aspiration pneumonias have been reported  Caregivers report that patient's current diet consists of primarily soft foods (including but not limited to mashed potatoes, oatmeal, hard boiled eggs, veggie burgers, crockpot meats, ground beef, etc )  Due to patient's lack of dentition and pain sensitivity, caregivers report it is rare he eats regular hard solid food  Caregivers also report it is rare the patient eats out for meals  Meals are consumed in the group home  Pt eats alone in room in recliner sitting upright with 2:1 caregiver supervision  Caregivers report no episodes of coughing or chocking have occurred during meals to this date      Safety Measures: poor insight    Reason for Referral:Difficulty swallowing solids or liquids  Prior Functional Status:Requires caregiver assistance for daily function  Medical History significant for:   Past Medical History:   Diagnosis Date    ADHD (attention deficit hyperactivity disorder)     Atypical pervasive developmental disorder     Autism     [facility] disorder ([facility] [address] [facility]     Constipation     Depression     Head-banging     Last Assessed:  9/1/17    Hyperlipidemia     Hypertension     Intermittent explosive disorder     Oppositional defiant disorder     Personality disorder ([facility] [address] )     Schizophrenia ([facility] )     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[facility] )     Seizures ([facility] )     Sleep disorder     Vitamin D insufficiency     Last Assessed:  6/22/17     Clinically Complex Situations:Mental/behavioral diagnosis affecting rate of recovery    Hearing:Within Normal limits  Vision:WNL with glasses  Medication List:   Current Outpatient Prescriptions   Medication Sig Dispense Refill    acetaminophen (TYLENOL) 325 mg tablet Take 650 mg by mouth every 6 (six) hours as needed for mild pain   amLODIPine (NORVASC) 5 mg tablet TAKE 1 TABLET BY MOUTH ONCE DAILY AT 8AM (HTN)*METZGAR 30 tablet 5    ammonium lactate (LAC-HYDRIN) 12 % lotion Apply topically to affected area on skin twice daily as needed for eczema 400 g 0    BENZOYL PEROXIDE 10 % external wash USE TO WASH FACE/ SHOULDERS/BACK DAILY AS NEEDED (ACNE)*STOP USING IFEXCESSIVE IRRIT/ g 2    divalproex sodium (DEPAKOTE ER) 500 mg 24 hr tablet Take 3 tablets (1500 mg total) by mouth once daily at 5 pm  0    docusate sodium (COLACE) 100 mg capsule Take 1 capsule (100 mg total) by mouth 2 times daily at 8 am and 5 pm for constipation  Hold for loose stools   10 capsule 0    gabapentin (NEURONTIN) 800 mg tablet Take 1 tablet (800 mg total) by mouth 3 times daily at 8 am, 4 pm, and 8 pm  0    L-Methylfolate 15 MG TABS Take 1 tablet (15 mg total) by mouth daily at 8 am  0    lanolin-mineral oil (BABY OIL) OIL Apply topically as needed for dry skin      lithium carbonate (LITHOBID) 450 mg CR tablet Take 2 tablets (900 mg total) by mouth daily at 5:30 pm  0    LORazepam (ATIVAN) 0 5 mg tablet Take 0 5 mg by mouth      Menthol-Ascorbic Acid (LUDENS COUGH DROPS) 3-60 MG LOZG Apply 3-60 mg to the mouth or throat 4 (four) times a day as needed (cough) 30 each 0    OLANZapine (ZyPREXA) 20 MG tablet Take 0 5 tablet (10 mg) by mouth twice daily at 12 pm and 8 pm  0    omeprazole (PriLOSEC) 20 mg delayed release capsule Take 1 capsule (20 mg total) by mouth daily 31 capsule 2    polyethylene glycol (MIRALAX) 17 g packet Take 17 g by mouth daily      pyrithione zinc (HEAD AND SHOULDERS) 1 % shampoo Apply topically daily as needed for dandruff      Sunscreens (SUNBLOCK SPF30) LOTN Apply every 2 hours up to five times daily 1 Bottle 5     No current facility-administered medications for this visit  Allergies: No Known Allergies  Primary Language: English  Preferred Language: English     Home Environment/ Lifestyle: Lives in group home with 2:1 supervision during day and 1:1 supervision at night  Current / Prior Services being received: no prior therapy services received    Mental Status: Alert  Behavior Status:Requires encouragement or motivation to cooperate  Communication Modalities: Verbal  Recent Speech/ Language therapy:None  Rehabilitation Prognosis:None    Assessments:                       Oral Motor Assessment        -Facial appearance Symmetrical   -Dentition Multiple teeth removed due to dental decay  No signs of active infection at this time  Caregivers report patient has been loosing teeth frequently   They are concerned that at some point it will be hard to chew with so few teeth     -Labial function Decreased coordination and Unable to assess secondary to poor direction following   -Lingual function Decreased coordination and Unable to assess secondary to poor direction following   -Velar function Symmetrical   Tremor/Involuntary Movement Noted in: n/a   Respiration: WNL  Is Drooling Present: Yes, baseline (reported by caregivers)    Assessments:  Swallowing Assessment   Reason for referral:Change in health status lack of dentition leading to concerns of oral dysphagia  Subjective Report of Swallowing: difficulty chewing food due to lack of dentition  No s/s of aspiration reported by caregivers prior to today's evaluation  Difficulty SwallowingSolids    Current DietSolids Regular and Mechanical Soft (with occasional regular solids)  Testing Variables: Pt with mild intellectual disability with a diagnosis of autism and ADHD  Pt had difficulty following directions during evaluation today, which is baseline reported by caregivers  Alternative Feeding Method: none  Laryngeal Excursion upon palpation Appeared [facility]    Liquid Consistency Testing: Thin Liquids  Administered By: Cup, Straw and Self-fed (water)   Clinical Observations:Anterior leakage    Signs/Symptoms of penetration/aspiration:None    Strategies/Response: Other: Small sips, bites, upright posture   Clinical Findings   Oral Phase Impairments Lip Seal and Premature spillage   Pharyngeal Phase Impairments N/A patient [facility]    Liquid Swallowing comments: Anterior leakage noted with cup swallows  Caregivers report this is baseline  Caregivers also report pt usually drinks water from water bottle which helps to decrease spilling  Due to patient's intellectual disability, pt presents with impulsive eating and drinking behaviors and requires cues to slow down and take small sips  With a straw, no anterior spillage was noted  However, pt prefers not to use straws and straws are not used in the group home where he resides      Solid Consistency Testing:  Puree  Administered By: Stevie Mouse and Self-fed (vanilla pudding)   Clinical Observations:Oral residue after swallow   Signs/Symptoms of penetration/aspiration:None   Strategies/Response: Multiple swallows Pt able to clear residue with multiple swallows   Clinical Findings   Oral Phase Impairments Stasis/ coating/ pocketing   Pharyngeal Phase Impairments None    Mechanical Soft Administered By: Self-fed (nutrigrain bar)   Clinical Observations:Prolonged/ inefficient mastication and Anterior leakage   Signs/Symptoms of penetration/aspiration:None   Strategies/Response: None   Clinical Findings   Oral Phase Impairments Premature spillage and Otherc/o pain with chewing due to tooth sensitivity   Pharyngeal Phase Impairments None    Regular Consistency Administered By: Self-fed (chewy bar)   Clinical Observations:Prolonged/ inefficient mastication and Other: pt did not fully chew bar due to tooth pain sensitivity   Signs/Symptoms of penetration/aspiration:Coughing and Globus sensation reported pt reported "it's stuck"   Strategies/Response: Multiple swallows pt instructed to swallow twice to clear and Other: liquid wash   Clinical Findings   Oral Phase Impairments Bolus formation/control and Piecemeal deglutition    Pharyngeal Phase Impairments S/S of penetration/ aspiration observed Throat clear x1, coughing x1    Mixed Consistency  Administered By: Spoon (fruit cup)   Clinical Observations: Independently cleared residue/pocketing   Signs/Symptoms of penetration/aspiration:None   Strategies/Response: None   Clinical Findings   Oral Phase Impairments Otherc/o pain while chewing due to tooth sensitivity   Pharyngeal Phase Impairments None    Solid Swallowing Comments: Pt with c/o tooth pain and sensitivity while consuming mechanical soft, mixed and regular solid consistencies today  Of note, pt underwent tooth extraction several days ago and area still sensitive  Caregivers report pt does not pocket food at home  He is reported to eat and drink impulsively  Pt with no s/s of aspiration on mechanical soft, puree and mixed consistencies today  However, pt demonstrated s/s of aspiration with regular consistency (chewy bar)  Pt did not chew bar fully and swallowed part of it whole resulting in throat clear x1 and coughing x1  He also reported "it's stuck" at this time  Caregivers report solids such as the chewy bar administered today are not part of patient's current diet due to tooth sensitivity   Residue and pocketing was noted with the puree consistency today however was able to be cleared with min clinician cues (this appeared to be due to impulsive eating: pt takes large bites and shovels food into mouth)    Swallowing Diagnostic Impression  Swallowing Diagnosis/Severity Oral Dysphagia mild   Factors affecting performance Mental Status, Following directions, Compliance and Other pt has an intellectual disability resulting in impulsivity and difficulty following directions  Safety concerns Other   At this time, the patient is at risk for coughing/choking if he is impulsive and does not eat regular textures slowly with small bites  Risk FactorsImpaired cognitive status/ dementia -Pt presents with mild intellectual disability    Safety Precautions  Supervision:1:1   Presentation:Needs verbal cues to use recommended strategies  StrategiesSmall sips and bites when eating and Slow rate, swallow between bites  PositioningUpright position during meals  Compensatory Strategies Multiple swallows  Recommend: Patient is already with 2:1 supervision at group home  Clinician has instructed caregivers that this level of supervision is to continue on mechanical soft diet rx today    Diet Texture Recommendations  Solids: Mechanical Soft  Liquids: Thin  Pills:Whole with liquids and Other: continue with taking pills whole with thin liquids as tolerated    ReferralsNone at this time  Impressions/ Recommendations  Impressions: Pt presents with mild oral dysphagia c/b premature spillage, pocketing and stasis and insufficient mastication of hard solids secondary to tooth sensitivity and pain due to lack of dentition  Rx mechanical soft solid diet with regular liquids at this time using strategies of small bites and sips while eating at a slow rate  Pt's caregivers have also been instructed to cue patient to swallow between bites and use a liquid wash if necessary to clear any of the bolus that has been pocketed   Caregivers have been instructed to monitor patient closely on the mechanical soft diet at this time and if pt demonstrates any s/s of aspiration, to call clinic for follow up        Recommendations:   Patient would benefit from: Other follow through with diet rx in group home   Frequency:No treatment warranted at this time   Duration:Other no treatment warranted at this time

## 2018-12-04 NOTE — LETTER
December 10, 2018    [place], [address] [facility] [address] [facility]    Patient: Shalini Marques   YOB: 1985   Date of Visit: 2018     Encounter Diagnosis     ICD-10-CM    1  Dysphagia, oral phase R13 11        Dear Dr Volodymyr Carrington:    Please review the attached Plan of Care from [facility] recent visit  Please verify that you agree therapy should continue by signing the attached document and sending it back to our office  If you have any questions or concerns, please don't hesitate to call  Sincerely,    Lawrence Ivy, CF-SLP      Referring Provider:     Based upon review of the patient's progress and continued therapy plan, it is my medical opinion that Jg Gibbs should continue speech therapy treatment at the Physical Therapy at [facility]:                    [place], [address] [address] [place] [address] [address]: [phone]                  Speech Language Pathology Evaluation/Discharge  Today's date: 2018  Patient name: Shalini Marques    : 1985        Referring provider: Abdias Gimenez MD  Dx:   Encounter Diagnosis     ICD-10-CM    1  Dysphagia, oral phase R13 11        Start Time: 1005  Stop Time: 1055  Total time in clinic (min): 50 minutes    Subjective Comments: Shalini Marques is a 29 y o  male with mild intellectual disability who presents for an initial swallow evaluation today  He was accompanied by two caregivers from group home where pt resides  Caregivers report patient is loosing teeth and their main concern is that at some point it will be hard to chew with so few teeth  Pt with c/o tooth/gum pain today due to recent dental extraction  A second tooth extraction is scheduled for   Pt has recently lost weight however this is for health reasons  No recent aspiration pneumonias have been reported   Caregivers report that patient's current diet consists of primarily soft foods (including but not limited to mashed potatoes, oatmeal, hard boiled eggs, veggie burgers, crockpot meats, ground beef, etc )  Due to patient's lack of dentition and pain sensitivity, caregivers report it is rare he eats regular hard solid food  Caregivers also report it is rare the patient eats out for meals  Meals are consumed in the group home  Pt eats alone in room in recliner sitting upright with 2:1 caregiver supervision  Caregivers report no episodes of coughing or chocking have occurred during meals to this date  Safety Measures: poor insight    Reason for Referral:Difficulty swallowing solids or liquids  Prior Functional Status:Requires caregiver assistance for daily function  Medical History significant for:   Past Medical History:   Diagnosis Date    ADHD (attention deficit hyperactivity disorder)     Atypical pervasive developmental disorder     Autism     [facility] disorder ([facility]     Constipation     Depression     Head-banging     Last Assessed:  9/1/17    Hyperlipidemia     Hypertension     Intermittent explosive disorder     Oppositional defiant disorder     Personality disorder ([facility]     Schizophrenia ([facility]     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[facility]     Seizures ([facility]     Sleep disorder     Vitamin D insufficiency     Last Assessed:  6/22/17     Clinically Complex Situations:Mental/behavioral diagnosis affecting rate of recovery    Hearing:Within Normal limits  Vision:WNL with glasses  Medication List:   Current Outpatient Prescriptions   Medication Sig Dispense Refill    acetaminophen (TYLENOL) 325 mg tablet Take 650 mg by mouth every 6 (six) hours as needed for mild pain        amLODIPine (NORVASC) 5 mg tablet TAKE 1 TABLET BY MOUTH ONCE DAILY AT 8AM (HTN)*METZGAR 30 tablet 5    ammonium lactate (LAC-HYDRIN) 12 % lotion Apply topically to affected area on skin twice daily as needed for eczema 400 g 0    BENZOYL PEROXIDE 10 % external wash USE TO WASH FACE/ SHOULDERS/BACK DAILY AS NEEDED (ACNE)*STOP USING IFEXCESSIVE IRRIT/ g 2    divalproex sodium (DEPAKOTE ER) 500 mg 24 hr tablet Take 3 tablets (1500 mg total) by mouth once daily at 5 pm  0    docusate sodium (COLACE) 100 mg capsule Take 1 capsule (100 mg total) by mouth 2 times daily at 8 am and 5 pm for constipation  Hold for loose stools  10 capsule 0    gabapentin (NEURONTIN) 800 mg tablet Take 1 tablet (800 mg total) by mouth 3 times daily at 8 am, 4 pm, and 8 pm  0    L-Methylfolate 15 MG TABS Take 1 tablet (15 mg total) by mouth daily at 8 am  0    lanolin-mineral oil (BABY OIL) OIL Apply topically as needed for dry skin      lithium carbonate (LITHOBID) 450 mg CR tablet Take 2 tablets (900 mg total) by mouth daily at 5:30 pm  0    LORazepam (ATIVAN) 0 5 mg tablet Take 0 5 mg by mouth      Menthol-Ascorbic Acid (LUDENS COUGH DROPS) 3-60 MG LOZG Apply 3-60 mg to the mouth or throat 4 (four) times a day as needed (cough) 30 each 0    OLANZapine (ZyPREXA) 20 MG tablet Take 0 5 tablet (10 mg) by mouth twice daily at 12 pm and 8 pm  0    omeprazole (PriLOSEC) 20 mg delayed release capsule Take 1 capsule (20 mg total) by mouth daily 31 capsule 2    polyethylene glycol (MIRALAX) 17 g packet Take 17 g by mouth daily      pyrithione zinc (HEAD AND SHOULDERS) 1 % shampoo Apply topically daily as needed for dandruff      Sunscreens (SUNBLOCK SPF30) LOTN Apply every 2 hours up to five times daily 1 Bottle 5     No current facility-administered medications for this visit  Allergies: No Known Allergies  Primary Language: English  Preferred Language: English     Home Environment/ Lifestyle: Lives in group home with 2:1 supervision during day and 1:1 supervision at night      Current / Prior Services being received: no prior therapy services received    Mental Status: Alert  Behavior Status:Requires encouragement or motivation to cooperate  Communication Modalities: Verbal  Recent Speech/ Language therapy:None  Rehabilitation Prognosis:None    Assessments:                       Oral Motor Assessment        -Facial appearance Symmetrical   -Dentition Multiple teeth removed due to dental decay  No signs of active infection at this time  Caregivers report patient has been loosing teeth frequently  They are concerned that at some point it will be hard to chew with so few teeth     -Labial function Decreased coordination and Unable to assess secondary to poor direction following   -Lingual function Decreased coordination and Unable to assess secondary to poor direction following   -Velar function Symmetrical   Tremor/Involuntary Movement Noted in: n/a   Respiration: WNL  Is Drooling Present: Yes, baseline (reported by caregivers)    Assessments:  Swallowing Assessment   Reason for referral:Change in health status lack of dentition leading to concerns of oral dysphagia  Subjective Report of Swallowing: difficulty chewing food due to lack of dentition  No s/s of aspiration reported by caregivers prior to today's evaluation  Difficulty SwallowingSolids    Current DietSolids Regular and Mechanical Soft (with occasional regular solids)  Testing Variables: Pt with mild intellectual disability with a diagnosis of autism and ADHD  Pt had difficulty following directions during evaluation today, which is baseline reported by caregivers  Alternative Feeding Method: none  Laryngeal Excursion upon palpation Appeared [facility]    Liquid Consistency Testing: Thin Liquids  Administered By: Cup, Straw and Self-fed (water)   Clinical Observations:Anterior leakage    Signs/Symptoms of penetration/aspiration:None    Strategies/Response: Other: Small sips, bites, upright posture   Clinical Findings   Oral Phase Impairments Lip Seal and Premature spillage   Pharyngeal Phase Impairments N/A patient [facility] [place]    Liquid Swallowing comments: Anterior leakage noted with cup swallows  Caregivers report this is baseline   Caregivers also report pt usually drinks water from water bottle which helps to decrease spilling  Due to patient's intellectual disability, pt presents with impulsive eating and drinking behaviors and requires cues to slow down and take small sips  With a straw, no anterior spillage was noted  However, pt prefers not to use straws and straws are not used in the group home where he resides  Solid Consistency Testing:  Puree  Administered By: Christina Lorenzo and Self-fed (vanilla pudding)   Clinical Observations:Oral residue after swallow   Signs/Symptoms of penetration/aspiration:None   Strategies/Response: Multiple swallows Pt able to clear residue with multiple swallows   Clinical Findings   Oral Phase Impairments Stasis/ coating/ pocketing   Pharyngeal Phase Impairments None    Mechanical Soft Administered By: Self-fed (nutrigrain bar)   Clinical Observations:Prolonged/ inefficient mastication and Anterior leakage   Signs/Symptoms of penetration/aspiration:None   Strategies/Response: None   Clinical Findings   Oral Phase Impairments Premature spillage and Otherc/o pain with chewing due to tooth sensitivity   Pharyngeal Phase Impairments None    Regular Consistency Administered By: Self-fed (chewy bar)   Clinical Observations:Prolonged/ inefficient mastication and Other: pt did not fully chew bar due to tooth pain sensitivity   Signs/Symptoms of penetration/aspiration:Coughing and Globus sensation reported pt reported "it's stuck"   Strategies/Response: Multiple swallows pt instructed to swallow twice to clear and Other: liquid wash   Clinical Findings   Oral Phase Impairments Bolus formation/control and Piecemeal deglutition    Pharyngeal Phase Impairments S/S of penetration/ aspiration observed Throat clear x1, coughing x1    Mixed Consistency  Administered By: Spoon (fruit cup)   Clinical Observations: Independently cleared residue/pocketing   Signs/Symptoms of penetration/aspiration:None   Strategies/Response: None   Clinical Findings   Oral Phase Impairments Otherc/o pain while chewing due to tooth sensitivity   Pharyngeal Phase Impairments None    Solid Swallowing Comments: Pt with c/o tooth pain and sensitivity while consuming mechanical soft, mixed and regular solid consistencies today  Of note, pt underwent tooth extraction several days ago and area still sensitive  Caregivers report pt does not pocket food at home  He is reported to eat and drink impulsively  Pt with no s/s of aspiration on mechanical soft, puree and mixed consistencies today  However, pt demonstrated s/s of aspiration with regular consistency (chewy bar)  Pt did not chew bar fully and swallowed part of it whole resulting in throat clear x1 and coughing x1  He also reported "it's stuck" at this time  Caregivers report solids such as the chewy bar administered today are not part of patient's current diet due to tooth sensitivity   Residue and pocketing was noted with the puree consistency today however was able to be cleared with min clinician cues (this appeared to be due to impulsive eating: pt takes large bites and shovels food into mouth)    Swallowing Diagnostic Impression  Swallowing Diagnosis/Severity Oral Dysphagia mild   Factors affecting performance Mental Status, Following directions, Compliance and Other pt has an intellectual disability resulting in impulsivity and difficulty following directions  Safety concerns Other   At this time, the patient is at risk for coughing/choking if he is impulsive and does not eat regular textures slowly with small bites  Risk FactorsImpaired cognitive status/ dementia -Pt presents with mild intellectual disability    Safety Precautions  Supervision:1:1   Presentation:Needs verbal cues to use recommended strategies  StrategiesSmall sips and bites when eating and Slow rate, swallow between bites  PositioningUpright position during meals  Compensatory Strategies Multiple swallows  Recommend: Patient is already with 2:1 supervision at group home  Clinician has instructed caregivers that this level of supervision is to continue on mechanical soft diet rx today    Diet Texture Recommendations  Solids: Mechanical Soft  Liquids: Thin  Pills:Whole with liquids and Other: continue with taking pills whole with thin liquids as tolerated    ReferralsNone at this time  Impressions/ Recommendations  Impressions: Pt presents with mild oral dysphagia c/b premature spillage, pocketing and stasis and insufficient mastication of hard solids secondary to tooth sensitivity and pain due to lack of dentition  Rx mechanical soft solid diet with regular liquids at this time using strategies of small bites and sips while eating at a slow rate  Pt's caregivers have also been instructed to cue patient to swallow between bites and use a liquid wash if necessary to clear any of the bolus that has been pocketed  Caregivers have been instructed to monitor patient closely on the mechanical soft diet at this time and if pt demonstrates any s/s of aspiration, to call clinic for follow up        Recommendations:   Patient would benefit from: Other follow through with diet rx in group home   Frequency:No treatment warranted at this time   Duration:Other no treatment warranted at this time

## 2018-12-10 ENCOUNTER — TRANSCRIBE ORDERS (OUTPATIENT)
Dept: PHYSICAL THERAPY | Facility: REHABILITATION | Age: 33
End: 2018-12-10

## 2018-12-10 DIAGNOSIS — R13.11 DYSPHAGIA, ORAL PHASE: Primary | ICD-10-CM

## 2018-12-10 DIAGNOSIS — L70.0 ACNE VULGARIS: Primary | ICD-10-CM

## 2018-12-11 RX ORDER — MINOCYCLINE HYDROCHLORIDE 100 MG/1
CAPSULE ORAL
Qty: 30 CAPSULE | Refills: 0 | Status: SHIPPED | OUTPATIENT
Start: 2018-12-11 | End: 2019-01-09 | Stop reason: SDUPTHER

## 2019-01-04 ENCOUNTER — OFFICE VISIT (OUTPATIENT)
Dept: FAMILY MEDICINE CLINIC | Facility: CLINIC | Age: 34
End: 2019-01-04

## 2019-01-04 VITALS
HEIGHT: 70 IN | HEART RATE: 88 BPM | SYSTOLIC BLOOD PRESSURE: 120 MMHG | RESPIRATION RATE: 16 BRPM | BODY MASS INDEX: 32.73 KG/M2 | TEMPERATURE: 98.2 F | WEIGHT: 228.6 LBS | DIASTOLIC BLOOD PRESSURE: 82 MMHG

## 2019-01-04 DIAGNOSIS — R13.10: ICD-10-CM

## 2019-01-04 DIAGNOSIS — K08.9 POOR DENTITION: Primary | ICD-10-CM

## 2019-01-04 PROCEDURE — 99213 OFFICE O/P EST LOW 20 MIN: CPT | Performed by: FAMILY MEDICINE

## 2019-01-04 NOTE — ASSESSMENT & PLAN NOTE
Improved  Patient continues to have baseline difficulty with mastication due to poor dentition mainly  Recent speech swallow done on 12/04/2018, after multiple dental extraction and crown placement  Records reviewed, recommendations are continued mechanical soft diet, breaking food into small bite size pieces  On exam crowns intact, oral exam otherwise benign  Continue current regimen  RTC in 3 months

## 2019-01-04 NOTE — PROGRESS NOTES
Assessment/Plan: Jose Schumacher is a 29 y o  male with:   Problem List Items Addressed This Visit        Digestive    Poor dentition - Primary    Swallowing/mastication problem     Improved  Patient continues to have baseline difficulty with mastication due to poor dentition mainly  Recent speech swallow done on 12/04/2018, after multiple dental extraction and crown placement  Records reviewed, recommendations are continued mechanical soft diet, breaking food into small bite size pieces  On exam crowns intact, oral exam otherwise benign  Continue current regimen  RTC in 3 months                 -Forms completed  -Need for blood work: per caregivers patient has had lab tests done multiple times in 2018, results not available in EPIC, still awaiting copies by group  Will consider ordering BMP, TSH, FLP at next visit if no copies are provided since patient hasn't had blood work done in nearly one year  Chief Complaint:  Chief Complaint   Patient presents with    Follow-up     HPI:   Jose Schumacher is a 29 y o  male PMH intellectual disability and dental caries s/p dental extraction and crown placement one month ago, here  With caregivers for forms to be completed re speech swallow study performed on 12/04/2018  Patient continues on mechanical soft diet and since dental extraction has had crowns placed  Patient compliant with low calorie diet, and has had intentional weight loss of about 10 pounds in last 3 months  Report compliance with all medications  Denies any adverse affects  Reports previously present dental pain has resolved after crown placement  Denies odynophagia, dysphagia  Reports compliance with recommendations as outlined by dietary recommendations per speech swallow eval   No acute complaints  At baseline state of health since last visit        History:  Current Outpatient Prescriptions   Medication Sig Dispense Refill    acetaminophen (TYLENOL) 325 mg tablet Take 650 mg by mouth every 6 (six) hours as needed for mild pain   amLODIPine (NORVASC) 5 mg tablet TAKE 1 TABLET BY MOUTH ONCE DAILY AT 8AM (HTN)*METZGAR 30 tablet 5    ammonium lactate (LAC-HYDRIN) 12 % lotion Apply topically to affected area on skin twice daily as needed for eczema 400 g 0    BENZOYL PEROXIDE 10 % external wash USE TO WASH FACE/ SHOULDERS/BACK DAILY AS NEEDED (ACNE)*STOP USING IFEXCESSIVE IRRIT/ g 2    divalproex sodium (DEPAKOTE ER) 500 mg 24 hr tablet Take 3 tablets (1500 mg total) by mouth once daily at 5 pm  0    docusate sodium (COLACE) 100 mg capsule Take 1 capsule (100 mg total) by mouth 2 times daily at 8 am and 5 pm for constipation  Hold for loose stools   10 capsule 0    gabapentin (NEURONTIN) 800 mg tablet Take 1 tablet (800 mg total) by mouth 3 times daily at 8 am, 4 pm, and 8 pm  0    L-Methylfolate 15 MG TABS Take 1 tablet (15 mg total) by mouth daily at 8 am  0    lanolin-mineral oil (BABY OIL) OIL Apply topically as needed for dry skin      lithium carbonate (LITHOBID) 450 mg CR tablet Take 2 tablets (900 mg total) by mouth daily at 5:30 pm  0    LORazepam (ATIVAN) 0 5 mg tablet Take 0 5 mg by mouth      Menthol-Ascorbic Acid (LUDENS COUGH DROPS) 3-60 MG LOZG Apply 3-60 mg to the mouth or throat 4 (four) times a day as needed (cough) 30 each 0    minocycline (MINOCIN) 100 mg capsule TAKE 1 CAPSULE BY MOUTH ONCE DAILY @ 8PM (INFECTION) METZGAR 30 capsule 0    OLANZapine (ZyPREXA) 20 MG tablet Take 0 5 tablet (10 mg) by mouth twice daily at 12 pm and 8 pm  0    omeprazole (PriLOSEC) 20 mg delayed release capsule Take 1 capsule (20 mg total) by mouth daily 31 capsule 2    polyethylene glycol (MIRALAX) 17 g packet Take 17 g by mouth daily      pyrithione zinc (HEAD AND SHOULDERS) 1 % shampoo Apply topically daily as needed for dandruff      Sunscreens (SUNBLOCK SPF30) LOTN Apply every 2 hours up to five times daily 1 Bottle 5     No current facility-administered medications for this visit  Past Medical History:   Diagnosis Date    ADHD (attention deficit hyperactivity disorder)     Atypical pervasive developmental disorder     Autism     Bipolar disorder (HCC)     Constipation     Depression     Head-banging     Last Assessed:  9/1/17    Hyperlipidemia     Hypertension     Intermittent explosive disorder     Oppositional defiant disorder     [facility] disorder ([facility]     Schizophrenia ([place] [address] )     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[facility] )     Seizures ([facility] )     Sleep disorder     Vitamin D insufficiency     Last Assessed:  6/22/17     Social History   Substance Use Topics    Smoking status: Never Smoker    Smokeless tobacco: Never Used    Alcohol use No     Patient Active Problem List   Diagnosis    Bipolar 1 disorder ([facility]    Constipation    Poor dentition    Anxiety    Benign essential hypertension    Cystic acne    Depression    GERD (gastroesophageal reflux disease)    Head-banging    Intermittent explosive disorder    Seborrhea capitis    Abnormal gait    Schizophrenia ([place] [address] )    Swallowing/mastication problem       ROS:  As Per HPI    Physical Exam:  /82   Pulse 88   Temp 98 2 °F (36 8 °C)   Resp 16   Ht 5' 9 9" (1 775 m)   Wt 104 kg (228 lb 9 6 oz)   BMI 32 89 kg/m²   Physical Exam   Constitutional: He appears well-developed  No distress  HENT:   Head: Normocephalic and atraumatic  Right Ear: External ear normal    Left Ear: External ear normal    Mouth/Throat: Oropharynx is clear and moist  No oropharyngeal exudate  Poor dentition s/p multiple teeth extraction and multiple crown placement  No erythema, lesions noted  Neck: No thyromegaly present  Cardiovascular: Normal rate, regular rhythm and normal heart sounds  No murmur heard  Pulmonary/Chest: Effort normal and breath sounds normal  No respiratory distress  He has no wheezes  Abdominal: Soft  Normal appearance and bowel sounds are normal  There is no tenderness  Lymphadenopathy:     He has no cervical adenopathy  Neurological: He is alert  Skin: Skin is warm and dry  He is not diaphoretic  Vitals reviewed        Future Appointments  Date Time Provider Department Center   4/12/2019 9:30 AM MD BERTA Bautista  [facility] Lisbeth Hamman, MD

## 2019-01-09 DIAGNOSIS — L70.0 ACNE VULGARIS: ICD-10-CM

## 2019-01-09 DIAGNOSIS — K21.9 GASTROESOPHAGEAL REFLUX DISEASE, ESOPHAGITIS PRESENCE NOT SPECIFIED: ICD-10-CM

## 2019-01-10 RX ORDER — OMEPRAZOLE 20 MG/1
20 CAPSULE, DELAYED RELEASE ORAL DAILY
Qty: 31 CAPSULE | Refills: 5 | Status: SHIPPED | OUTPATIENT
Start: 2019-01-10 | End: 2019-07-02 | Stop reason: SDUPTHER

## 2019-01-10 RX ORDER — MINOCYCLINE HYDROCHLORIDE 100 MG/1
100 CAPSULE ORAL DAILY
Qty: 30 CAPSULE | Refills: 5 | Status: SHIPPED | OUTPATIENT
Start: 2019-01-10 | End: 2019-02-09

## 2019-01-14 DIAGNOSIS — L30.9 DERMATITIS: ICD-10-CM

## 2019-01-14 RX ORDER — AMMONIUM LACTATE 12 G/100G
LOTION TOPICAL
Qty: 400 G | Refills: 1 | Status: SHIPPED | OUTPATIENT
Start: 2019-01-14 | End: 2019-12-10 | Stop reason: SDUPTHER

## 2019-01-17 DIAGNOSIS — L70.0 ACNE VULGARIS: Primary | ICD-10-CM

## 2019-01-17 RX ORDER — SULFACETAMIDE SODIUM 100 MG/ML
LOTION TOPICAL 2 TIMES DAILY
Qty: 118 ML | Refills: 5 | Status: SHIPPED | OUTPATIENT
Start: 2019-01-17 | End: 2020-07-06 | Stop reason: SDUPTHER

## 2019-02-01 ENCOUNTER — OFFICE VISIT (OUTPATIENT)
Dept: FAMILY MEDICINE CLINIC | Facility: CLINIC | Age: 34
End: 2019-02-01

## 2019-02-01 VITALS
RESPIRATION RATE: 16 BRPM | HEIGHT: 70 IN | WEIGHT: 223.4 LBS | SYSTOLIC BLOOD PRESSURE: 122 MMHG | BODY MASS INDEX: 31.98 KG/M2 | HEART RATE: 92 BPM | DIASTOLIC BLOOD PRESSURE: 80 MMHG | TEMPERATURE: 98.9 F

## 2019-02-01 DIAGNOSIS — R10.30 LOWER ABDOMINAL PAIN: Primary | ICD-10-CM

## 2019-02-01 DIAGNOSIS — J06.9 UPPER RESPIRATORY TRACT INFECTION, UNSPECIFIED TYPE: ICD-10-CM

## 2019-02-01 PROBLEM — R10.9 ABDOMINAL PAIN: Status: ACTIVE | Noted: 2019-02-01

## 2019-02-01 PROCEDURE — 99213 OFFICE O/P EST LOW 20 MIN: CPT | Performed by: FAMILY MEDICINE

## 2019-02-01 NOTE — ASSESSMENT & PLAN NOTE
Likely secondry to viral infection  Pt currently afebrile and tolerating oral intake  Pt has no contact with influenza positive individual and has had flu shot this year  Supportive care as needed  -home med prn  -return to office if pt develops difficulty breathing, no longer tolerating oral intake, having altered mental status or persistent fever

## 2019-02-01 NOTE — PROGRESS NOTES
Assessment/Plan:    Abdominal pain  Abdominal tenderness on exam  Pt has not had BM this morning and had not had any food  The lack of BM and hungry likely the source of pain  Informed care team to return if he develops diarrhea, nausea or vomiting or if his constipation persists  URI (upper respiratory infection)  Likely secondry to viral infection  Pt currently afebrile and tolerating oral intake  Pt has no contact with influenza positive individual and has had flu shot this year  Supportive care as needed  -home med prn  -return to office if pt develops difficulty breathing, no longer tolerating oral intake, having altered mental status or persistent fever  Subjective:      Patient ID: Brooke Bird is a 29 y o  male  HPI  Pt presents with 2-3 day history of URI symptoms  Patient is here with his care today, Teresa and Cathie Webb  Patient's symptoms include congestion, cough and sneezing  Patient did not have any significant rhinorrhea, fever  Temperature at home of 99 2°  Patient is still tolerating oral intake, making adequate urine  He has not had contact with individuals with positive influenza  Patient did receive influenza vaccine on October 1, 2018  Patient is using methanol as score back acid cough drops, Robitussin, Chloraseptic throat spray, Dimaphen prn at home  Care team states pt is adherent medication  Pt is still his baseline self  Pt has upcoming appointment with Dr Florence  No other concerns today  Review of Systems   Constitutional: Negative for activity change, appetite change, chills, fatigue and fever  HENT: Positive for sneezing and sore throat  Negative for rhinorrhea and trouble swallowing  Respiratory: Positive for cough  Negative for shortness of breath  Cardiovascular: Negative for chest pain  Gastrointestinal: Negative for abdominal pain, blood in stool, constipation, diarrhea, nausea and vomiting     Genitourinary: Negative for decreased urine volume and hematuria  Musculoskeletal: Negative for gait problem  Skin: Negative for rash  Allergic/Immunologic: Negative for environmental allergies and food allergies  Neurological: Negative for dizziness, light-headedness and headaches  Psychiatric/Behavioral: Negative for sleep disturbance  Objective:    /80   Pulse 92   Temp 98 9 °F (37 2 °C)   Resp 16   Ht 5' 9 9" (1 775 m)   Wt 101 kg (223 lb 6 4 oz)   BMI 32 15 kg/m²      Physical Exam   Constitutional: He appears well-developed and well-nourished  No distress  HENT:   Head: Normocephalic and atraumatic  Nose: Nose normal    Poor dentition   Eyes: Pupils are equal, round, and reactive to light  EOM are normal    Neck: Normal range of motion  Cardiovascular: Normal rate, regular rhythm, normal heart sounds and intact distal pulses  Exam reveals no gallop and no friction rub  No murmur heard  Pulmonary/Chest: Effort normal and breath sounds normal  No respiratory distress  He has no wheezes  He has no rales  Abdominal: Soft  Bowel sounds are normal  He exhibits no distension  There is tenderness  There is no rebound and no guarding  Mild abdominal pain  Pt has not had bowel movement this morning  Pt has not had anything to eat  Scar noted on abdominal region under the umbilicus   Musculoskeletal: He exhibits no edema, tenderness or deformity  Neurological: He is alert  Skin: Skin is warm and dry  No rash noted  He is not diaphoretic  No erythema  No pallor  Psychiatric: He has a normal mood and affect  Vitals reviewed          Current Outpatient Prescriptions:     acetaminophen (TYLENOL) 325 mg tablet, Take 650 mg by mouth every 6 (six) hours as needed for mild pain , Disp: , Rfl:     amLODIPine (NORVASC) 5 mg tablet, TAKE 1 TABLET BY MOUTH ONCE DAILY AT 8AM (HTN)*MARAL, Disp: 30 tablet, Rfl: 5    ammonium lactate (LAC-HYDRIN) 12 % lotion, Apply topically to affected area on skin twice daily as needed for eczema, Disp: 400 g, Rfl: 1    BENZOYL PEROXIDE 10 % external wash, USE TO WASH FACE/ SHOULDERS/BACK DAILY AS NEEDED (ACNE)*STOP USING IFEXCESSIVE IRRIT/RED, Disp: 237 g, Rfl: 2    divalproex sodium (DEPAKOTE ER) 500 mg 24 hr tablet, Take 3 tablets (1500 mg total) by mouth once daily at 5 pm, Disp: , Rfl: 0    docusate sodium (COLACE) 100 mg capsule, Take 1 capsule (100 mg total) by mouth 2 times daily at 8 am and 5 pm for constipation  Hold for loose stools  , Disp: 10 capsule, Rfl: 0    gabapentin (NEURONTIN) 800 mg tablet, Take 1 tablet (800 mg total) by mouth 3 times daily at 8 am, 4 pm, and 8 pm, Disp: , Rfl: 0    L-Methylfolate 15 MG TABS, Take 1 tablet (15 mg total) by mouth daily at 8 am, Disp: , Rfl: 0    lanolin-mineral oil (BABY OIL) OIL, Apply topically as needed for dry skin, Disp: , Rfl:     lithium carbonate (LITHOBID) 450 mg CR tablet, Take 2 tablets (900 mg total) by mouth daily at 5:30 pm, Disp: , Rfl: 0    LORazepam (ATIVAN) 0 5 mg tablet, Take 0 5 mg by mouth, Disp: , Rfl:     Menthol-Ascorbic Acid (LUDENS COUGH DROPS) 3-60 MG LOZG, Apply 3-60 mg to the mouth or throat 4 (four) times a day as needed (cough), Disp: 30 each, Rfl: 0    minocycline (MINOCIN) 100 mg capsule, Take 1 capsule (100 mg total) by mouth daily for 30 days Take one capsule by mouth daily @ 8 Pm, Disp: 30 capsule, Rfl: 5    OLANZapine (ZyPREXA) 20 MG tablet, Take 0 5 tablet (10 mg) by mouth twice daily at 12 pm and 8 pm, Disp: , Rfl: 0    omeprazole (PriLOSEC) 20 mg delayed release capsule, Take 1 capsule (20 mg total) by mouth daily, Disp: 31 capsule, Rfl: 5    polyethylene glycol (MIRALAX) 17 g packet, Take 17 g by mouth daily, Disp: , Rfl:     pyrithione zinc (HEAD AND SHOULDERS) 1 % shampoo, Apply topically daily as needed for dandruff, Disp: , Rfl:     Sulfacetamide Sodium, Acne, 10 % LOTN, Apply topically 2 (two) times a day, Disp: 118 mL, Rfl: 5    Sunscreens (SUNBLOCK SPF30) LOTN, Apply every 2 hours up to five times daily, Disp: 1 Bottle, Rfl: 5      NUNU Samano    Family Medicine, PGY-1

## 2019-02-01 NOTE — ASSESSMENT & PLAN NOTE
Abdominal tenderness on exam  Pt has not had BM this morning and had not had any food  The lack of BM and hungry likely the source of pain  Informed care team to return if he develops diarrhea, nausea or vomiting or if his constipation persists

## 2019-03-01 DIAGNOSIS — L21.0 DANDRUFF: Primary | ICD-10-CM

## 2019-03-06 PROCEDURE — PBDEN PB DENTAL DUMMY CHARGE: Performed by: DENTIST

## 2019-03-08 ENCOUNTER — TELEPHONE (OUTPATIENT)
Dept: FAMILY MEDICINE CLINIC | Facility: CLINIC | Age: 34
End: 2019-03-08

## 2019-04-12 ENCOUNTER — OFFICE VISIT (OUTPATIENT)
Dept: FAMILY MEDICINE CLINIC | Facility: CLINIC | Age: 34
End: 2019-04-12

## 2019-04-12 VITALS
SYSTOLIC BLOOD PRESSURE: 122 MMHG | RESPIRATION RATE: 16 BRPM | DIASTOLIC BLOOD PRESSURE: 80 MMHG | TEMPERATURE: 100.1 F | BODY MASS INDEX: 31.95 KG/M2 | HEART RATE: 80 BPM | HEIGHT: 70 IN | WEIGHT: 223.2 LBS
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
## 2019-04-12 DIAGNOSIS — I10 BENIGN ESSENTIAL HYPERTENSION: Primary | ICD-10-CM

## 2019-04-12 PROCEDURE — 99213 OFFICE O/P EST LOW 20 MIN: CPT | Performed by: FAMILY MEDICINE

## 2019-05-06 ENCOUNTER — TELEPHONE (OUTPATIENT)
Dept: FAMILY MEDICINE CLINIC | Facility: CLINIC | Age: 34
End: 2019-05-06

## 2019-05-06 DIAGNOSIS — I10 ESSENTIAL HYPERTENSION: ICD-10-CM

## 2019-05-06 RX ORDER — AMLODIPINE BESYLATE 5 MG/1
5 TABLET ORAL DAILY
Qty: 30 TABLET | Refills: 5 | Status: SHIPPED | OUTPATIENT
Start: 2019-05-06 | End: 2019-11-06 | Stop reason: SDUPTHER

## 2019-05-14 ENCOUNTER — TELEPHONE (OUTPATIENT)
Dept: FAMILY MEDICINE CLINIC | Facility: CLINIC | Age: 34
End: 2019-05-14

## 2019-05-17 ENCOUNTER — OFFICE VISIT (OUTPATIENT)
Dept: FAMILY MEDICINE CLINIC | Facility: CLINIC | Age: 34
End: 2019-05-17

## 2019-05-17 VITALS
WEIGHT: 218.2 LBS | BODY MASS INDEX: 31.24 KG/M2 | SYSTOLIC BLOOD PRESSURE: 124 MMHG | HEART RATE: 78 BPM | HEIGHT: 70 IN | TEMPERATURE: 98.6 F | RESPIRATION RATE: 16 BRPM | DIASTOLIC BLOOD PRESSURE: 80 MMHG

## 2019-05-17 DIAGNOSIS — I10 BENIGN ESSENTIAL HYPERTENSION: Primary | ICD-10-CM

## 2019-05-17 PROCEDURE — 3079F DIAST BP 80-89 MM HG: CPT | Performed by: FAMILY MEDICINE

## 2019-05-17 PROCEDURE — 3074F SYST BP LT 130 MM HG: CPT | Performed by: FAMILY MEDICINE

## 2019-05-17 PROCEDURE — 3725F SCREEN DEPRESSION PERFORMED: CPT | Performed by: FAMILY MEDICINE

## 2019-05-17 PROCEDURE — 99213 OFFICE O/P EST LOW 20 MIN: CPT | Performed by: FAMILY MEDICINE

## 2019-06-11 ENCOUNTER — TELEPHONE (OUTPATIENT)
Dept: FAMILY MEDICINE CLINIC | Facility: CLINIC | Age: 34
End: 2019-06-11

## 2019-06-11 NOTE — TELEPHONE ENCOUNTER
Dr Florence, [employer] sent a application for this patient to be filled out for a new Representative Payee  Form is in your triage folder   Thank you

## 2019-06-28 ENCOUNTER — OFFICE VISIT (OUTPATIENT)
Dept: FAMILY MEDICINE CLINIC | Facility: CLINIC | Age: 34
End: 2019-06-28

## 2019-06-28 VITALS
BODY MASS INDEX: 30.81 KG/M2 | SYSTOLIC BLOOD PRESSURE: 140 MMHG | TEMPERATURE: 99.3 F | DIASTOLIC BLOOD PRESSURE: 80 MMHG | WEIGHT: 215.2 LBS | RESPIRATION RATE: 16 BRPM | HEIGHT: 70 IN | HEART RATE: 78 BPM

## 2019-06-28 DIAGNOSIS — Z00.00 HEALTHCARE MAINTENANCE: Primary | ICD-10-CM

## 2019-06-28 DIAGNOSIS — I10 BENIGN ESSENTIAL HYPERTENSION: ICD-10-CM

## 2019-06-28 DIAGNOSIS — K21.9 GASTROESOPHAGEAL REFLUX DISEASE WITHOUT ESOPHAGITIS: ICD-10-CM

## 2019-06-28 PROBLEM — Z11.1 SCREENING-PULMONARY TB: Status: RESOLVED | Noted: 2019-06-28 | Resolved: 2019-06-28

## 2019-06-28 PROBLEM — Z11.1 SCREENING-PULMONARY TB: Status: ACTIVE | Noted: 2019-06-28

## 2019-06-28 PROCEDURE — 99395 PREV VISIT EST AGE 18-39: CPT | Performed by: FAMILY MEDICINE

## 2019-07-01 ENCOUNTER — TELEPHONE (OUTPATIENT)
Dept: FAMILY MEDICINE CLINIC | Facility: CLINIC | Age: 34
End: 2019-07-01

## 2019-07-01 NOTE — TELEPHONE ENCOUNTER
[employer] sent form to be reviewed and signed in regards to most recent office visit (6/28/19)  Form placed in triage folder      Thank you

## 2019-07-02 DIAGNOSIS — K21.9 GASTROESOPHAGEAL REFLUX DISEASE, ESOPHAGITIS PRESENCE NOT SPECIFIED: ICD-10-CM

## 2019-07-02 RX ORDER — OMEPRAZOLE 20 MG/1
20 CAPSULE, DELAYED RELEASE ORAL DAILY
Qty: 31 CAPSULE | Refills: 11 | Status: SHIPPED | OUTPATIENT
Start: 2019-07-02 | End: 2020-07-01

## 2019-07-17 DIAGNOSIS — L70.0 ACNE VULGARIS: ICD-10-CM

## 2019-07-30 ENCOUNTER — TELEPHONE (OUTPATIENT)
Dept: FAMILY MEDICINE CLINIC | Facility: CLINIC | Age: 34
End: 2019-07-30

## 2019-07-30 DIAGNOSIS — K08.9 POOR DENTITION: Primary | ICD-10-CM

## 2019-07-30 NOTE — TELEPHONE ENCOUNTER
Group home assistant requesting a prescription for LISTERINE as a PRN order  Please review   Thank you, Regi Kat

## 2019-08-09 ENCOUNTER — TRANSCRIBE ORDERS (OUTPATIENT)
Dept: ADMINISTRATIVE | Age: 34
End: 2019-08-09

## 2019-08-09 ENCOUNTER — APPOINTMENT (OUTPATIENT)
Dept: LAB | Age: 34
End: 2019-08-09
Payer: COMMERCIAL

## 2019-08-09 DIAGNOSIS — Z79.899 ENCOUNTER FOR LONG-TERM (CURRENT) USE OF OTHER MEDICATIONS: Primary | ICD-10-CM

## 2019-08-09 DIAGNOSIS — Z79.899 ENCOUNTER FOR LONG-TERM (CURRENT) USE OF OTHER MEDICATIONS: ICD-10-CM

## 2019-08-09 PROCEDURE — 80165 DIPROPYLACETIC ACID FREE: CPT

## 2019-08-09 PROCEDURE — 36415 COLL VENOUS BLD VENIPUNCTURE: CPT

## 2019-08-12 LAB — VALPROATE FREE SERPL-MCNC: 15.7 UG/ML (ref 6–22)

## 2019-09-11 ENCOUNTER — CONSULT (OUTPATIENT)
Dept: GASTROENTEROLOGY | Facility: MEDICAL CENTER | Age: 34
End: 2019-09-11
Payer: COMMERCIAL

## 2019-09-11 ENCOUNTER — TELEPHONE (OUTPATIENT)
Dept: FAMILY MEDICINE CLINIC | Facility: CLINIC | Age: 34
End: 2019-09-11

## 2019-09-11 VITALS
HEIGHT: 70 IN | BODY MASS INDEX: 30.49 KG/M2 | SYSTOLIC BLOOD PRESSURE: 118 MMHG | WEIGHT: 213 LBS | DIASTOLIC BLOOD PRESSURE: 82 MMHG | TEMPERATURE: 95 F | HEART RATE: 98 BPM

## 2019-09-11 DIAGNOSIS — R09.81 MILD NASAL CONGESTION: Primary | ICD-10-CM

## 2019-09-11 DIAGNOSIS — K21.9 GASTROESOPHAGEAL REFLUX DISEASE WITHOUT ESOPHAGITIS: ICD-10-CM

## 2019-09-11 DIAGNOSIS — R21 RASH: ICD-10-CM

## 2019-09-11 DIAGNOSIS — R10.13 EPIGASTRIC ABDOMINAL PAIN: Primary | ICD-10-CM

## 2019-09-11 DIAGNOSIS — F20.9 SCHIZOPHRENIA, UNSPECIFIED TYPE (HCC): ICD-10-CM

## 2019-09-11 PROCEDURE — 99243 OFF/OP CNSLTJ NEW/EST LOW 30: CPT | Performed by: INTERNAL MEDICINE

## 2019-09-11 RX ORDER — L-DESOXYEPHEDRINE 50 MG
INHALER (EA) NASAL 2 TIMES DAILY PRN
Qty: 50 G | Refills: 11 | Status: SHIPPED | OUTPATIENT
Start: 2019-09-11 | End: 2020-08-17 | Stop reason: SDUPTHER

## 2019-09-11 RX ORDER — IBUPROFEN 600 MG/1
600 TABLET ORAL EVERY 6 HOURS PRN
Qty: 30 TABLET | Refills: 11 | Status: SHIPPED | OUTPATIENT
Start: 2019-09-11 | End: 2020-08-17 | Stop reason: SDUPTHER

## 2019-09-11 RX ORDER — GUAIFENESIN/DEXTROMETHORPHAN 100-10MG/5
5 SYRUP ORAL 3 TIMES DAILY PRN
Qty: 118 ML | Refills: 11 | Status: SHIPPED | OUTPATIENT
Start: 2019-09-11 | End: 2020-08-17 | Stop reason: SDUPTHER

## 2019-09-11 NOTE — TELEPHONE ENCOUNTER
Requesting refill on patient's PRN medications    Baby Oil- lanolin-mineral oil  Vicks- vapor rub  Sore throat spray  Ibuprofen  Robitussin    Please review   Thank you, Gama Hutchison

## 2019-09-16 PROBLEM — R10.13 EPIGASTRIC ABDOMINAL PAIN: Status: ACTIVE | Noted: 2019-09-16

## 2019-09-16 NOTE — PROGRESS NOTES
[facility] - Outpatient Consultation  Marli Mariano 36 y o  male MRN: [id number]  Encounter: [id number]          ASSESSMENT AND PLAN:      1  Gastroesophageal reflux disease without esophagitis  2  Epigastric abdominal pain  3  Schizophrenia  4  Constipation    - US abdomen complete; Future  - Celiac Disease Antibody Profile; Future  - H  pylori antigen, stool; Future  - Lipase; Future  - CBC and differential; Future  - Comprehensive metabolic panel; Future  - TSH, 3rd generation with Free T4 reflex; Future    History is not reliable due to patient's history psychiatric disorder  He has severe schizophrenia and unable to give consistent history due to this  He has poor insight into his active medical issues  He presents with his caretaker from the facility  He has complained of epigastric discomfort but there has been no change in his daily activity  He has only complained when directly asked about abdominal discomfort otherwise has not shown any visual discomfort  He has been able to tolerate oral intake without any issues  There has been was no evidence of nausea/vomiting/melena  He has been on omeprazole for several years  He also has history of constipation well controlled with stool softener such as MiraLax  He also has history of acid reflux disease with no prior history of endoscopic evaluation  He is overall stable from this as well  No weight loss or complaints regarding dysphagia   ______________________________________________________________________    HPI:      He is a 62-year-old male presents here for evaluation for epigastric discomfort, history of acid reflux disease, history of constipation in setting of schizophrenic  He is a poor historian due to his history of psychiatric disorder and has poor insight into his medical issues  He has had no visible discomfort or epigastric pain has not interfere with his daily activity    Has been able to tolerate p o  Intake of food without any issues  He has been taking omeprazole and MiraLax without any acute distress  No known family history of colorectal cancer  REVIEW OF SYSTEMS:    CONSTITUTIONAL: Denies any fever, chills, rigors, and weight loss  HEENT: No earache or tinnitus  Denies hearing loss or visual disturbances  CARDIOVASCULAR: No chest pain or palpitations  RESPIRATORY: Denies any cough, hemoptysis, shortness of breath or dyspnea on exertion  GASTROINTESTINAL: As noted in the History of Present Illness  GENITOURINARY: No problems with urination  Denies any hematuria or dysuria  NEUROLOGIC: No dizziness or vertigo, denies headaches  MUSCULOSKELETAL: Denies any muscle or joint pain  SKIN: Denies skin rashes or itching  ENDOCRINE: Denies excessive thirst  Denies intolerance to heat or cold  PSYCHOSOCIAL: Denies depression or anxiety  Denies any recent memory loss  Historical Information   Past Medical History:   Diagnosis Date    ADHD (attention deficit hyperactivity disorder)     Atypical pervasive developmental disorder     Autism     [facility]ipolar disorder [facility]     Constipation     Depression     Head-banging     Last Assessed:  9/1/17    Hyperlipidemia     Hypertension     Intermittent explosive disorder     Oppositional defiant disorder     [facility]     Schizophrenia [facility]     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[facility]     Seizures ([facility]     Sleep disorder     Vitamin D insufficiency     Last Assessed:  6/22/17     Past Surgical History:   Procedure Laterality Date    NO PAST SURGERIES      UMBILICAL HERNIA REPAIR       Social History   Social History     Substance and Sexual Activity   Alcohol Use No     Social History     Substance and Sexual Activity   Drug Use No     Social History     Tobacco Use   Smoking Status Never Smoker   Smokeless Tobacco Never Used     No family history on file      Meds/Allergies       Current Outpatient Medications:    acetaminophen (TYLENOL) 325 mg tablet    amLODIPine (NORVASC) 5 mg tablet    ammonium lactate (LAC-HYDRIN) 12 % lotion    Benzoyl Peroxide (benzoyl peroxide) 10 % LIQD    divalproex sodium (DEPAKOTE ER) 500 mg 24 hr tablet    docusate sodium (COLACE) 100 mg capsule    gabapentin (NEURONTIN) 800 mg tablet    L-Methylfolate 15 MG TABS    lithium carbonate (LITHOBID) 450 mg CR tablet    Menthol-Ascorbic Acid (LUDENS COUGH DROPS) 3-60 MG LOZG    OLANZapine (ZyPREXA) 20 MG tablet    omeprazole (PriLOSEC) 20 mg delayed release capsule    polyethylene glycol (MIRALAX) 17 g packet    pyrithione zinc (HEAD AND SHOULDERS) 1 % shampoo    SODIUM FLUORIDE, DENTAL RINSE, (LISTERINE SMART RINSE) 0 02 % SOLN    Sulfacetamide Sodium, Acne, 10 % LOTN    Sunscreens (SUNBLOCK SPF30) LOTN    Camphor-Eucalyptus-Menthol (VICKS VAPORUB) 4 7-1 2-2 6 % OINT    dextromethorphan-guaiFENesin (ROBITUSSIN DM)  mg/5 mL syrup    ibuprofen (MOTRIN) 600 mg tablet    lanolin-mineral oil (BABY OIL) OIL    phenol (SORE THROAT SPRAY) 1 4 % mucosal liquid    No Known Allergies        Objective     Blood pressure 118/82, pulse 98, temperature (!) 95 °F (35 °C), temperature source Tympanic, height 5' 9 9" (1 775 m), weight 96 6 kg (213 lb)  Body mass index is 30 65 kg/m²  PHYSICAL EXAM:      General Appearance:   Alert, cooperative, poor dentition and unique appearance related to possible underlying neurological/psychiatric disorder   HEENT:   Normocephalic, atraumatic, anicteric      Neck:  Supple, symmetrical, trachea midline   Lungs:   Clear to auscultation bilaterally; no rales, rhonchi or wheezing; respirations unlabored    Heart[de-identified]   Regular rate and rhythm; no murmur, rub, or gallop     Abdomen:   Soft, non-tender, non-distended; normal bowel sounds; no masses, no organomegaly    Genitalia:   Deferred    Rectal:   Deferred    Extremities:  No cyanosis, clubbing or edema    Pulses:  2+ and symmetric    Skin:  No jaundice, rashes, or lesions    Lymph nodes:  No palpable cervical lymphadenopathy        Lab Results:   No visits with results within 1 Day(s) from this visit  Latest known visit with results is:   Appointment on 08/09/2019   Component Date Value    Valproic Acid, Free 08/09/2019 15 7          Radiology Results:   No results found

## 2019-09-17 ENCOUNTER — TELEPHONE (OUTPATIENT)
Dept: GASTROENTEROLOGY | Facility: CLINIC | Age: 34
End: 2019-09-17

## 2019-09-17 ENCOUNTER — HOSPITAL ENCOUNTER (OUTPATIENT)
Dept: ULTRASOUND IMAGING | Facility: HOSPITAL | Age: 34
Discharge: HOME | End: 2019-09-17
Attending: INTERNAL MEDICINE
Payer: COMMERCIAL

## 2019-09-17 DIAGNOSIS — R10.13 EPIGASTRIC ABDOMINAL PAIN: ICD-10-CM

## 2019-09-17 PROCEDURE — 76700 US EXAM ABDOM COMPLETE: CPT

## 2019-09-17 NOTE — TELEPHONE ENCOUNTER
Dr Rebecca Kemp pt  [facility] called stating there are significant findings in pt's US ABD ready to be read in epic   also text

## 2019-09-18 NOTE — TELEPHONE ENCOUNTER
Spoke to Robby one of his caregivers and he is aware of the results and he will contact his pcp to set up appt

## 2019-09-19 ENCOUNTER — OFFICE VISIT (OUTPATIENT)
Dept: FAMILY MEDICINE CLINIC | Facility: CLINIC | Age: 34
End: 2019-09-19

## 2019-09-19 VITALS
SYSTOLIC BLOOD PRESSURE: 130 MMHG | RESPIRATION RATE: 16 BRPM | WEIGHT: 215.2 LBS | TEMPERATURE: 98.9 F | DIASTOLIC BLOOD PRESSURE: 90 MMHG | HEIGHT: 70 IN | BODY MASS INDEX: 30.81 KG/M2 | HEART RATE: 86 BPM

## 2019-09-19 DIAGNOSIS — N13.30 HYDRONEPHROSIS, UNSPECIFIED HYDRONEPHROSIS TYPE: ICD-10-CM

## 2019-09-19 DIAGNOSIS — K21.9 GASTROESOPHAGEAL REFLUX DISEASE WITHOUT ESOPHAGITIS: Primary | ICD-10-CM

## 2019-09-19 PROCEDURE — 3008F BODY MASS INDEX DOCD: CPT | Performed by: FAMILY MEDICINE

## 2019-09-19 PROCEDURE — 1036F TOBACCO NON-USER: CPT | Performed by: FAMILY MEDICINE

## 2019-09-19 PROCEDURE — 99213 OFFICE O/P EST LOW 20 MIN: CPT | Performed by: FAMILY MEDICINE

## 2019-09-19 NOTE — ASSESSMENT & PLAN NOTE
U/S findings of mild hydronephrosis  Patient denies any  complaints  Will continue to monitor and consider  referral   Precautions reviewed with patient and caregiver  RTC PRN

## 2019-09-19 NOTE — PROGRESS NOTES
Assessment/Plan: Rufino Bey is a 36 y o  male with:   Problem List Items Addressed This Visit        Digestive    GERD (gastroesophageal reflux disease) - Primary     Abdominal US results reviewed with patient and caregiver  Management per GI  Patient is currently on Prilosec 20 mg PO daily  Abdominal pain has resolved, abdominal exam benign  Encouraged follow up with Gi as scheduled  RTC PRN            Genitourinary    Hydronephrosis     U/S findings of mild hydronephrosis  Patient denies any  complaints  Will continue to monitor and consider  referral   Precautions reviewed with patient and caregiver  RTC PRN               Chief Complaint:  Chief Complaint   Patient presents with    Results     ultra sound      HPI:   Rufino Bey is a 36 y o  male is here for evaluation for follow up of abdominal U/S that was ordered by GI for GERD with epigastric pain approximately one week ago  Patient reports complete resolution of abdominal pain and has no acute complaints  Patient denies fevers, chills, chest pain, nausea, vomiting, abdominal pain, diarrhea, constipation, hematochezia or melanotic stools  Patient is on mechanical soft diet for having near endentulism, but denies any difficulty swallowing or pain on swallowing  Per patient and caregivers no hematuria, flank pain, back pain, recurrent UTI's  History:  Current Outpatient Medications   Medication Sig Dispense Refill    acetaminophen (TYLENOL) 325 mg tablet Take 650 mg by mouth every 6 (six) hours as needed for mild pain        amLODIPine (NORVASC) 5 mg tablet Take 1 tablet (5 mg total) by mouth daily 30 tablet 5    ammonium lactate (LAC-HYDRIN) 12 % lotion Apply topically to affected area on skin twice daily as needed for eczema 400 g 1    Benzoyl Peroxide (benzoyl peroxide) 10 % LIQD Apply 1 application topically daily as needed (USE TO [address] FACE/ SHOULDERS/BACK DAILY AS NEEDED (ACNE)*STOP USING IFEXCESSIVE IRRIT/RED) Use daily to wash face,shoulders and back as needed for acne 237 g 5    Camphor-Eucalyptus-Menthol (VICKS VAPORUB) 4 7-1 2-2 6 % OINT Apply topically 2 (two) times a day as needed (PRN) 50 g 11    dextromethorphan-guaiFENesin (ROBITUSSIN DM)  mg/5 mL syrup Take 5 mL by mouth 3 (three) times a day as needed for cough or congestion 118 mL 11    divalproex sodium (DEPAKOTE ER) 500 mg 24 hr tablet Take 3 tablets (1500 mg total) by mouth once daily at 5 pm  0    docusate sodium (COLACE) 100 mg capsule Take 1 capsule (100 mg total) by mouth 2 times daily at 8 am and 5 pm for constipation  Hold for loose stools   10 capsule 0    gabapentin (NEURONTIN) 800 mg tablet Take 1 tablet (800 mg total) by mouth 3 times daily at 8 am, 4 pm, and 8 pm  0    GNP TUSSIN -10 MG/5ML oral liquid       ibuprofen (MOTRIN) 600 mg tablet Take 1 tablet (600 mg total) by mouth every 6 (six) hours as needed for mild pain, moderate pain, fever or headaches 30 tablet 11    L-Methylfolate 15 MG TABS Take 1 tablet (15 mg total) by mouth daily at 8 am  0    lanolin-mineral oil (BABY OIL) OIL Apply topically as needed for dry skin 1 Bottle 11    lithium carbonate (LITHOBID) 450 mg CR tablet Take 2 tablets (900 mg total) by mouth daily at 5:30 pm  0    Menthol-Ascorbic Acid (LUDENS COUGH DROPS) 3-60 MG LOZG Apply 3-60 mg to the mouth or throat 4 (four) times a day as needed (cough) 30 each 0    OLANZapine (ZyPREXA) 20 MG tablet Take 0 5 tablet (10 mg) by mouth twice daily at 12 pm and 8 pm  0    omeprazole (PriLOSEC) 20 mg delayed release capsule Take 1 capsule (20 mg total) by mouth daily 31 capsule 11    phenol (SORE THROAT SPRAY) 1 4 % mucosal liquid Apply 1 spray to the mouth or throat every 2 (two) hours as needed (PRN) 1 Bottle 11    polyethylene glycol (MIRALAX) 17 g packet Take 17 g by mouth daily      pyrithione zinc (HEAD AND SHOULDERS) 1 % shampoo Apply topically daily as needed for dandruff 240 mL 5    SODIUM FLUORIDE, DENTAL RINSE, (LISTERINE SMART RINSE) 0 02 % SOLN Rinse mouth twice daily as needed 800 mL 11    Sulfacetamide Sodium, Acne, 10 % LOTN Apply topically 2 (two) times a day 118 mL 5    Sunscreens (SUNBLOCK SPF30) LOTN Apply every 2 hours up to five times daily 1 Bottle 5     No current facility-administered medications for this visit  PMH reviewed  ROS:  As Per HPI    Physical Exam:  /90   Pulse 86   Temp 98 9 °F (37 2 °C)   Resp 16   Ht 5' 10 4" (1 788 m)   Wt 97 6 kg (215 lb 3 2 oz)   BMI 30 53 kg/m²   Physical Exam   Constitutional: No distress  HENT:   Head: Normocephalic and atraumatic  Right Ear: External ear normal    Left Ear: External ear normal    Mouth/Throat: Oropharynx is clear and moist  No oropharyngeal exudate  Eyes: Conjunctivae are normal  Right eye exhibits no discharge  Left eye exhibits no discharge  No scleral icterus  Neck: Neck supple  Cardiovascular: Normal rate, regular rhythm and normal heart sounds  Exam reveals no friction rub  No murmur heard  Pulmonary/Chest: Effort normal and breath sounds normal  No respiratory distress  He has no wheezes  Abdominal: Soft  Normal appearance and bowel sounds are normal  He exhibits no distension and no mass  There is no tenderness  There is no rebound and no guarding  No hernia  Neurological: He is alert  Skin: Skin is warm and dry  He is not diaphoretic  Vitals reviewed  No future appointments      Mike Gilliam MD

## 2019-09-19 NOTE — ASSESSMENT & PLAN NOTE
Abdominal US results reviewed with patient and caregiver  Management per GI  Patient is currently on Prilosec 20 mg PO daily  Abdominal pain has resolved, abdominal exam benign    Encouraged follow up with Gi as scheduled  RTC PRN

## 2019-10-11 ENCOUNTER — APPOINTMENT (OUTPATIENT)
Dept: LAB | Age: 34
End: 2019-10-11
Payer: COMMERCIAL

## 2019-10-11 DIAGNOSIS — R10.13 EPIGASTRIC ABDOMINAL PAIN: ICD-10-CM

## 2019-10-11 LAB
ALBUMIN SERPL BCP-MCNC: 4.1 G/DL (ref 3.5–5)
ALP SERPL-CCNC: 46 U/L (ref 46–116)
ALT SERPL W P-5'-P-CCNC: 35 U/L (ref 12–78)
ANION GAP SERPL CALCULATED.3IONS-SCNC: 4 MMOL/L (ref 4–13)
AST SERPL W P-5'-P-CCNC: 18 U/L (ref 5–45)
BASOPHILS # BLD AUTO: 0.06 THOUSANDS/ΜL (ref 0–0.1)
BASOPHILS NFR BLD AUTO: 1 % (ref 0–1)
BILIRUB SERPL-MCNC: 0.23 MG/DL (ref 0.2–1)
BUN SERPL-MCNC: 22 MG/DL (ref 5–25)
CALCIUM SERPL-MCNC: 9.8 MG/DL (ref 8.3–10.1)
CHLORIDE SERPL-SCNC: 109 MMOL/L (ref 100–108)
CO2 SERPL-SCNC: 29 MMOL/L (ref 21–32)
CREAT SERPL-MCNC: 0.91 MG/DL (ref 0.6–1.3)
EOSINOPHIL # BLD AUTO: 0.11 THOUSAND/ΜL (ref 0–0.61)
EOSINOPHIL NFR BLD AUTO: 1 % (ref 0–6)
ERYTHROCYTE [DISTWIDTH] IN BLOOD BY AUTOMATED COUNT: 12.3 % (ref 11.6–15.1)
GFR SERPL CREATININE-BSD FRML MDRD: 110 ML/MIN/1.73SQ M
GLUCOSE P FAST SERPL-MCNC: 101 MG/DL (ref 65–99)
HCT VFR BLD AUTO: 49 % (ref 36.5–49.3)
HGB BLD-MCNC: 14.9 G/DL (ref 12–17)
IMM GRANULOCYTES # BLD AUTO: 0.05 THOUSAND/UL (ref 0–0.2)
IMM GRANULOCYTES NFR BLD AUTO: 1 % (ref 0–2)
LIPASE SERPL-CCNC: 96 U/L (ref 73–393)
LYMPHOCYTES # BLD AUTO: 2.59 THOUSANDS/ΜL (ref 0.6–4.47)
LYMPHOCYTES NFR BLD AUTO: 31 % (ref 14–44)
MCH RBC QN AUTO: 28.5 PG (ref 26.8–34.3)
MCHC RBC AUTO-ENTMCNC: 30.4 G/DL (ref 31.4–37.4)
MCV RBC AUTO: 94 FL (ref 82–98)
MONOCYTES # BLD AUTO: 0.9 THOUSAND/ΜL (ref 0.17–1.22)
MONOCYTES NFR BLD AUTO: 11 % (ref 4–12)
NEUTROPHILS # BLD AUTO: 4.57 THOUSANDS/ΜL (ref 1.85–7.62)
NEUTS SEG NFR BLD AUTO: 55 % (ref 43–75)
NRBC BLD AUTO-RTO: 0 /100 WBCS
PLATELET # BLD AUTO: 222 THOUSANDS/UL (ref 149–390)
PMV BLD AUTO: 11.7 FL (ref 8.9–12.7)
POTASSIUM SERPL-SCNC: 4.5 MMOL/L (ref 3.5–5.3)
PROT SERPL-MCNC: 7.8 G/DL (ref 6.4–8.2)
RBC # BLD AUTO: 5.22 MILLION/UL (ref 3.88–5.62)
SODIUM SERPL-SCNC: 142 MMOL/L (ref 136–145)
TSH SERPL DL<=0.05 MIU/L-ACNC: 0.74 UIU/ML (ref 0.36–3.74)
WBC # BLD AUTO: 8.28 THOUSAND/UL (ref 4.31–10.16)

## 2019-10-11 PROCEDURE — 82784 ASSAY IGA/IGD/IGG/IGM EACH: CPT

## 2019-10-11 PROCEDURE — 84443 ASSAY THYROID STIM HORMONE: CPT

## 2019-10-11 PROCEDURE — 86255 FLUORESCENT ANTIBODY SCREEN: CPT

## 2019-10-11 PROCEDURE — 36415 COLL VENOUS BLD VENIPUNCTURE: CPT

## 2019-10-11 PROCEDURE — 85025 COMPLETE CBC W/AUTO DIFF WBC: CPT

## 2019-10-11 PROCEDURE — 80053 COMPREHEN METABOLIC PANEL: CPT

## 2019-10-11 PROCEDURE — 83516 IMMUNOASSAY NONANTIBODY: CPT

## 2019-10-11 PROCEDURE — 83690 ASSAY OF LIPASE: CPT

## 2019-10-14 LAB
ENDOMYSIUM IGA SER QL: NEGATIVE
GLIADIN PEPTIDE IGA SER-ACNC: 5 UNITS (ref 0–19)
GLIADIN PEPTIDE IGG SER-ACNC: 5 UNITS (ref 0–19)
IGA SERPL-MCNC: 345 MG/DL (ref 90–386)
TTG IGA SER-ACNC: <2 U/ML (ref 0–3)
TTG IGG SER-ACNC: <2 U/ML (ref 0–5)

## 2019-10-17 ENCOUNTER — APPOINTMENT (OUTPATIENT)
Dept: LAB | Age: 34
End: 2019-10-17
Payer: COMMERCIAL

## 2019-10-17 DIAGNOSIS — R10.13 EPIGASTRIC ABDOMINAL PAIN: ICD-10-CM

## 2019-10-17 PROCEDURE — 87338 HPYLORI STOOL AG IA: CPT

## 2019-10-19 LAB — H PYLORI AG STL QL IA: NEGATIVE

## 2019-11-06 DIAGNOSIS — I10 ESSENTIAL HYPERTENSION: ICD-10-CM

## 2019-11-06 RX ORDER — AMLODIPINE BESYLATE 5 MG/1
5 TABLET ORAL DAILY
Qty: 30 TABLET | Refills: 9 | Status: SHIPPED | OUTPATIENT
Start: 2019-11-06 | End: 2020-09-03

## 2019-11-21 DIAGNOSIS — F98.4 HEAD-BANGING: Primary | ICD-10-CM

## 2019-11-21 RX ORDER — ACETAMINOPHEN 325 MG/1
325 TABLET ORAL EVERY 4 HOURS PRN
Qty: 30 TABLET | Refills: 5 | Status: SHIPPED | OUTPATIENT
Start: 2019-11-21 | End: 2021-02-22 | Stop reason: SDUPTHER

## 2019-12-10 DIAGNOSIS — L30.9 DERMATITIS: ICD-10-CM

## 2019-12-10 RX ORDER — AMMONIUM LACTATE 12 G/100G
LOTION TOPICAL
Qty: 225 G | Refills: 5 | Status: SHIPPED | OUTPATIENT
Start: 2019-12-10 | End: 2021-03-02 | Stop reason: SDUPTHER

## 2019-12-13 DIAGNOSIS — K59.00 CONSTIPATION: ICD-10-CM

## 2019-12-13 DIAGNOSIS — F31.9 BIPOLAR 1 DISORDER (HCC): ICD-10-CM

## 2019-12-14 RX ORDER — DIVALPROEX SODIUM 500 MG/1
TABLET, EXTENDED RELEASE ORAL
Qty: 30 TABLET | Refills: 6 | Status: SHIPPED | OUTPATIENT
Start: 2019-12-14

## 2019-12-14 RX ORDER — DOCUSATE SODIUM 100 MG/1
CAPSULE, LIQUID FILLED ORAL
Qty: 60 CAPSULE | Refills: 6 | Status: SHIPPED | OUTPATIENT
Start: 2019-12-14 | End: 2020-07-01

## 2019-12-17 ENCOUNTER — CLINICAL SUPPORT (OUTPATIENT)
Dept: FAMILY MEDICINE CLINIC | Facility: CLINIC | Age: 34
End: 2019-12-17

## 2019-12-17 DIAGNOSIS — Z11.1 PPD SCREENING TEST: Primary | ICD-10-CM

## 2019-12-17 PROCEDURE — 86580 TB INTRADERMAL TEST: CPT | Performed by: FAMILY MEDICINE

## 2019-12-19 ENCOUNTER — OFFICE VISIT (OUTPATIENT)
Dept: FAMILY MEDICINE CLINIC | Facility: CLINIC | Age: 34
End: 2019-12-19

## 2019-12-19 VITALS
BODY MASS INDEX: 31.81 KG/M2 | WEIGHT: 222.2 LBS | TEMPERATURE: 97.4 F | RESPIRATION RATE: 18 BRPM | HEART RATE: 80 BPM | HEIGHT: 70 IN | DIASTOLIC BLOOD PRESSURE: 80 MMHG | SYSTOLIC BLOOD PRESSURE: 122 MMHG

## 2019-12-19 DIAGNOSIS — K21.9 GASTROESOPHAGEAL REFLUX DISEASE WITHOUT ESOPHAGITIS: ICD-10-CM

## 2019-12-19 DIAGNOSIS — R10.13 EPIGASTRIC ABDOMINAL PAIN: ICD-10-CM

## 2019-12-19 DIAGNOSIS — K59.00 CONSTIPATION, UNSPECIFIED CONSTIPATION TYPE: Primary | ICD-10-CM

## 2019-12-19 PROBLEM — J06.9 URI (UPPER RESPIRATORY INFECTION): Status: RESOLVED | Noted: 2019-02-01 | Resolved: 2019-12-19

## 2019-12-19 LAB
INDURATION: 0 MM
TB SKIN TEST: NEGATIVE

## 2019-12-19 PROCEDURE — 3008F BODY MASS INDEX DOCD: CPT | Performed by: FAMILY MEDICINE

## 2019-12-19 PROCEDURE — 99213 OFFICE O/P EST LOW 20 MIN: CPT | Performed by: FAMILY MEDICINE

## 2019-12-19 RX ORDER — LORAZEPAM 0.5 MG/1
1 TABLET ORAL 2 TIMES DAILY
COMMUNITY
Start: 2019-12-16 | End: 2022-07-19

## 2019-12-19 NOTE — ASSESSMENT & PLAN NOTE
Adequately controlled  Encouraged adherence with Colace 100 mg PO BID and increased PO hydration  Precautions reviewed    RTC in 3 months or sooner PRN

## 2019-12-19 NOTE — PROGRESS NOTES
Assessment/Plan: Giana Escobar is a 36 y o  male with:     Problem List Items Addressed This Visit        Digestive    GERD (gastroesophageal reflux disease)     Well controlled on current Prilosec 20 mg PO daily  Epigastric abdominal pain has resolved since last visit and patient has not followed up with GI for that reason  He's established with GI  Encouraged routine follow up with GI and adherence with medications  Return precautions reviewed  Other    Constipation - Primary     Adequately controlled  Encouraged adherence with Colace 100 mg PO BID and increased PO hydration  Precautions reviewed  RTC in 3 months or sooner PRN         RESOLVED: Epigastric abdominal pain        -Counseling regarding flu vaccine provided  Patient is up to date  -PPD skin negative today  Chief Complaint:  Chief Complaint   Patient presents with    TB Test     following up      HPI:   Giana Escobar is a 36 y o  male who is here for routine follow up and PPD test read  He was last seen in office for GERD with epigastric pain which has since resolved per patient and caregivers  Patient denies nausea, vomiting, abdominal pain, diarrhea, hematochezia or melanotic stools  He's been at baseline state of health otherwise and adherent with all medications  He has chronic constipation which he is taking Colace 100 mg PO daily for and is adequately controlled  He was recently seen by psychiatry for worsening "behavioral issues" and was thought to be due to anxiety  Psychiatry initiated Ativan 0 5 mg PO BID for anxiety      History:  Current Outpatient Medications   Medication Sig Dispense Refill    acetaminophen (TYLENOL) 325 mg tablet Take 1 tablet (325 mg total) by mouth every 4 (four) hours as needed for mild pain or fever 30 tablet 5    amLODIPine (NORVASC) 5 mg tablet Take 1 tablet (5 mg total) by mouth daily 30 tablet 9    ammonium lactate (LAC-HYDRIN) 12 % lotion Apply topically to affected area on skin twice daily as needed for eczema 225 g 5    Benzoyl Peroxide (benzoyl peroxide) 10 % LIQD Apply 1 application topically daily as needed (USE TO [address] FACE/ SHOULDERS/BACK DAILY AS NEEDED (ACNE)*STOP USING IFEXCESSIVE IRRIT/RED) Use daily to wash face,shoulders and back as needed for acne 237 g 5    Camphor-Eucalyptus-Menthol (VICKS VAPORUB) 4 7-1 2-2 6 % OINT Apply topically 2 (two) times a day as needed (PRN) 50 g 11    dextromethorphan-guaiFENesin (ROBITUSSIN DM)  mg/5 mL syrup Take 5 mL by mouth 3 (three) times a day as needed for cough or congestion 118 mL 11    divalproex sodium (DEPAKOTE ER) 500 mg 24 hr tablet Take 3 tablets (1500 mg total) by mouth once daily at 5 pm 30 tablet 6    docusate sodium (COLACE) 100 mg capsule Take 1 capsule (100 mg total) by mouth 2 times daily at 8 am and 5 pm for constipation  Hold for loose stools   60 capsule 6    gabapentin (NEURONTIN) 800 mg tablet Take 1 tablet (800 mg total) by mouth 3 times daily at 8 am, 4 pm, and 8 pm  0    GNP TUSSIN -10 MG/5ML oral liquid       ibuprofen (MOTRIN) 600 mg tablet Take 1 tablet (600 mg total) by mouth every 6 (six) hours as needed for mild pain, moderate pain, fever or headaches 30 tablet 11    L-Methylfolate 15 MG TABS Take 1 tablet (15 mg total) by mouth daily at 8 am  0    lanolin-mineral oil (BABY OIL) OIL Apply topically as needed for dry skin 1 Bottle 11    lithium carbonate (LITHOBID) 450 mg CR tablet Take 2 tablets (900 mg total) by mouth daily at 5:30 pm  0    LORazepam (ATIVAN) 0 5 mg tablet       Menthol-Ascorbic Acid (LUDENS COUGH DROPS) 3-60 MG LOZG Apply 3-60 mg to the mouth or throat 4 (four) times a day as needed (cough) 30 each 0    OLANZapine (ZyPREXA) 20 MG tablet Take 0 5 tablet (10 mg) by mouth twice daily at 12 pm and 8 pm  0    omeprazole (PriLOSEC) 20 mg delayed release capsule Take 1 capsule (20 mg total) by mouth daily 31 capsule 11    phenol (SORE THROAT SPRAY) 1 4 % mucosal liquid Apply 1 spray to the mouth or throat every 2 (two) hours as needed (PRN) 1 Bottle 11    polyethylene glycol (MIRALAX) 17 g packet Take 17 g by mouth daily      pyrithione zinc (HEAD AND SHOULDERS) 1 % shampoo Apply topically daily as needed for dandruff 240 mL 5    SODIUM FLUORIDE, DENTAL RINSE, (LISTERINE SMART RINSE) 0 02 % SOLN Rinse mouth twice daily as needed 800 mL 11    Sulfacetamide Sodium, Acne, 10 % LOTN Apply topically 2 (two) times a day 118 mL 5    Sunscreens (SUNBLOCK SPF30) LOTN Apply every 2 hours up to five times daily 1 Bottle 5     No current facility-administered medications for this visit  PMH reviewed  ROS:  As Per HPI    Physical Exam:  /80   Pulse 80   Temp (!) 97 4 °F (36 3 °C)   Resp 18   Ht 5' 10 4" (1 788 m)   Wt 101 kg (222 lb 3 2 oz)   BMI 31 52 kg/m²   Physical Exam   Constitutional: No distress  HENT:   Head: Normocephalic and atraumatic  Right Ear: External ear normal    Left Ear: External ear normal    Nose: Nose normal    Eyes: EOM are normal  Right eye exhibits no discharge  Left eye exhibits no discharge  No scleral icterus  Neck: Neck supple  No JVD present  Cardiovascular: Normal rate, regular rhythm and normal heart sounds  Exam reveals no friction rub  No murmur heard  Pulmonary/Chest: Effort normal and breath sounds normal  No respiratory distress  He has no wheezes  He has no rales  Abdominal: Soft  Normal appearance and bowel sounds are normal  There is no tenderness  There is no guarding  Musculoskeletal: He exhibits no edema or tenderness  Neurological: He is alert  Skin: Skin is warm and dry  Capillary refill takes 2 to 3 seconds  He is not diaphoretic  Psychiatric: He has a normal mood and affect  Thought content normal    Vitals reviewed        Future Appointments   Date Time Provider Keshav Philippe   1/13/2020 11:00 AM Heath Zaidi PA-C [facility]   3/19/2020  1:00 PM Kenyatta Jerome MD [facility] Nick Gruber MD

## 2019-12-19 NOTE — ASSESSMENT & PLAN NOTE
Well controlled on current Prilosec 20 mg PO daily  Epigastric abdominal pain has resolved since last visit and patient has not followed up with GI for that reason  He's established with GI  Encouraged routine follow up with GI and adherence with medications  Return precautions reviewed

## 2020-01-13 ENCOUNTER — OFFICE VISIT (OUTPATIENT)
Dept: GASTROENTEROLOGY | Facility: MEDICAL CENTER | Age: 35
End: 2020-01-13
Payer: COMMERCIAL

## 2020-01-13 VITALS
SYSTOLIC BLOOD PRESSURE: 134 MMHG | HEIGHT: 70 IN | WEIGHT: 217 LBS | BODY MASS INDEX: 31.07 KG/M2 | HEART RATE: 77 BPM | TEMPERATURE: 98.2 F | DIASTOLIC BLOOD PRESSURE: 83 MMHG

## 2020-01-13 DIAGNOSIS — K21.9 GASTROESOPHAGEAL REFLUX DISEASE WITHOUT ESOPHAGITIS: Primary | ICD-10-CM

## 2020-01-13 DIAGNOSIS — K59.00 CONSTIPATION, UNSPECIFIED CONSTIPATION TYPE: ICD-10-CM

## 2020-01-13 PROCEDURE — 99214 OFFICE O/P EST MOD 30 MIN: CPT | Performed by: PHYSICIAN ASSISTANT

## 2020-01-13 NOTE — PROGRESS NOTES
Assessment/Plan:     Diagnoses and all orders for this visit:    Gastroesophageal reflux disease without esophagitis  Patient has a history of GERD for which he takes omeprazole 20 mg daily  Given he has no symptoms I did see does possibly stopping the medication altogether  His caregivers feel that since he has been on the medication for some time and his symptoms are under good control they do not want to discontinue  Constipation, unspecified constipation type  Patient previously seen for constipation  His symptoms are under good control with Colace  Would recommend continuing  Will see him back on an as-needed basis  Subjective:      Patient ID: Natty eD La Vega is a 36 y o  male  HPI     This is a follow-up for GERD and constipation  Patient has a history of schizophrenia with possible underlying mental retardation  He is here with his  as well as to aides who contribute to the history  He was previously seen for GERD and currently is on omeprazole 20 mg daily  He denies any symptoms of heartburn or reflux  He denies any abdominal pain  He they state his constipation is under good control with Colace  He was previously checked for celiac, H pylori, lipase, CBC, CMP and TSH which were within normal limits  He also had an ultrasound which showed mild hydronephrosis but liver and gallbladder appeared within normal limits  He has no other complaints at this time      Patient Active Problem List   Diagnosis    Bipolar 1 disorder [facility] [address] [facility]    Constipation    Poor dentition    Anxiety    Benign essential hypertension    Cystic acne    Depression    GERD (gastroesophageal reflux disease)    Head-banging    Intermittent explosive disorder    Seborrhea capitis    Abnormal gait    [facility]    Swallowing/mastication problem    Abdominal pain    Healthcare maintenance    Hydronephrosis     No Known Allergies  Current Outpatient Medications on File Prior to Visit   Medication Sig    acetaminophen (TYLENOL) 325 mg tablet Take 1 tablet (325 mg total) by mouth every 4 (four) hours as needed for mild pain or fever    amLODIPine (NORVASC) 5 mg tablet Take 1 tablet (5 mg total) by mouth daily    ammonium lactate (LAC-HYDRIN) 12 % lotion Apply topically to affected area on skin twice daily as needed for eczema    Benzoyl Peroxide (benzoyl peroxide) 10 % LIQD Apply 1 application topically daily as needed (USE TO [address] FACE/ SHOULDERS/BACK DAILY AS NEEDED (ACNE)*STOP USING IFEXCESSIVE IRRIT/RED) Use daily to wash face,shoulders and back as needed for acne    Camphor-Eucalyptus-Menthol (VICKS VAPORUB) 4 7-1 2-2 6 % OINT Apply topically 2 (two) times a day as needed (PRN)    dextromethorphan-guaiFENesin (ROBITUSSIN DM)  mg/5 mL syrup Take 5 mL by mouth 3 (three) times a day as needed for cough or congestion    divalproex sodium (DEPAKOTE ER) 500 mg 24 hr tablet Take 3 tablets (1500 mg total) by mouth once daily at 5 pm    docusate sodium (COLACE) 100 mg capsule Take 1 capsule (100 mg total) by mouth 2 times daily at 8 am and 5 pm for constipation  Hold for loose stools      gabapentin (NEURONTIN) 800 mg tablet Take 1 tablet (800 mg total) by mouth 3 times daily at 8 am, 4 pm, and 8 pm    GNP TUSSIN -10 MG/5ML oral liquid     ibuprofen (MOTRIN) 600 mg tablet Take 1 tablet (600 mg total) by mouth every 6 (six) hours as needed for mild pain, moderate pain, fever or headaches    L-Methylfolate 15 MG TABS Take 1 tablet (15 mg total) by mouth daily at 8 am    lanolin-mineral oil (BABY OIL) OIL Apply topically as needed for dry skin    lithium carbonate (LITHOBID) 450 mg CR tablet Take 2 tablets (900 mg total) by mouth daily at 5:30 pm    LORazepam (ATIVAN) 0 5 mg tablet     Menthol-Ascorbic Acid (LUDENS COUGH DROPS) 3-60 MG LOZG Apply 3-60 mg to the mouth or throat 4 (four) times a day as needed (cough)    OLANZapine (ZyPREXA) 20 MG tablet Take 0 5 tablet (10 mg) by mouth twice daily at 12 pm and 8 pm    omeprazole (PriLOSEC) 20 mg delayed release capsule Take 1 capsule (20 mg total) by mouth daily    phenol (SORE THROAT SPRAY) 1 4 % mucosal liquid Apply 1 spray to the mouth or throat every 2 (two) hours as needed (PRN)    polyethylene glycol (MIRALAX) 17 g packet Take 17 g by mouth daily    pyrithione zinc (HEAD AND SHOULDERS) 1 % shampoo Apply topically daily as needed for dandruff    SODIUM FLUORIDE, DENTAL RINSE, (LISTERINE SMART RINSE) 0 02 % SOLN Rinse mouth twice daily as needed    Sulfacetamide Sodium, Acne, 10 % LOTN Apply topically 2 (two) times a day    Sunscreens (SUNBLOCK SPF30) LOTN Apply every 2 hours up to five times daily     No current facility-administered medications on file prior to visit  No family history on file    Past Medical History:   Diagnosis Date    ADHD (attention deficit hyperactivity disorder)     Atypical pervasive developmental disorder     Autism     [facility] disorder [facility] [address] [facility]     Constipation     Depression     Head-banging     Last Assessed:  9/1/17    Hydronephrosis 9/19/2019    Hyperlipidemia     Hypertension     Intermittent explosive disorder     Oppositional defiant disorder     Personality disorder ([facility] [address] [facility]     Schizophrenia ([facility] [address] [facility]     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[facility] [address] [facility]     Seizures ([facility] [address] )     Sleep disorder     Vitamin D insufficiency     Last Assessed:  6/22/17     Social History     Socioeconomic History    Marital status: Single     Spouse name: None    Number of children: None    Years of education: None    Highest education level: None   Occupational History    None   Social Needs    Financial resource strain: None    Food insecurity:     Worry: None     Inability: None    Transportation needs:     Medical: None     Non-medical: None   Tobacco Use    Smoking status: Never Smoker    Smokeless tobacco: Never Used   Substance and Sexual Activity    Alcohol use: No    Drug use: No    Sexual activity: None   Lifestyle    Physical activity:     Days per week: None     Minutes per session: None    Stress: None   Relationships    Social connections:     Talks on phone: None     Gets together: None     Attends [employer] witness service: None     Active member of club or organization: None     Attends meetings of clubs or organizations: None     Relationship status: None    Intimate partner violence:     Fear of current or ex partner: None     Emotionally abused: None     Physically abused: None     Forced sexual activity: None   Other Topics Concern    None   Social History Narrative    History of mold exposure     Past Surgical History:   Procedure Laterality Date    NO PAST SURGERIES      UMBILICAL HERNIA REPAIR           Review of Systems   All other systems reviewed and are negative  Objective:      /83 (BP Location: Right arm, Patient Position: Sitting, Cuff Size: Adult)   Pulse 77   Temp 98 2 °F (36 8 °C) (Tympanic)   Ht 5' 10" (1 778 m)   Wt 98 4 kg (217 lb)   BMI 31 14 kg/m²          Physical Exam   Constitutional: He is oriented to person, place, and time  He appears well-developed and well-nourished  HENT:   Head: Normocephalic  Eyes: Conjunctivae and EOM are normal    Neck: Normal range of motion  Cardiovascular: Normal rate and regular rhythm  Pulmonary/Chest: Effort normal and breath sounds normal    Abdominal: Soft  Bowel sounds are normal  He exhibits no distension  There is no tenderness  Musculoskeletal: Normal range of motion  Neurological: He is alert and oriented to person, place, and time  Skin: Skin is warm and dry  Psychiatric: He has a normal mood and affect   His behavior is normal

## 2020-03-19 ENCOUNTER — TELEPHONE (OUTPATIENT)
Dept: FAMILY MEDICINE CLINIC | Facility: CLINIC | Age: 35
End: 2020-03-19
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
## 2020-03-19 NOTE — TELEPHONE ENCOUNTER
Carolee Pepper called and requested refill on Sunscreens (SUNBLOCK SPF30) Joe Lundy [[id number]]     Patient uses the [facility] in [place]  With any questions, please call Carolee Pepper at

## 2020-04-03 ENCOUNTER — TELEMEDICINE (OUTPATIENT)
Dept: FAMILY MEDICINE CLINIC | Facility: CLINIC | Age: 35
End: 2020-04-03

## 2020-04-03 VITALS — WEIGHT: 217 LBS | TEMPERATURE: 99.5 F | HEIGHT: 70 IN | BODY MASS INDEX: 31.07 KG/M2

## 2020-04-03 DIAGNOSIS — J06.9 VIRAL UPPER RESPIRATORY TRACT INFECTION: Primary | ICD-10-CM

## 2020-04-03 PROCEDURE — 99213 OFFICE O/P EST LOW 20 MIN: CPT | Performed by: FAMILY MEDICINE

## 2020-04-03 PROCEDURE — T1015 CLINIC SERVICE: HCPCS | Performed by: FAMILY MEDICINE

## 2020-04-16 ENCOUNTER — APPOINTMENT (EMERGENCY)
Dept: CT IMAGING | Facility: HOSPITAL | Age: 35
End: 2020-04-16
Payer: COMMERCIAL

## 2020-04-16 ENCOUNTER — HOSPITAL ENCOUNTER (EMERGENCY)
Facility: HOSPITAL | Age: 35
Discharge: HOME | End: 2020-04-16
Attending: EMERGENCY MEDICINE | Admitting: EMERGENCY MEDICINE
Payer: COMMERCIAL

## 2020-04-16 VITALS
TEMPERATURE: 99.5 F | BODY MASS INDEX: 31.03 KG/M2 | SYSTOLIC BLOOD PRESSURE: 144 MMHG | OXYGEN SATURATION: 100 % | WEIGHT: 216.27 LBS | RESPIRATION RATE: 18 BRPM | HEART RATE: 98 BPM | DIASTOLIC BLOOD PRESSURE: 79 MMHG

## 2020-04-16 DIAGNOSIS — S09.90XA MINOR HEAD INJURY WITHOUT LOSS OF CONSCIOUSNESS, INITIAL ENCOUNTER: Primary | ICD-10-CM

## 2020-04-16 DIAGNOSIS — S00.01XA ABRASION OF SCALP, INITIAL ENCOUNTER: ICD-10-CM

## 2020-04-16 PROCEDURE — 99284 EMERGENCY DEPT VISIT MOD MDM: CPT

## 2020-04-16 PROCEDURE — 70450 CT HEAD/BRAIN W/O DYE: CPT

## 2020-04-16 PROCEDURE — 99284 EMERGENCY DEPT VISIT MOD MDM: CPT | Performed by: EMERGENCY MEDICINE

## 2020-04-21 ENCOUNTER — TELEPHONE (OUTPATIENT)
Dept: FAMILY MEDICINE CLINIC | Facility: CLINIC | Age: 35
End: 2020-04-21

## 2020-04-29 ENCOUNTER — TELEMEDICINE (OUTPATIENT)
Dept: FAMILY MEDICINE CLINIC | Facility: CLINIC | Age: 35
End: 2020-04-29

## 2020-04-29 VITALS — TEMPERATURE: 96.7 F | WEIGHT: 214 LBS | BODY MASS INDEX: 30.64 KG/M2 | HEIGHT: 70 IN

## 2020-04-29 DIAGNOSIS — H10.13 ALLERGIC CONJUNCTIVITIS OF BOTH EYES AND RHINITIS: Primary | ICD-10-CM

## 2020-04-29 DIAGNOSIS — J30.9 ALLERGIC CONJUNCTIVITIS OF BOTH EYES AND RHINITIS: Primary | ICD-10-CM

## 2020-04-29 PROCEDURE — T1015 CLINIC SERVICE: HCPCS | Performed by: FAMILY MEDICINE

## 2020-04-29 PROCEDURE — 99213 OFFICE O/P EST LOW 20 MIN: CPT | Performed by: FAMILY MEDICINE

## 2020-04-29 RX ORDER — FLUTICASONE PROPIONATE 50 MCG
1 SPRAY, SUSPENSION (ML) NASAL DAILY
Qty: 1 BOTTLE | Refills: 0 | Status: SHIPPED | OUTPATIENT
Start: 2020-04-29 | End: 2020-11-13 | Stop reason: SDUPTHER

## 2020-04-29 RX ORDER — CETIRIZINE HYDROCHLORIDE 5 MG/1
5 TABLET, CHEWABLE ORAL
Qty: 30 TABLET | Refills: 0 | Status: SHIPPED | OUTPATIENT
Start: 2020-04-29 | End: 2021-05-05 | Stop reason: SDUPTHER

## 2020-05-04 ENCOUNTER — TELEPHONE (OUTPATIENT)
Dept: FAMILY MEDICINE CLINIC | Facility: CLINIC | Age: 35
End: 2020-05-04

## 2020-06-15 DIAGNOSIS — K30 INDIGESTION: ICD-10-CM

## 2020-06-15 DIAGNOSIS — R19.7 DIARRHEA, UNSPECIFIED TYPE: Primary | ICD-10-CM

## 2020-07-01 DIAGNOSIS — K59.00 CONSTIPATION: ICD-10-CM

## 2020-07-01 DIAGNOSIS — K21.9 GASTROESOPHAGEAL REFLUX DISEASE, ESOPHAGITIS PRESENCE NOT SPECIFIED: ICD-10-CM

## 2020-07-01 RX ORDER — DOCUSATE SODIUM 100 MG/1
CAPSULE ORAL
Qty: 180 CAPSULE | Refills: 3 | Status: SHIPPED | OUTPATIENT
Start: 2020-07-01 | End: 2020-08-17 | Stop reason: SDUPTHER

## 2020-07-01 RX ORDER — OMEPRAZOLE 20 MG/1
CAPSULE, DELAYED RELEASE ORAL
Qty: 90 CAPSULE | Refills: 3 | Status: SHIPPED | OUTPATIENT
Start: 2020-07-01 | End: 2020-08-17 | Stop reason: SDUPTHER

## 2020-07-06 DIAGNOSIS — L70.0 ACNE VULGARIS: ICD-10-CM

## 2020-07-06 RX ORDER — SULFACETAMIDE SODIUM 100 MG/ML
LOTION TOPICAL 2 TIMES DAILY
Qty: 118 ML | Refills: 5 | Status: SHIPPED | OUTPATIENT
Start: 2020-07-06 | End: 2021-07-13 | Stop reason: SDUPTHER

## 2020-07-08 ENCOUNTER — OFFICE VISIT (OUTPATIENT)
Dept: FAMILY MEDICINE CLINIC | Facility: CLINIC | Age: 35
End: 2020-07-08

## 2020-07-08 VITALS
SYSTOLIC BLOOD PRESSURE: 122 MMHG | TEMPERATURE: 99.2 F | HEART RATE: 78 BPM | BODY MASS INDEX: 31.38 KG/M2 | RESPIRATION RATE: 14 BRPM | WEIGHT: 219.2 LBS | DIASTOLIC BLOOD PRESSURE: 76 MMHG | HEIGHT: 70 IN

## 2020-07-08 DIAGNOSIS — R19.7 DIARRHEA, UNSPECIFIED TYPE: ICD-10-CM

## 2020-07-08 DIAGNOSIS — K30 INDIGESTION: ICD-10-CM

## 2020-07-08 DIAGNOSIS — Z00.00 ANNUAL PHYSICAL EXAM: Primary | ICD-10-CM

## 2020-07-08 DIAGNOSIS — R73.09 ELEVATED GLUCOSE LEVEL: ICD-10-CM

## 2020-07-08 PROCEDURE — 99395 PREV VISIT EST AGE 18-39: CPT | Performed by: PHYSICIAN ASSISTANT

## 2020-07-08 RX ORDER — LEVOMEFOLATE/ALGAL OIL 15-90.314
15 CAPSULE ORAL DAILY
COMMUNITY

## 2020-07-08 NOTE — ASSESSMENT & PLAN NOTE
BMI Counseling: Body mass index is 31 45 kg/m²  The BMI is above normal  Nutrition recommendations include reducing portion sizes, decreasing overall calorie intake, 3-5 servings of fruits/vegetables daily, increasing intake of lean protein and reducing intake of cholesterol  Exercise recommendations include exercising 3-5 times per week      Immunizations up-to-date  Will check labs  Maintain scheduled follow-up appointment with Psychiatry  Physical form completed, copy made and original given to caregiver

## 2020-07-08 NOTE — PROGRESS NOTES
[facility]    NAME: Beau Wright  AGE: 30 y o  SEX: male  : 1985     DATE: 2020     Assessment and Plan:     Problem List Items Addressed This Visit        Other    Elevated glucose level    Relevant Orders    Hemoglobin A1C    Annual physical exam - Primary     BMI Counseling: Body mass index is 31 45 kg/m²  The BMI is above normal  Nutrition recommendations include reducing portion sizes, decreasing overall calorie intake, 3-5 servings of fruits/vegetables daily, increasing intake of lean protein and reducing intake of cholesterol  Exercise recommendations include exercising 3-5 times per week  Immunizations up-to-date  Will check labs  Physical form completed, copy made and original given to caregiver         Relevant Orders    Lipid panel    Comprehensive metabolic panel    BMI 06 4-48 5,NYQQT    Relevant Orders    Hemoglobin A1C      Other Visit Diagnoses     Diarrhea, unspecified type        Indigestion              Immunizations and preventive care screenings were discussed with patient today  Appropriate education was printed on patient's after visit summary  Counseling:  · Dental Health: discussed importance of regular tooth brushing, flossing, and dental visits  BMI Counseling: Body mass index is 31 45 kg/m²  The BMI is above normal  Nutrition recommendations include decreasing portion sizes, encouraging healthy choices of fruits and vegetables, moderation in carbohydrate intake and reducing intake of cholesterol  Exercise recommendations include exercising 3-5 times per week  No pharmacotherapy was ordered  Return in about 3 months (around 10/8/2020)       Chief Complaint:     Chief Complaint   Patient presents with    Physical Exam      History of Present Illness:     Adult Annual Physical   Patient here  With caregivers for a comprehensive physical exam Needs physical form completed  The patient reports no problems  Diet and Physical Activity  · Diet/Nutrition: well balanced diet and consuming 3-5 servings of fruits/vegetables daily  · Exercise: no formal exercise  Depression Screening  PHQ-9 Depression Screening    PHQ-9:    Frequency of the following problems over the past two weeks:            General Health  · Sleep: sleeps well  · Hearing: normal - bilateral   · Vision: wears glasses  · Dental: brushes teeth twice daily   Health  · History of STDs?: no      Review of Systems:     Review of Systems   Constitutional: Negative for chills, fatigue and fever  Respiratory: Negative for cough, chest tightness, shortness of breath and wheezing  Cardiovascular: Negative for chest pain and palpitations  Gastrointestinal: Negative for abdominal pain, constipation, diarrhea, nausea and vomiting  Genitourinary: Negative for difficulty urinating  Musculoskeletal: Negative for arthralgias, myalgias, neck pain and neck stiffness  Skin: Negative for rash and wound  Neurological: Negative for dizziness, weakness, light-headedness, numbness and headaches  Psychiatric/Behavioral: Negative for agitation and behavioral problems  The patient is not nervous/anxious         Past Medical History:     Past Medical History:   Diagnosis Date    ADHD (attention deficit hyperactivity disorder)     Atypical pervasive developmental disorder     Autism     Bipolar disorder ([facility] )     Constipation     Depression     Head-banging     Last Assessed:  9/1/17    Hydronephrosis 9/19/2019    Hyperlipidemia     Hypertension     Intermittent explosive disorder     Oppositional defiant disorder     Personality disorder ([facility] )     Schizophrenia ([facility] )     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[facility] )     Seizures ([facility] )     Sleep disorder     Vitamin D insufficiency     Last Assessed:  6/22/17      Past Surgical History:     Past Surgical History:   Procedure Laterality Date  NO PAST SURGERIES      UMBILICAL HERNIA REPAIR        Social History:     E-Cigarette/Vaping    E-Cigarette Use Never User      E-Cigarette/Vaping Substances    Nicotine No     THC No     CBD No     Flavoring No     Other No     Unknown No      Social History     Socioeconomic History    Marital status: Single     Spouse name: None    Number of children: None    Years of education: None    Highest education level: None   Occupational History    None   Social Needs    Financial resource strain: None    Food insecurity:     Worry: None     Inability: None    Transportation needs:     Medical: None     Non-medical: None   Tobacco Use    Smoking status: Never Smoker    Smokeless tobacco: Never Used   Substance and Sexual Activity    Alcohol use: No    Drug use: No    Sexual activity: None   Lifestyle    Physical activity:     Days per week: None     Minutes per session: None    Stress: None   Relationships    Social connections:     Talks on phone: None     Gets together: None     Attends Yazidi service: None     Active member of club or organization: None     Attends meetings of clubs or organizations: None     Relationship status: None    Intimate partner violence:     Fear of current or ex partner: None     Emotionally abused: None     Physically abused: None     Forced sexual activity: None   Other Topics Concern    None   Social History Narrative    History of mold exposure      Family History:     History reviewed  No pertinent family history     Current Medications:     Current Outpatient Medications   Medication Sig Dispense Refill    acetaminophen (TYLENOL) 325 mg tablet Take 1 tablet (325 mg total) by mouth every 4 (four) hours as needed for mild pain or fever 30 tablet 5    amLODIPine (NORVASC) 5 mg tablet Take 1 tablet (5 mg total) by mouth daily 30 tablet 9    ammonium lactate (LAC-HYDRIN) 12 % lotion Apply topically to affected area on skin twice daily as needed for eczema 225 g 5    Benzoyl Peroxide (benzoyl peroxide) 10 % LIQD Apply 1 application topically daily as needed (USE TO [facility] FACE/ SHOULDERS/BACK DAILY AS NEEDED (ACNE)*STOP USING IFEXCESSIVE IRRIT/RED) Use daily to wash face,shoulders and back as needed for acne 237 g 5    bismuth subsalicylate (PEPTO BISMOL) 524 mg/30 mL oral suspension Take 15 mL (262 mg total) by mouth every 6 (six) hours as needed for indigestion or diarrhea 360 mL 2    Camphor-Eucalyptus-Menthol (VICKS VAPORUB) 4 7-1 2-2 6 % OINT Apply topically 2 (two) times a day as needed (PRN) 50 g 11    cetirizine (ZyrTEC) 5 MG chewable tablet Chew 1 tablet (5 mg total) daily at bedtime as needed for allergies 30 tablet 0    COLACE 100 MG capsule TAKE 1 CAPSULE BY MOUTH 2 TIMES DAILY AT 8A-5P (CONSTIPATION) *HOLD FOR LOOSE STOOLS 180 capsule 3    dextromethorphan-guaiFENesin (ROBITUSSIN DM)  mg/5 mL syrup Take 5 mL by mouth 3 (three) times a day as needed for cough or congestion 118 mL 11    divalproex sodium (DEPAKOTE ER) 500 mg 24 hr tablet Take 3 tablets (1500 mg total) by mouth once daily at 5 pm 30 tablet 6    fluticasone (FLONASE) 50 mcg/act nasal spray 1 spray into each nostril daily 1 Bottle 0    gabapentin (NEURONTIN) 800 mg tablet Take 1 tablet (800 mg total) by mouth 3 times daily at 8 am, 4 pm, and 8 pm  0    ibuprofen (MOTRIN) 600 mg tablet Take 1 tablet (600 mg total) by mouth every 6 (six) hours as needed for mild pain, moderate pain, fever or headaches 30 tablet 11    L-Methylfolate-Algae (DEPLIN 15) 15-90 314 MG CAPS Take 15 mg by mouth daily Take 1 tablet by mouth once daily at 8AM      lanolin-mineral oil (BABY OIL) OIL Apply topically as needed for dry skin 1 Bottle 11    lithium carbonate (LITHOBID) 450 mg CR tablet Take 2 tablets (900 mg total) by mouth daily at 5:30 pm  0    LORazepam (ATIVAN) 0 5 mg tablet       OLANZapine (ZyPREXA) 20 MG tablet Take 0 5 tablet (10 mg) by mouth twice daily at 12 pm and 8 pm  0  omeprazole (PriLOSEC) 20 mg delayed release capsule TAKE 1 CAPSULE BY MOUTH ONCE DAILY AT 7AM (GERD) *TERRAZAS 90 capsule 3    pyrithione zinc (HEAD AND SHOULDERS) 1 % shampoo Apply topically daily as needed for dandruff 240 mL 5    SODIUM FLUORIDE, DENTAL RINSE, (LISTERINE SMART RINSE) 0 02 % SOLN Rinse mouth twice daily as needed 800 mL 11    Sulfacetamide Sodium, Acne, 10 % LOTN Apply topically 2 (two) times a day 118 mL 5    Sunscreens (SUNBLOCK SPF30) LOTN Apply every 2 hours up to five times daily 1 Bottle 5     No current facility-administered medications for this visit  Allergies:     No Known Allergies   Physical Exam:     /76 (BP Location: Left arm, Patient Position: Sitting, Cuff Size: Large)   Pulse 78   Temp 99 2 °F (37 3 °C) (Tympanic)   Resp 14   Ht 5' 10" (1 778 m)   Wt 99 4 kg (219 lb 3 2 oz)   BMI 31 45 kg/m²     Physical Exam   Constitutional: He appears well-developed and well-nourished  No distress  HENT:   Head: Normocephalic and atraumatic  Right Ear: External ear normal    Left Ear: External ear normal    Nose: Nose normal    Mouth/Throat: Oropharynx is clear and moist    Eyes: Pupils are equal, round, and reactive to light  Conjunctivae and EOM are normal    Neck: Normal range of motion  Neck supple  No thyroid mass present  Cardiovascular: Normal rate, regular rhythm, S1 normal and normal heart sounds  No murmur heard  Pulmonary/Chest: Effort normal and breath sounds normal  No respiratory distress  He has no wheezes  He has no rales  Abdominal: Soft  Bowel sounds are normal  He exhibits no distension and no mass  There is no hepatosplenomegaly  There is no tenderness  There is no rebound and no guarding  Musculoskeletal: Normal range of motion  He exhibits no edema or deformity  Lymphadenopathy:     He has no cervical adenopathy  Neurological: He is alert  Skin: Skin is warm and dry  Capillary refill takes less than 2 seconds     Psychiatric: His behavior is normal  His mood appears anxious  His speech is delayed  Cognition and memory are impaired         Eleni Brothers, JACINTO   [facility]

## 2020-07-08 NOTE — PATIENT INSTRUCTIONS
Wellness Visit for Adults   AMBULATORY CARE:   A wellness visit  is when you see your healthcare provider to get screened for health problems  You can also get advice on how to stay healthy  Write down your questions so you remember to ask them  Ask your healthcare provider how often you should have a wellness visit  What happens at a wellness visit:  Your healthcare provider will ask about your health, and your family history of health problems  This includes high blood pressure, heart disease, and cancer  He or she will ask if you have symptoms that concern you, if you smoke, and about your mood  You may also be asked about your intake of medicines, supplements, food, and alcohol  Any of the following may be done:  · Your weight  will be checked  Your height may also be checked so your body mass index (BMI) can be calculated  Your BMI shows if you are at a healthy weight  · Your blood pressure  and heart rate will be checked  Your temperature may also be checked  · Blood and urine tests  may be done  Blood tests may be done to check your cholesterol levels  Abnormal cholesterol levels increase your risk for heart disease and stroke  You may also need a blood or urine test to check for diabetes if you are at increased risk  Urine tests may be done to look for signs of an infection or kidney disease  · A physical exam  includes checking your heartbeat and lungs with a stethoscope  Your healthcare provider may also check your skin to look for sun damage  · Screening tests  may be recommended  A screening test is done to check for diseases that may not cause symptoms  The screening tests you may need depend on your age, gender, family history, and lifestyle habits  For example, colorectal screening may be recommended if you are 48years old or older  Screening tests you need if you are a woman:   · A Pap smear  is used to screen for cervical cancer   Pap smears are usually done every 3 to 5 years depending on your age  You may need them more often if you have had abnormal Pap smear test results in the past  Ask your healthcare provider how often you should have a Pap smear  · A mammogram  is an x-ray of your breasts to screen for breast cancer  Experts recommend mammograms every 2 years starting at age 48 years  You may need a mammogram at age 52 years or younger if you have an increased risk for breast cancer  Talk to your healthcare provider about when you should start having mammograms and how often you need them  Vaccines you may need:   · Get an influenza vaccine  every year  The influenza vaccine protects you from the flu  Several types of viruses cause the flu  The viruses change over time, so new vaccines are made each year  · Get a tetanus-diphtheria (Td) booster vaccine  every 10 years  This vaccine protects you against tetanus and diphtheria  Tetanus is a severe infection that may cause painful muscle spasms and lockjaw  Diphtheria is a severe bacterial infection that causes a thick covering in the back of your mouth and throat  · Get a human papillomavirus (HPV) vaccine  if you are female and aged 23 to 32 or male 23 to 24 and never received it  This vaccine protects you from HPV infection  HPV is the most common infection spread by sexual contact  HPV may also cause vaginal, penile, and anal cancers  · Get a pneumococcal vaccine  if you are aged 72 years or older  The pneumococcal vaccine is an injection given to protect you from pneumococcal disease  Pneumococcal disease is an infection caused by pneumococcal bacteria  The infection may cause pneumonia, meningitis, or an ear infection  · Get a shingles vaccine  if you are aged 61 or older, even if you have had shingles before  The shingles vaccine is an injection to protect you from the varicella-zoster virus  This is the same virus that causes chickenpox   Shingles is a painful rash that develops in people who had chickenpox or have been exposed to the virus  How to eat healthy:  My Plate is a model for planning healthy meals  It shows the types and amounts of foods that should go on your plate  Fruits and vegetables make up about half of your plate, and grains and protein make up the other half  A serving of dairy is included on the side of your plate  The amount of calories and serving sizes you need depends on your age, gender, weight, and height  Examples of healthy foods are listed below:  · Eat a variety of vegetables  such as dark green, red, and orange vegetables  You can also include canned vegetables low in sodium (salt) and frozen vegetables without added butter or sauces  · Eat a variety of fresh fruits , canned fruit in 100% juice, frozen fruit, and dried fruit  · Include whole grains  At least half of the grains you eat should be whole grains  Examples include whole-wheat bread, wheat pasta, brown rice, and whole-grain cereals such as oatmeal     · Eat a variety of protein foods such as seafood (fish and shellfish), lean meat, and poultry without skin (turkey and chicken)  Examples of lean meats include pork leg, shoulder, or tenderloin, and beef round, sirloin, tenderloin, and extra lean ground beef  Other protein foods include eggs and egg substitutes, beans, peas, soy products, nuts, and seeds  · Choose low-fat dairy products such as skim or 1% milk or low-fat yogurt, cheese, and cottage cheese  · Limit unhealthy fats  such as butter, hard margarine, and shortening  Exercise:  Exercise at least 30 minutes per day on most days of the week  Some examples of exercise include walking, biking, dancing, and swimming  You can also fit in more physical activity by taking the stairs instead of the elevator or parking farther away from stores  Include muscle strengthening activities 2 days each week  Regular exercise provides many health benefits   It helps you manage your weight, and decreases your risk for type 2 diabetes, heart disease, stroke, and high blood pressure  Exercise can also help improve your mood  Ask your healthcare provider about the best exercise plan for you  General health and safety guidelines:   · Do not smoke  Nicotine and other chemicals in cigarettes and cigars can cause lung damage  Ask your healthcare provider for information if you currently smoke and need help to quit  E-cigarettes or smokeless tobacco still contain nicotine  Talk to your healthcare provider before you use these products  · Limit alcohol  A drink of alcohol is 12 ounces of beer, 5 ounces of wine, or 1½ ounces of liquor  · Lose weight, if needed  Being overweight increases your risk of certain health conditions  These include heart disease, high blood pressure, type 2 diabetes, and certain types of cancer  · Protect your skin  Do not sunbathe or use tanning beds  Use sunscreen with a SPF 15 or higher  Apply sunscreen at least 15 minutes before you go outside  Reapply sunscreen every 2 hours  Wear protective clothing, hats, and sunglasses when you are outside  · Drive safely  Always wear your seatbelt  Make sure everyone in your car wears a seatbelt  A seatbelt can save your life if you are in an accident  Do not use your cell phone when you are driving  This could distract you and cause an accident  Pull over if you need to make a call or send a text message  · Practice safe sex  Use latex condoms if are sexually active and have more than one partner  Your healthcare provider may recommend screening tests for sexually transmitted infections (STIs)  · Wear helmets, lifejackets, and protective gear  Always wear a helmet when you ride a bike or motorcycle, go skiing, or play sports that could cause a head injury  Wear protective equipment when you play sports  Wear a lifejacket when you are on a boat or doing water sports    © 2017 [address] [employer] Information is for End User's use only and may not be sold, redistributed or otherwise used for commercial purposes  All illustrations and images included in CareNotes® are the copyrighted property of [employer] , [employer]  or [employer]  The above information is an  only  It is not intended as medical advice for individual conditions or treatments  Talk to your doctor, nurse or pharmacist before following any medical regimen to see if it is safe and effective for you  Obesity   AMBULATORY CARE:   Obesity  is when your body mass index (BMI) is greater than 30  Your healthcare provider will use your height and weight to measure your BMI  The risks of obesity include  many health problems, such as injuries or physical disability  You may need tests to check for the following:  · Diabetes     · High blood pressure or high cholesterol     · Heart disease     · Gallbladder or liver disease     · Cancer of the colon, breast, prostate, liver, or kidney     · Sleep apnea     · Arthritis or gout  Seek care immediately if:   · You have a severe headache, confusion, or difficulty speaking  · You have weakness on one side of your body  · You have chest pain, sweating, or shortness of breath  Contact your healthcare provider if:   · You have symptoms of gallbladder or liver disease, such as pain in your upper abdomen  · You have knee or hip pain and discomfort while walking  · You have symptoms of diabetes, such as intense hunger and thirst, and frequent urination  · You have symptoms of sleep apnea, such as snoring or daytime sleepiness  · You have questions or concerns about your condition or care  Treatment for obesity  focuses on helping you lose weight to improve your health  Even a small decrease in BMI can reduce the risk for many health problems  Your healthcare provider will help you set a weight-loss goal   · Lifestyle changes  are the first step in treating obesity   These include making healthy food choices and getting regular physical activity  Your healthcare provider may suggest a weight-loss program that involves coaching, education, and therapy  · Medicine  may help you lose weight when it is used with a healthy diet and physical activity  · Surgery  can help you lose weight if you are very obese and have other health problems  There are several types of weight-loss surgery  Ask your healthcare provider for more information  Be successful losing weight:   · Set small, realistic goals  An example of a small goal is to walk for 20 minutes 5 days a week  Anther goal is to lose 5% of your body weight  · Tell friends, family members, and coworkers about your goals  and ask for their support  Ask a friend to lose weight with you, or join a weight-loss support group  · Identify foods or triggers that may cause you to overeat , and find ways to avoid them  Remove tempting high-calorie foods from your home and workplace  Place a bowl of fresh fruit on your kitchen counter  If stress causes you to eat, then find other ways to cope with stress  · Keep a diary to track what you eat and drink  Also write down how many minutes of physical activity you do each day  Weigh yourself once a week and record it in your diary  Eating changes: You will need to eat 500 to 1,000 fewer calories each day than you currently eat to lose 1 to 2 pounds a week  The following changes will help you cut calories:  · Eat smaller portions  Use small plates, no larger than 9 inches in diameter  Fill your plate half full of fruits and vegetables  Measure your food using measuring cups until you know what a serving size looks like  · Eat 3 meals and 1 or 2 snacks each day  Plan your meals in advance  Sidney Leos and eat at home most of the time  Eat slowly  · Eat fruits and vegetables at every meal   They are low in calories and high in fiber, which makes you feel full   Do not add butter, margarine, or cream sauce to vegetables  Use herbs to season steamed vegetables  · Eat less fat and fewer fried foods  Eat more baked or grilled chicken and fish  These protein sources are lower in calories and fat than red meat  Limit fast food  Dress your salads with olive oil and vinegar instead of bottled dressing  · Limit the amount of sugar you eat  Do not drink sugary beverages  Limit alcohol  Activity changes:  Physical activity is good for your body in many ways  It helps you burn calories and build strong muscles  It decreases stress and depression, and improves your mood  It can also help you sleep better  Talk to your healthcare provider before you begin an exercise program   · Exercise for at least 30 minutes 5 days a week  Start slowly  Set aside time each day for physical activity that you enjoy and that is convenient for you  It is best to do both weight training and an activity that increases your heart rate, such as walking, bicycling, or swimming  · Find ways to be more active  Do yard work and housecleaning  Walk up the stairs instead of using elevators  Spend your leisure time going to events that require walking, such as outdoor festivals or fairs  This extra physical activity can help you lose weight and keep it off  Follow up with your healthcare provider as directed: You may need to meet with a dietitian  Write down your questions so you remember to ask them during your visits  © 2017 [address] [employer] Information is for End User's use only and may not be sold, redistributed or otherwise used for commercial purposes  All illustrations and images included in CareNotes® are the copyrighted property of [employer]  or [employer] Wilson  The above information is an  only  It is not intended as medical advice for individual conditions or treatments   Talk to your doctor, nurse or pharmacist before following any medical regimen to see if it is safe and effective for you

## 2020-08-03 ENCOUNTER — APPOINTMENT (EMERGENCY)
Dept: CT IMAGING | Facility: HOSPITAL | Age: 35
End: 2020-08-03
Payer: COMMERCIAL

## 2020-08-03 ENCOUNTER — HOSPITAL ENCOUNTER (EMERGENCY)
Facility: HOSPITAL | Age: 35
Discharge: HOME | End: 2020-08-03
Attending: EMERGENCY MEDICINE | Admitting: EMERGENCY MEDICINE
Payer: COMMERCIAL

## 2020-08-03 VITALS
SYSTOLIC BLOOD PRESSURE: 152 MMHG | WEIGHT: 221.34 LBS | HEART RATE: 104 BPM | DIASTOLIC BLOOD PRESSURE: 92 MMHG | BODY MASS INDEX: 31.76 KG/M2 | TEMPERATURE: 98 F | RESPIRATION RATE: 16 BRPM | OXYGEN SATURATION: 97 %

## 2020-08-03 DIAGNOSIS — S09.90XA INJURY OF HEAD, INITIAL ENCOUNTER: Primary | ICD-10-CM

## 2020-08-03 DIAGNOSIS — S00.83XA FOREHEAD CONTUSION, INITIAL ENCOUNTER: ICD-10-CM

## 2020-08-03 PROCEDURE — 99284 EMERGENCY DEPT VISIT MOD MDM: CPT | Performed by: EMERGENCY MEDICINE

## 2020-08-03 PROCEDURE — G1004 CDSM NDSC: HCPCS

## 2020-08-03 PROCEDURE — 70450 CT HEAD/BRAIN W/O DYE: CPT

## 2020-08-03 PROCEDURE — 99284 EMERGENCY DEPT VISIT MOD MDM: CPT

## 2020-08-03 RX ORDER — GINSENG 100 MG
1 CAPSULE ORAL ONCE
Status: COMPLETED | OUTPATIENT
Start: 2020-08-03 | End: 2020-08-03

## 2020-08-03 RX ADMIN — BACITRACIN 1 SMALL APPLICATION: 500 OINTMENT TOPICAL at 12:05

## 2020-08-03 NOTE — ED PROVIDER NOTES
History  Chief Complaint   Patient presents with    Head Injury     Pt w/ hx of MR lives in group home; per staff Pt had outburst last night and was hitting his head; no LOC; Pt sustained abrasion on forehead and per group home policy Pt must be evaluated in ED; Pt denies pain or vomiting     26-year-old male presents via ambulance from group home for evaluation frontal head injury that was sustained by self flexion of patient banging his head against the wall because he was upset  The patient of frontal head pain without nausea or vomiting  The patient denies any neurologic deficit  The patient denies any visual disturbance or neck pain  Prior to Admission Medications   Prescriptions Last Dose Informant Patient Reported? Taking?    Benzoyl Peroxide (benzoyl peroxide) 10 % LIQD  Care Giver No No   Sig: Apply 1 application topically daily as needed (USE TO [address] FACE/ SHOULDERS/BACK DAILY AS NEEDED (ACNE)*STOP USING IFEXCESSIVE IRRIT/RED) Use daily to wash face,shoulders and back as needed for acne   COLACE 100 MG capsule  Care Giver No No   Sig: TAKE 1 CAPSULE BY MOUTH 2 TIMES DAILY AT 8A-5P (CONSTIPATION) *HOLD FOR LOOSE STOOLS   Camphor-Eucalyptus-Menthol (VICKS VAPORUB) 4 7-1 2-2 6 % OINT  Care Giver No No   Sig: Apply topically 2 (two) times a day as needed (PRN)   L-Methylfolate-Algae (DEPLIN 15) 15-90 314 MG CAPS   Yes No   Sig: Take 15 mg by mouth daily Take 1 tablet by mouth once daily at 8AM   LORazepam (ATIVAN) 0 5 mg tablet  Care Giver Yes No   OLANZapine (ZyPREXA) 20 MG tablet  Care Giver No No   Sig: Take 0 5 tablet (10 mg) by mouth twice daily at 12 pm and 8 pm   SODIUM FLUORIDE, DENTAL RINSE, (LISTERINE SMART RINSE) 0 02 % SOLN  Care Giver No No   Sig: Rinse mouth twice daily as needed   Sulfacetamide Sodium, Acne, 10 % LOTN  Care Giver No No   Sig: Apply topically 2 (two) times a day   Sunscreens (SUNBLOCK SPF30) LOTN  Care Giver No No   Sig: Apply every 2 hours up to five times daily acetaminophen (TYLENOL) 325 mg tablet  Care Giver No No   Sig: Take 1 tablet (325 mg total) by mouth every 4 (four) hours as needed for mild pain or fever   amLODIPine (NORVASC) 5 mg tablet  Care Giver No No   Sig: Take 1 tablet (5 mg total) by mouth daily   ammonium lactate (LAC-HYDRIN) 12 % lotion  Care Giver No No   Sig: Apply topically to affected area on skin twice daily as needed for eczema   bismuth subsalicylate (PEPTO BISMOL) 524 mg/30 mL oral suspension  Care Giver No No   Sig: Take 15 mL (262 mg total) by mouth every 6 (six) hours as needed for indigestion or diarrhea   cetirizine (ZyrTEC) 5 MG chewable tablet  Care Giver No No   Sig: Chew 1 tablet (5 mg total) daily at bedtime as needed for allergies   dextromethorphan-guaiFENesin (ROBITUSSIN DM)  mg/5 mL syrup  Care Giver No No   Sig: Take 5 mL by mouth 3 (three) times a day as needed for cough or congestion   divalproex sodium (DEPAKOTE ER) 500 mg 24 hr tablet  Care Giver No No   Sig: Take 3 tablets (1500 mg total) by mouth once daily at 5 pm   fluticasone (FLONASE) 50 mcg/act nasal spray  Care Giver No No   Si spray into each nostril daily   gabapentin (NEURONTIN) 800 mg tablet  Care Giver No No   Sig: Take 1 tablet (800 mg total) by mouth 3 times daily at 8 am, 4 pm, and 8 pm   ibuprofen (MOTRIN) 600 mg tablet  Care Giver No No   Sig: Take 1 tablet (600 mg total) by mouth every 6 (six) hours as needed for mild pain, moderate pain, fever or headaches   lanolin-mineral oil (BABY OIL) OIL  Care Giver No No   Sig: Apply topically as needed for dry skin   lithium carbonate (LITHOBID) 450 mg CR tablet  Care Giver No No   Sig: Take 2 tablets (900 mg total) by mouth daily at 5:30 pm   omeprazole (PriLOSEC) 20 mg delayed release capsule  Care Giver No No   Sig: TAKE 1 CAPSULE BY MOUTH ONCE DAILY AT 7AM (GERD) *TERRAZAS   pyrithione zinc (HEAD AND SHOULDERS) 1 % shampoo  Care Giver No No   Sig: Apply topically daily as needed for dandruff Facility-Administered Medications: None       Past Medical History:   Diagnosis Date    ADHD (attention deficit hyperactivity disorder)     Atypical pervasive developmental disorder     Autism     Bipolar disorder ([facility] )     Constipation     Depression     Head-banging     Last Assessed:  9/1/17    Hydronephrosis 9/19/2019    Hyperlipidemia     Hypertension     Intermittent explosive disorder     Oppositional defiant disorder     Personality disorder ([facility] )     Schizophrenia ([facility] )     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[place] [address] )     Seizures ([facility] )     Sleep disorder     Vitamin D insufficiency     Last Assessed:  6/22/17       Past Surgical History:   Procedure Laterality Date    NO PAST SURGERIES      UMBILICAL HERNIA REPAIR         History reviewed  No pertinent family history  I have reviewed and agree with the history as documented  E-Cigarette/Vaping    E-Cigarette Use Never User      E-Cigarette/Vaping Substances    Nicotine No     THC No     CBD No     Flavoring No     Other No     Unknown No      Social History     Tobacco Use    Smoking status: Never Smoker    Smokeless tobacco: Never Used   Substance Use Topics    Alcohol use: No    Drug use: No       Review of Systems   Neurological: Positive for headaches  All other systems reviewed and are negative  Physical Exam  Physical Exam  Vitals signs and nursing note reviewed  Constitutional:       General: He is not in acute distress  Appearance: He is well-developed  HENT:      Head: Normocephalic  Abrasion and contusion present  No raccoon eyes or Pack's sign  Right Ear: External ear normal  No hemotympanum  Left Ear: External ear normal  No hemotympanum  Nose: No nasal deformity  Eyes:      General: Lids are normal       Conjunctiva/sclera: Conjunctivae normal       Pupils: Pupils are equal, round, and reactive to light  Neck:      Musculoskeletal: Normal range of motion        Trachea: No tracheal deviation  Cardiovascular:      Rate and Rhythm: Normal rate and regular rhythm  Heart sounds: Normal heart sounds  No murmur  Pulmonary:      Effort: Pulmonary effort is normal  No respiratory distress  Breath sounds: Normal breath sounds  Chest:      Chest wall: No tenderness  Abdominal:      General: There is no distension  Palpations: Abdomen is soft  Tenderness: There is no abdominal tenderness  There is no guarding or rebound  Musculoskeletal: Normal range of motion  General: No tenderness  Skin:     General: Skin is warm and dry  Neurological:      Mental Status: He is alert and oriented to person, place, and time  Deep Tendon Reflexes: Reflexes are normal and symmetric  Vital Signs  ED Triage Vitals [08/03/20 1109]   Temperature Pulse Respirations Blood Pressure SpO2   98 °F (36 7 °C) 104 16 152/92 97 %      Temp Source Heart Rate Source Patient Position - Orthostatic VS BP Location FiO2 (%)   Oral Monitor -- -- --      Pain Score       No Pain           Vitals:    08/03/20 1109   BP: 152/92   Pulse: 104         Visual Acuity      ED Medications  Medications   bacitracin topical ointment 1 small application (1 small application Topical Given 8/3/20 1205)       Diagnostic Studies  Results Reviewed     None                 CT head without contrast   Final Result by Emely Khanna MD (08/03 1150)      No acute intracranial abnormality  Workstation performed: [id number]                    Procedures  Procedures         ED Course       US AUDIT      Most Recent Value   Initial Alcohol Screen: US AUDIT-C    1  How often do you have a drink containing alcohol?  0 Filed at: 08/03/2020 1110   2  How many drinks containing alcohol do you have on a typical day you are drinking? 0 Filed at: 08/03/2020 1110   3a  Male UNDER 65: How often do you have five or more drinks on one occasion?   0 Filed at: 08/03/2020 1110   Audit-C Score  0 Filed at: 08/03/2020 1110                  ELPIDIO/DAST-10      Most Recent Value   How many times in the past year have you    Used an illegal drug or used a prescription medication for non-medical reasons? Never Filed at: 08/03/2020 1110                                [facility]  Number of Diagnoses or Management Options  Forehead contusion, initial encounter: new and requires workup  Injury of head, initial encounter: new and requires workup  Diagnosis management comments: Plan is to obtain a CT the head to rule out clinically significant head injury such as skull fracture, subdural or epidural hematoma as well as cerebral contusion  Amount and/or Complexity of Data Reviewed  Tests in the radiology section of CPT®: reviewed  Independent visualization of images, tracings, or specimens: yes          Disposition  Final diagnoses:   Injury of head, initial encounter   Forehead contusion, initial encounter     Time reflects when diagnosis was documented in both MDM as applicable and the Disposition within this note     Time User Action Codes Description Comment    8/3/2020 11:57 AM Laz Zamora Add [S09 90XA] Injury of head, initial encounter     8/3/2020 11:57 AM Laz Zamora Add [S00 83XA] Forehead contusion, initial encounter       ED Disposition     ED Disposition Condition Date/Time Comment    Discharge Stable Mon Aug 3, 2020 11:57 AM Lulu Snowball discharge to home/self care  Follow-up Information     Follow up With Specialties Details Why [address] [facility], [facility], Physician Assistant  As needed, For further evaluation [place] [id number] [address] [facility]  [phone]            Patient's Medications   Discharge Prescriptions    No medications on file     No discharge procedures on file      PDMP Review     None          ED Provider  Electronically Signed by           Ramez Munoz DO  08/03/20 2401

## 2020-08-05 ENCOUNTER — TELEPHONE (OUTPATIENT)
Dept: FAMILY MEDICINE CLINIC | Facility: CLINIC | Age: 35
End: 2020-08-05

## 2020-08-05 DIAGNOSIS — S00.83XA CONTUSION OF FOREHEAD, INITIAL ENCOUNTER: Primary | ICD-10-CM

## 2020-08-05 RX ORDER — BACITRACIN, NEOMYCIN, POLYMYXIN B 400; 3.5; 5 [USP'U]/G; MG/G; [USP'U]/G
OINTMENT TOPICAL 2 TIMES DAILY PRN
Status: DISCONTINUED | OUTPATIENT
Start: 2020-08-05 | End: 2020-10-07

## 2020-08-05 NOTE — TELEPHONE ENCOUNTER
Patient seen at [facility] on Mon 8/3 for head injury  Caregiver requesting order for Neosporin PRN to be sent to [facility]   He has been picking scab

## 2020-08-05 NOTE — TELEPHONE ENCOUNTER
Reviewed patient's ER, had sustained forehead contusion from head banging  Entered PRN order for sign off if you approve

## 2020-08-12 ENCOUNTER — OFFICE VISIT (OUTPATIENT)
Dept: FAMILY MEDICINE CLINIC | Facility: CLINIC | Age: 35
End: 2020-08-12

## 2020-08-12 VITALS
TEMPERATURE: 98.3 F | DIASTOLIC BLOOD PRESSURE: 90 MMHG | SYSTOLIC BLOOD PRESSURE: 140 MMHG | WEIGHT: 223.4 LBS | HEART RATE: 86 BPM | RESPIRATION RATE: 18 BRPM | HEIGHT: 70 IN | BODY MASS INDEX: 31.98 KG/M2

## 2020-08-12 DIAGNOSIS — T14.8XXA ABRASION: Primary | ICD-10-CM

## 2020-08-12 PROCEDURE — 1036F TOBACCO NON-USER: CPT | Performed by: PHYSICIAN ASSISTANT

## 2020-08-12 PROCEDURE — 3080F DIAST BP >= 90 MM HG: CPT | Performed by: PHYSICIAN ASSISTANT

## 2020-08-12 PROCEDURE — 3077F SYST BP >= 140 MM HG: CPT | Performed by: PHYSICIAN ASSISTANT

## 2020-08-12 PROCEDURE — 3008F BODY MASS INDEX DOCD: CPT | Performed by: PHYSICIAN ASSISTANT

## 2020-08-12 PROCEDURE — 99213 OFFICE O/P EST LOW 20 MIN: CPT | Performed by: PHYSICIAN ASSISTANT

## 2020-08-12 NOTE — PROGRESS NOTES
Assessment/Plan:    Abrasion  Scalp abrasion- area scabbed over  May use Neosporin PRN  Avoid touching or picky at area  Advised caregivers to try and keep his nails short  Body checks at change of every shift with both staff members present      Diagnoses and all orders for this visit:    Abrasion    Other orders  -     Cancel: HIV 1/2 Antigen/Antibody (4th Generation) w Reflex [facility]; Future        Subjective:      Patient ID: Imelda Parks is a 30 y o  male presents today with caregivers for ED f/u     HPI   Patient was seen at [facility] ED on 8/3/2020 for forehead injury  It was reported he had an outburst the night before and intentionally hit his head  He sustained abrasion on his forehead and per group home policy an evaluation was required  CT scan head IMPRESSION: No acute intracranial abnormality  D/ c home w/o any new orders  Caregivers reports a different staff member arrived that morning, saw blood on his forehead and assumed pt had hit his head  They reports abrasion on forehead/frontal scalp is old  They state, it's an area on his scalp which he tends to pick at constantly  Staff applies Neosporin PRN  Caregivers would like to start body checks at change of every shift  The following portions of the patient's history were reviewed and updated as appropriate: allergies, current medications, past family history, past medical history, past social history, past surgical history and problem list     Review of Systems   Constitutional: Negative for fatigue and fever  Respiratory: Negative for cough, shortness of breath and wheezing  Cardiovascular: Negative for chest pain  Gastrointestinal: Negative for nausea and vomiting  Skin: Positive for wound  Psychiatric/Behavioral: Negative for agitation and behavioral problems           Objective:      /90 (BP Location: Left arm, Patient Position: Sitting, Cuff Size: Large)   Pulse 86   Temp 98 3 °F (36 8 °C) (Tympanic)   Resp 18 Ht 5' 10" (1 778 m)   Wt 101 kg (223 lb 6 4 oz)   BMI 32 05 kg/m²          Physical Exam  Constitutional:       General: He is not in acute distress  Appearance: He is not ill-appearing  HENT:      Head: Atraumatic  Neck:      Musculoskeletal: Normal range of motion and neck supple  Cardiovascular:      Rate and Rhythm: Normal rate and regular rhythm  Heart sounds: No murmur  Pulmonary:      Effort: Pulmonary effort is normal  No respiratory distress  Breath sounds: Normal breath sounds  No wheezing  Skin:         Neurological:      Mental Status: He is alert     Psychiatric:         Mood and Affect: Mood normal          Behavior: Behavior normal

## 2020-08-12 NOTE — ASSESSMENT & PLAN NOTE
Scalp abrasion- area scabbed over  May use Neosporin PRN  Avoid touching or picky at area  Advised caregivers to try and keep his nails short  Body checks at change of every shift with both staff members present

## 2020-08-14 DIAGNOSIS — K08.9 POOR DENTITION: ICD-10-CM

## 2020-08-17 DIAGNOSIS — K21.9 GASTROESOPHAGEAL REFLUX DISEASE, ESOPHAGITIS PRESENCE NOT SPECIFIED: ICD-10-CM

## 2020-08-17 DIAGNOSIS — L21.0 DANDRUFF: ICD-10-CM

## 2020-08-17 DIAGNOSIS — R21 RASH: ICD-10-CM

## 2020-08-17 DIAGNOSIS — K59.00 CONSTIPATION: ICD-10-CM

## 2020-08-17 DIAGNOSIS — R09.81 MILD NASAL CONGESTION: ICD-10-CM

## 2020-08-18 RX ORDER — IBUPROFEN 600 MG/1
600 TABLET ORAL EVERY 6 HOURS PRN
Qty: 30 TABLET | Refills: 5 | Status: SHIPPED | OUTPATIENT
Start: 2020-08-18 | End: 2021-10-12 | Stop reason: SDUPTHER

## 2020-08-18 RX ORDER — DOCUSATE SODIUM 100 MG/1
CAPSULE ORAL
Qty: 180 CAPSULE | Refills: 3 | Status: SHIPPED | OUTPATIENT
Start: 2020-08-18 | End: 2021-07-01

## 2020-08-18 RX ORDER — GUAIFENESIN/DEXTROMETHORPHAN 100-10MG/5
5 SYRUP ORAL 3 TIMES DAILY PRN
Qty: 118 ML | Refills: 5 | Status: SHIPPED | OUTPATIENT
Start: 2020-08-18 | End: 2020-09-16 | Stop reason: SDUPTHER

## 2020-08-18 RX ORDER — OMEPRAZOLE 20 MG/1
20 CAPSULE, DELAYED RELEASE ORAL DAILY
Qty: 90 CAPSULE | Refills: 1 | Status: SHIPPED | OUTPATIENT
Start: 2020-08-18 | End: 2021-07-01

## 2020-08-18 RX ORDER — L-DESOXYEPHEDRINE 50 MG
INHALER (EA) NASAL 2 TIMES DAILY PRN
Qty: 50 G | Refills: 5 | Status: SHIPPED | OUTPATIENT
Start: 2020-08-18 | End: 2020-09-16 | Stop reason: SDUPTHER

## 2020-09-03 DIAGNOSIS — I10 ESSENTIAL HYPERTENSION: ICD-10-CM

## 2020-09-03 RX ORDER — AMLODIPINE BESYLATE 5 MG/1
TABLET ORAL
Qty: 30 TABLET | Refills: 0 | Status: SHIPPED | OUTPATIENT
Start: 2020-09-03 | End: 2020-11-24

## 2020-09-16 ENCOUNTER — TELEPHONE (OUTPATIENT)
Dept: FAMILY MEDICINE CLINIC | Facility: CLINIC | Age: 35
End: 2020-09-16

## 2020-09-16 ENCOUNTER — OFFICE VISIT (OUTPATIENT)
Dept: FAMILY MEDICINE CLINIC | Facility: CLINIC | Age: 35
End: 2020-09-16

## 2020-09-16 VITALS
BODY MASS INDEX: 32.33 KG/M2 | RESPIRATION RATE: 16 BRPM | HEART RATE: 82 BPM | HEIGHT: 70 IN | TEMPERATURE: 98.5 F | SYSTOLIC BLOOD PRESSURE: 110 MMHG | WEIGHT: 225.8 LBS | DIASTOLIC BLOOD PRESSURE: 90 MMHG

## 2020-09-16 DIAGNOSIS — R21 RASH: ICD-10-CM

## 2020-09-16 DIAGNOSIS — T14.8XXA ABRASION: Primary | ICD-10-CM

## 2020-09-16 DIAGNOSIS — R09.81 MILD NASAL CONGESTION: ICD-10-CM

## 2020-09-16 DIAGNOSIS — Z78.9 INFLUENZA VACCINATION ORDERED: ICD-10-CM

## 2020-09-16 DIAGNOSIS — J02.9 SORE THROAT: ICD-10-CM

## 2020-09-16 PROCEDURE — 99213 OFFICE O/P EST LOW 20 MIN: CPT | Performed by: FAMILY MEDICINE

## 2020-09-16 PROCEDURE — 90686 IIV4 VACC NO PRSV 0.5 ML IM: CPT | Performed by: FAMILY MEDICINE

## 2020-09-16 PROCEDURE — 3008F BODY MASS INDEX DOCD: CPT | Performed by: PHYSICIAN ASSISTANT

## 2020-09-16 PROCEDURE — 90471 IMMUNIZATION ADMIN: CPT | Performed by: FAMILY MEDICINE

## 2020-09-16 RX ORDER — GUAIFENESIN/DEXTROMETHORPHAN 100-10MG/5
5 SYRUP ORAL 3 TIMES DAILY PRN
Qty: 118 ML | Refills: 5 | Status: SHIPPED | OUTPATIENT
Start: 2020-09-16 | End: 2021-10-12 | Stop reason: SDUPTHER

## 2020-09-16 RX ORDER — L-DESOXYEPHEDRINE 50 MG
INHALER (EA) NASAL 2 TIMES DAILY PRN
Qty: 50 G | Refills: 5 | Status: SHIPPED | OUTPATIENT
Start: 2020-09-16 | End: 2021-10-12 | Stop reason: SDUPTHER

## 2020-09-16 NOTE — PROGRESS NOTES
Assessment/Plan:      1  Abrasion     2  Influenza vaccination ordered  influenza vaccine, quadrivalent, 0 5 mL, preservative-free, for adult and pediatric patients 6 mos+ (AFLURIA, FLUARIX, FLULAVAL, FLUZONE)   3  Mild nasal congestion  Camphor-Eucalyptus-Menthol (Vicks VapoRub) 4 7-1 2-2 6 % OINT    dextromethorphan-guaiFENesin (ROBITUSSIN DM)  mg/5 mL syrup   4  Rash  lanolin-mineral oil (BABY OIL) OIL   5  Sore throat  phenol (CHLORASEPTIC) 1 4 % mucosal liquid      Rachael Schultz is a pleasant 80-year-old male here today for follow-up of his abrasions  Small 1 in horizontal-on forehead right side well healing and scabbed  No signs of infection  Small 1 in round abrasion on superior forehead, showing signs of healing no signs of infection  Advised group home staff to apply bacitracin to both wounds in the interim and advised patient to no longer bang his head against the walls when he gets upset  Med refills and flu vaccine given today  Call with any questions or concerns  _________________________________________________________  Subjective:    Patient ID: Imelda Parks is a 30 y o  male  HPI  Imelda Parks  Is a 80-year-old male with mental   Impairment, intermittent explosive disorder, oppositional defiant disorder, and schizophrenia, who lives in a group home and reported to the emergency department on 8/3/20 after hitting his head and sustaining an abrasion on his forehead  His group home denied loss of consciousness  He was upset that time and started banging his head against the wall  Head CT was performed in the emergency department and no acute intracranial abnormality was reported  He had received bacitracin and followed up in our office on 08/12/20  Today they report he has a new 1 in wound on his forehead from banging his head and the emergency squad was called but ultimately he was not brought to the hospital as it was minor  Here today to follow-up        Annual flu vaccine desired  The following portions of the patient's history were reviewed and updated as appropriate: allergies, current medications and problem list     Review of Systems   Constitutional: Negative for chills and fever  HENT: Negative for congestion and sore throat  Respiratory: Negative for cough and shortness of breath  Cardiovascular: Negative for chest pain and leg swelling  Gastrointestinal: Negative for diarrhea, nausea and vomiting  Musculoskeletal: Negative for back pain and neck pain  Skin: Positive for wound  Negative for color change  Neurological: Negative for dizziness and headaches  Psychiatric/Behavioral: Negative for dysphoric mood  The patient is hyperactive  Objective:  Vitals:    09/16/20 1046   BP: 110/90   Pulse: 82   Resp: 16   Temp: 98 5 °F (36 9 °C)      Physical Exam  Vitals signs reviewed  Constitutional:       General: He is not in acute distress  Appearance: He is well-developed  He is obese  He is not ill-appearing  HENT:      Head: Normocephalic and atraumatic  Right Ear: Tympanic membrane and ear canal normal  There is no impacted cerumen  Left Ear: Tympanic membrane and ear canal normal  There is no impacted cerumen  Nose: Nose normal  No congestion or rhinorrhea  Mouth/Throat:      Mouth: Mucous membranes are moist       Pharynx: Oropharynx is clear  No oropharyngeal exudate or posterior oropharyngeal erythema  Comments:  Poor dentition  Eyes:      General: No scleral icterus  Right eye: No discharge  Left eye: No discharge  Neck:      Musculoskeletal: Normal range of motion and neck supple  No neck rigidity or muscular tenderness  Cardiovascular:      Rate and Rhythm: Normal rate and regular rhythm  Pulses: Normal pulses  Heart sounds: Normal heart sounds  No murmur  No friction rub  No gallop  Pulmonary:      Effort: Pulmonary effort is normal  No respiratory distress        Breath sounds: Normal breath sounds  No stridor  No wheezing or rhonchi  Lymphadenopathy:      Cervical: No cervical adenopathy  Skin:     General: Skin is warm and dry  Comments:   1 in horizontal wound on right forehead, well healing with scab  No bleeding, erythema or signs of infection  1 in round abrasion on superior forehead midline without signs of infection, erythema or bleeding  Neurological:      Mental Status: He is alert  Psychiatric:      Comments:   Rocking back and forth on table  Pleasant,  Difficult to understand speech  Portions of the record may have been created with voice recognition software  Occasional wrong word or "sound alike" substitutions may have occurred due to the inherent limitations of voice recognition software  Please review the chart carefully and recognize, using context, where substitutions/typographical errors may have occurred

## 2020-10-07 ENCOUNTER — OFFICE VISIT (OUTPATIENT)
Dept: FAMILY MEDICINE CLINIC | Facility: CLINIC | Age: 35
End: 2020-10-07

## 2020-10-07 VITALS
DIASTOLIC BLOOD PRESSURE: 80 MMHG | HEART RATE: 86 BPM | TEMPERATURE: 98.5 F | WEIGHT: 225.8 LBS | SYSTOLIC BLOOD PRESSURE: 110 MMHG | OXYGEN SATURATION: 98 % | BODY MASS INDEX: 32.4 KG/M2

## 2020-10-07 DIAGNOSIS — I10 BENIGN ESSENTIAL HYPERTENSION: ICD-10-CM

## 2020-10-07 DIAGNOSIS — F20.9 SCHIZOPHRENIA, UNSPECIFIED TYPE (HCC): ICD-10-CM

## 2020-10-07 DIAGNOSIS — F31.9 BIPOLAR 1 DISORDER (HCC): ICD-10-CM

## 2020-10-07 DIAGNOSIS — K08.9 POOR DENTITION: ICD-10-CM

## 2020-10-07 DIAGNOSIS — R26.9 ABNORMAL GAIT: ICD-10-CM

## 2020-10-07 DIAGNOSIS — G47.00 INSOMNIA: ICD-10-CM

## 2020-10-07 DIAGNOSIS — T14.8XXA ABRASION: Primary | ICD-10-CM

## 2020-10-07 PROBLEM — R10.9 ABDOMINAL PAIN: Status: RESOLVED | Noted: 2019-02-01 | Resolved: 2020-10-07

## 2020-10-07 PROCEDURE — 99214 OFFICE O/P EST MOD 30 MIN: CPT | Performed by: PHYSICIAN ASSISTANT

## 2020-10-07 RX ORDER — SODIUM FLUORIDE 6 MG/ML
PASTE, DENTIFRICE DENTAL
Refills: 0
Start: 2020-10-07 | End: 2021-05-05 | Stop reason: SDUPTHER

## 2020-10-07 RX ORDER — OLANZAPINE 20 MG/1
TABLET ORAL
Refills: 0
Start: 2020-10-07

## 2020-10-07 RX ORDER — BACITRACIN, NEOMYCIN, POLYMYXIN B 400; 3.5; 5 [USP'U]/G; MG/G; [USP'U]/G
OINTMENT TOPICAL
Qty: 15 G | Refills: 5 | Status: SHIPPED | OUTPATIENT
Start: 2020-10-07 | End: 2021-10-12 | Stop reason: SDUPTHER

## 2020-10-09 ENCOUNTER — APPOINTMENT (OUTPATIENT)
Dept: LAB | Facility: CLINIC | Age: 35
End: 2020-10-09
Payer: COMMERCIAL

## 2020-10-09 DIAGNOSIS — Z00.00 ANNUAL PHYSICAL EXAM: ICD-10-CM

## 2020-10-09 DIAGNOSIS — R73.09 ELEVATED GLUCOSE LEVEL: ICD-10-CM

## 2020-10-09 LAB
ALBUMIN SERPL BCP-MCNC: 3.8 G/DL (ref 3.5–5)
ALP SERPL-CCNC: 48 U/L (ref 46–116)
ALT SERPL W P-5'-P-CCNC: 37 U/L (ref 12–78)
ANION GAP SERPL CALCULATED.3IONS-SCNC: 3 MMOL/L (ref 4–13)
AST SERPL W P-5'-P-CCNC: 17 U/L (ref 5–45)
BILIRUB SERPL-MCNC: 0.26 MG/DL (ref 0.2–1)
BUN SERPL-MCNC: 19 MG/DL (ref 5–25)
CALCIUM SERPL-MCNC: 10.2 MG/DL (ref 8.3–10.1)
CHLORIDE SERPL-SCNC: 109 MMOL/L (ref 100–108)
CHOLEST SERPL-MCNC: 230 MG/DL (ref 50–200)
CO2 SERPL-SCNC: 30 MMOL/L (ref 21–32)
CREAT SERPL-MCNC: 1.03 MG/DL (ref 0.6–1.3)
EST. AVERAGE GLUCOSE BLD GHB EST-MCNC: 88 MG/DL
GFR SERPL CREATININE-BSD FRML MDRD: 94 ML/MIN/1.73SQ M
GLUCOSE P FAST SERPL-MCNC: 84 MG/DL (ref 65–99)
HBA1C MFR BLD: 4.7 %
HDLC SERPL-MCNC: 45 MG/DL
LDLC SERPL CALC-MCNC: 147 MG/DL (ref 0–100)
NONHDLC SERPL-MCNC: 185 MG/DL
POTASSIUM SERPL-SCNC: 4.3 MMOL/L (ref 3.5–5.3)
PROT SERPL-MCNC: 8.3 G/DL (ref 6.4–8.2)
SODIUM SERPL-SCNC: 142 MMOL/L (ref 136–145)
TRIGL SERPL-MCNC: 192 MG/DL

## 2020-10-09 PROCEDURE — 36415 COLL VENOUS BLD VENIPUNCTURE: CPT

## 2020-10-09 PROCEDURE — 80061 LIPID PANEL: CPT

## 2020-10-09 PROCEDURE — 83036 HEMOGLOBIN GLYCOSYLATED A1C: CPT

## 2020-10-09 PROCEDURE — 80053 COMPREHEN METABOLIC PANEL: CPT

## 2020-10-15 ENCOUNTER — TELEPHONE (OUTPATIENT)
Dept: FAMILY MEDICINE CLINIC | Facility: CLINIC | Age: 35
End: 2020-10-15

## 2020-10-27 DIAGNOSIS — L70.0 ACNE VULGARIS: ICD-10-CM

## 2020-10-28 RX ORDER — BENZOYL PEROXIDE 100 MG/ML
LIQUID TOPICAL
Qty: 237 G | Refills: 0 | Status: SHIPPED | OUTPATIENT
Start: 2020-10-28 | End: 2021-06-10 | Stop reason: SDUPTHER

## 2020-11-11 ENCOUNTER — TELEPHONE (OUTPATIENT)
Dept: FAMILY MEDICINE CLINIC | Facility: CLINIC | Age: 35
End: 2020-11-11

## 2020-11-13 DIAGNOSIS — J30.9 ALLERGIC CONJUNCTIVITIS OF BOTH EYES AND RHINITIS: ICD-10-CM

## 2020-11-13 DIAGNOSIS — H10.13 ALLERGIC CONJUNCTIVITIS OF BOTH EYES AND RHINITIS: ICD-10-CM

## 2020-11-17 RX ORDER — FLUTICASONE PROPIONATE 50 MCG
1 SPRAY, SUSPENSION (ML) NASAL DAILY
Qty: 1 BOTTLE | Refills: 5 | Status: SHIPPED | OUTPATIENT
Start: 2020-11-17 | End: 2022-01-24 | Stop reason: SDUPTHER

## 2020-11-23 DIAGNOSIS — I10 ESSENTIAL HYPERTENSION: ICD-10-CM

## 2020-11-24 DIAGNOSIS — I10 ESSENTIAL HYPERTENSION: ICD-10-CM

## 2020-11-24 RX ORDER — AMLODIPINE BESYLATE 5 MG/1
TABLET ORAL
Qty: 30 TABLET | Refills: 11 | Status: SHIPPED | OUTPATIENT
Start: 2020-11-24 | End: 2021-11-11

## 2020-11-24 RX ORDER — AMLODIPINE BESYLATE 5 MG/1
5 TABLET ORAL DAILY
Qty: 30 TABLET | Refills: 5 | OUTPATIENT
Start: 2020-11-24

## 2021-02-10 ENCOUNTER — TELEMEDICINE (OUTPATIENT)
Dept: FAMILY MEDICINE CLINIC | Facility: CLINIC | Age: 36
End: 2021-02-10

## 2021-02-10 DIAGNOSIS — S61.512D LACERATION OF LEFT WRIST, SUBSEQUENT ENCOUNTER: ICD-10-CM

## 2021-02-10 DIAGNOSIS — T14.8XXA ABRASION: Primary | ICD-10-CM

## 2021-02-10 PROCEDURE — 99213 OFFICE O/P EST LOW 20 MIN: CPT | Performed by: PHYSICIAN ASSISTANT
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
## 2021-02-11 ENCOUNTER — TELEPHONE (OUTPATIENT)
Dept: FAMILY MEDICINE CLINIC | Facility: CLINIC | Age: 36
End: 2021-02-11

## 2021-02-11 DIAGNOSIS — S61.512D LACERATION OF LEFT WRIST, SUBSEQUENT ENCOUNTER: Primary | ICD-10-CM

## 2021-02-11 PROBLEM — S61.512A LACERATION OF LEFT WRIST: Status: ACTIVE | Noted: 2021-02-11

## 2021-02-11 NOTE — ASSESSMENT & PLAN NOTE
Wound check today by caregiver  Advised to contact office if foul odor is present, purulent drainage, warmth, or erythema at site  After wound check advise to re-dressed wound ensure dressing is not too tight and pt is able to move his fingers  Encouraged to schedule follow up appointment with hand surgery ASAP  Continue Tylenol as directed  Monitor for any signs or symptoms of infection

## 2021-02-11 NOTE — TELEPHONE ENCOUNTER
Received message from Dahlia Cantrell requesting   To   Referral for [facility] plastic surgery  Scheduled 2/19/21 for Suture Removal     Please fax order to [phone]   Thank you!
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
## 2021-02-22 DIAGNOSIS — F98.4 HEAD-BANGING: ICD-10-CM

## 2021-02-23 RX ORDER — ACETAMINOPHEN 325 MG/1
325 TABLET ORAL EVERY 4 HOURS PRN
Qty: 30 TABLET | Refills: 5 | Status: SHIPPED | OUTPATIENT
Start: 2021-02-23 | End: 2022-03-30 | Stop reason: SDUPTHER

## 2021-03-02 DIAGNOSIS — L30.9 DERMATITIS: ICD-10-CM

## 2021-03-02 RX ORDER — AMMONIUM LACTATE 12 G/100G
LOTION TOPICAL
Qty: 225 G | Refills: 5 | Status: SHIPPED | OUTPATIENT
Start: 2021-03-02 | End: 2021-07-13 | Stop reason: SDUPTHER

## 2021-05-05 DIAGNOSIS — J30.9 ALLERGIC CONJUNCTIVITIS OF BOTH EYES AND RHINITIS: ICD-10-CM

## 2021-05-05 DIAGNOSIS — K08.9 POOR DENTITION: ICD-10-CM

## 2021-05-05 DIAGNOSIS — H10.13 ALLERGIC CONJUNCTIVITIS OF BOTH EYES AND RHINITIS: ICD-10-CM

## 2021-05-05 RX ORDER — SODIUM FLUORIDE 6 MG/ML
PASTE, DENTIFRICE DENTAL
Qty: 100 ML | Refills: 5 | Status: SHIPPED | OUTPATIENT
Start: 2021-05-05 | End: 2022-07-19

## 2021-05-05 RX ORDER — CETIRIZINE HYDROCHLORIDE 5 MG/1
5 TABLET, CHEWABLE ORAL
Qty: 30 TABLET | Refills: 5 | Status: SHIPPED | OUTPATIENT
Start: 2021-05-05 | End: 2022-03-30 | Stop reason: SDUPTHER

## 2021-06-10 DIAGNOSIS — K08.9 POOR DENTITION: ICD-10-CM

## 2021-06-10 DIAGNOSIS — L70.0 ACNE VULGARIS: ICD-10-CM

## 2021-06-11 ENCOUNTER — OFFICE VISIT (OUTPATIENT)
Dept: URGENT CARE | Facility: MEDICAL CENTER | Age: 36
End: 2021-06-11
Payer: COMMERCIAL

## 2021-06-11 VITALS
HEART RATE: 121 BPM | RESPIRATION RATE: 24 BRPM | OXYGEN SATURATION: 94 % | BODY MASS INDEX: 31.5 KG/M2 | WEIGHT: 220 LBS | TEMPERATURE: 99.8 F | HEIGHT: 70 IN

## 2021-06-11 DIAGNOSIS — J30.2 SEASONAL ALLERGIC RHINITIS, UNSPECIFIED TRIGGER: Primary | ICD-10-CM

## 2021-06-11 PROCEDURE — 99203 OFFICE O/P NEW LOW 30 MIN: CPT | Performed by: PHYSICIAN ASSISTANT

## 2021-06-11 RX ORDER — BENZOYL PEROXIDE 100 MG/ML
3 LIQUID TOPICAL DAILY
Qty: 237 G | Refills: 5 | Status: SHIPPED | OUTPATIENT
Start: 2021-06-11 | End: 2021-06-15

## 2021-06-11 NOTE — PATIENT INSTRUCTIONS
continue to use Zyrtec as directed   start using nasal spray as directed   Motrin and/or Tylenol as needed for fever  Follow up with PCP in 3-5 days  Proceed to  ER if symptoms worsen  Allergic Rhinitis   AMBULATORY CARE:   Allergic rhinitis , or hay fever, is swelling of the inside of your nose  The swelling is a reaction to allergens in the air  An allergen can be anything that causes an allergic reaction  Allergies to weeds, grass, trees, or mold often cause seasonal allergic rhinitis  Indoor dust mites, cockroaches, pet dander, or mold can also cause allergic rhinitis  Common signs and symptoms include the following:   · Sneezing    · Nasal congestion    · Runny nose    · Itchy nose, eyes, or mouth    · Red, watery eyes    · Postnasal drip (nasal drainage down the back of your throat)    · Cough or frequent throat clearing    · Feeling tired or lethargic    · Dark circles under your eyes    Call 911 for the following:   · You have chest pain or shortness of breath  Seek care immediately if:   · You have severe pain  · You cough up blood  Contact your healthcare provider if:   · You have a fever  · You have ear or sinus pain, or a headache  · Your symptoms get worse, even after treatment  · You have yellow, green, brown, or bloody mucus coming from your nose  · Your nose is bleeding or you have pain inside your nose  · You have trouble sleeping because of your symptoms  · You have questions or concerns about your condition or care  Treatment:   · Antihistamines  help reduce itching, sneezing, and a runny nose  Some antihistamines can make you sleepy  · Nasal steroids  help decrease inflammation in your nose  · Decongestants  help clear your stuffy nose  · Immunotherapy  may be needed if your symptoms are severe or other treatments do not work  Immunotherapy is used to inject an allergen into your skin   At first, the therapy contains tiny amounts of the allergen  Your healthcare provider will slowly increase the amount of allergen  This may help your body be less sensitive to the allergen and stop reacting to it  You may need immunotherapy for weeks or longer  Manage allergic rhinitis:  The best way to manage allergic rhinitis is to avoid allergens that can trigger your symptoms  Any of the following may help decrease your symptoms:  · Rinse your nose and sinuses  with a salt water solution or use a salt water nasal spray  This will help thin the mucus in your nose and rinse away pollen and dirt  It will also help reduce swelling so you can breathe normally  Ask your healthcare provider how often to rinse your nose  · Reduce exposure to dust mites  Wash sheets and towels in hot water every week  Cover your pillows and mattresses with allergen-free covers  Limit the number of stuffed animals and soft toys your child has  Wash your child's toys in hot water regularly  Vacuum weekly and use a vacuum  with an air filter  If possible, get rid of carpets and curtains  These collect dust and dust mites  · Reduce exposure to pollen  Keep windows and doors closed in your house and car  Stay inside when air pollution or the pollen count is high  Run your air conditioner on recycle, and change air filters often  Shower and wash your hair before bed every night to rinse away pollen  · Reduce exposure to pet dander  If possible, do not keep cats, dogs, birds, or other pets  If you do keep pets in your home, keep them out of bedrooms and carpeted rooms  Bathe them often  · Reduce exposure to mold  Do not spend time in basements  Choose artificial plants instead of live plants  Keep your home's humidity at less than 45%  Do not have ponds or standing water in your home or yard  · Do not smoke  Avoid others who smoke  Ask your healthcare provider for information if you currently smoke and need help to quit      Follow up with your healthcare provider as directed:  Write down your questions so you remember to ask them during your visits  © Copyright [address] Information is for End User's use only and may not be sold, redistributed or otherwise used for commercial purposes  All illustrations and images included in CareNotes® are the copyrighted property of A D A M , Inc  or Keli Gibson  The above information is an  only  It is not intended as medical advice for individual conditions or treatments  Talk to your doctor, nurse or pharmacist before following any medical regimen to see if it is safe and effective for you

## 2021-06-11 NOTE — PROGRESS NOTES
[facility]        NAME: Tawnya Kraus is a 29 y o  male  : 1985    MRN: [id number]  DATE: 2021  TIME: 8:58 PM    Assessment and Plan   Seasonal allergic rhinitis, unspecified trigger [J30 2]  1  Seasonal allergic rhinitis, unspecified trigger           Patient Instructions      continue to use Zyrtec as directed   start using nasal spray as directed   Motrin and/or Tylenol as needed for fever  Follow up with PCP in 3-5 days  Proceed to  ER if symptoms worsen  Chief Complaint     Chief Complaint   Patient presents with    Fever     Patient here with congestion and fever of 100, he also has a headache         History of Present Illness        27-year-old male presents with caretakers with nasal congestion and fever of 100°  He said that he also had a headache  No sick contacts and noted  Has been fully vaccinated for COVID  No cough reported  No vomiting or diarrhea reported  Fever  This is a new problem  The current episode started today  The problem has been waxing and waning  Associated symptoms include a fever and headaches  Pertinent negatives include no chills, congestion or coughing  Nothing aggravates the symptoms  He has tried nothing for the symptoms  The treatment provided no relief  Review of Systems   Review of Systems   Constitutional: Positive for fever  Negative for chills  HENT: Negative  Negative for congestion  Eyes: Negative  Respiratory: Negative  Negative for cough  Cardiovascular: Negative  Gastrointestinal: Negative  Musculoskeletal: Negative  Skin: Negative  Neurological: Positive for headaches           Current Medications       Current Outpatient Medications:     acetaminophen (TYLENOL) 325 mg tablet, Take 1 tablet (325 mg total) by mouth every 4 (four) hours as needed for mild pain or fever, Disp: 30 tablet, Rfl: 5    amLODIPine (NORVASC) 5 mg tablet, TAKE 1 TABLET BY MOUTH ONCE DAILY AT 8AM (HTN)*DELMY, Disp: 30 tablet, Rfl: 11    ammonium lactate (LAC-HYDRIN) 12 % lotion, Apply topically to affected area on skin twice daily as needed for eczema, Disp: 225 g, Rfl: 5    bismuth subsalicylate (PEPTO BISMOL) 524 mg/30 mL oral suspension, Take 15 mL (262 mg total) by mouth every 6 (six) hours as needed for indigestion or diarrhea, Disp: 360 mL, Rfl: 2    Camphor-Eucalyptus-Menthol (Vicks VapoRub) 4 7-1 2-2 6 % OINT, Apply topically 2 (two) times a day as needed (PRN), Disp: 50 g, Rfl: 5    cetirizine (ZyrTEC) 5 MG chewable tablet, Chew 1 tablet (5 mg total) daily at bedtime as needed for allergies, Disp: 30 tablet, Rfl: 5    Colace 100 MG capsule, TAKE 1 CAPSULE BY MOUTH 2 TIMES DAILY AT 8A-5P (CONSTIPATION) *HOLD FOR LOOSE STOOLS, Disp: 180 capsule, Rfl: 3    dextromethorphan-guaiFENesin (ROBITUSSIN DM)  mg/5 mL syrup, Take 5 mL by mouth 3 (three) times a day as needed for cough or congestion, Disp: 118 mL, Rfl: 5    divalproex sodium (DEPAKOTE ER) 500 mg 24 hr tablet, Take 3 tablets (1500 mg total) by mouth once daily at 5 pm, Disp: 30 tablet, Rfl: 6    fluticasone (FLONASE) 50 mcg/act nasal spray, 1 spray into each nostril daily, Disp: 1 Bottle, Rfl: 5    gabapentin (NEURONTIN) 800 mg tablet, Take 1 tablet (800 mg total) by mouth 3 times daily at 8 am, 4 pm, and 8 pm, Disp: , Rfl: 0    ibuprofen (MOTRIN) 600 mg tablet, Take 1 tablet (600 mg total) by mouth every 6 (six) hours as needed for mild pain, moderate pain, fever or headaches, Disp: 30 tablet, Rfl: 5    L-Methylfolate-Algae (DEPLIN 15) 15-90 314 MG CAPS, Take 15 mg by mouth daily Take 1 tablet by mouth once daily at 8AM, Disp: , Rfl:     lanolin-mineral oil (BABY OIL) OIL, Apply topically as needed for dry skin, Disp: 1 Bottle, Rfl: 5    lithium carbonate (LITHOBID) 450 mg CR tablet, Take 2 tablets (900 mg total) by mouth daily at 5:30 pm, Disp: , Rfl: 0    LORazepam (ATIVAN) 0 5 mg tablet, Take 1 mg by mouth 2 (two) times a day , Disp: , Rfl:    neomycin-bacitracin-polymyxin b (NEOSPORIN) ointment, Apply BID PRN for wound care, Disp: 15 g, Rfl: 5    OLANZapine (ZyPREXA) 20 MG tablet, Take 0 5 tablet (10 mg) by mouth twice daily at 12 pm and 1 5 tab(15mg)  8 pm, Disp:  , Rfl: 0    omeprazole (PriLOSEC) 20 mg delayed release capsule, Take 1 capsule (20 mg total) by mouth daily, Disp: 90 capsule, Rfl: 1    phenol (CHLORASEPTIC) 1 4 % mucosal liquid, Apply 1 spray to the mouth or throat every 2 (two) hours as needed (sore throat), Disp: 118 mL, Rfl: 0    pyrithione zinc (HEAD AND SHOULDERS) 1 % shampoo, Apply topically daily as needed for dandruff, Disp: 240 mL, Rfl: 5    Sodium Fluoride (PreviDent 5000 Booster Plus) 1 1 % PSTE, Use a pea-sized amount to brush at bedtime at 8:00 p m , Disp: 100 mL, Rfl: 5    Sulfacetamide Sodium, Acne, 10 % LOTN, Apply topically 2 (two) times a day, Disp: 118 mL, Rfl: 5    Sunscreens (Sunblock SPF30) LOTN, Apply every 2 hours up to five times daily, Disp: 1 Bottle, Rfl: 5    Benzoyl Peroxide (benzoyl peroxide) 10 % LIQD, Apply 3 cm topically daily, Disp: 237 g, Rfl: 5    SODIUM FLUORIDE, DENTAL RINSE, (Listerine Smart Rinse) 0 02 % SOLN, Take 10 mL by mouth 2 (two) times a day as needed (Dental care) Rinse mouth twice daily as needed, Disp: 480 mL, Rfl: 5    Current Allergies     Allergies as of 06/11/2021    (No Known Allergies)            The following portions of the patient's history were reviewed and updated as appropriate: allergies, current medications, past family history, past medical history, past social history, past surgical history and problem list      Past Medical History:   Diagnosis Date    ADHD (attention deficit hyperactivity disorder)     Atypical pervasive developmental disorder     Autism     [facility]     Constipation     Depression     Head-banging     Last Assessed:  9/1/17    Hydronephrosis 9/19/2019    Hyperlipidemia     Hypertension     Intermittent explosive disorder     Oppositional defiant disorder     [facility]     Schizophrenia ([facility]     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     Seizures ([facility]     Sleep disorder     Vitamin D insufficiency     Last Assessed:  6/22/17       Past Surgical History:   Procedure Laterality Date    NO PAST SURGERIES      UMBILICAL HERNIA REPAIR         History reviewed  No pertinent family history  Medications have been verified  Objective   Pulse (!) 121   Temp 99 8 °F (37 7 °C)   Resp (!) 24   Ht 5' 10" (1 778 m)   Wt 99 8 kg (220 lb)   SpO2 94%   BMI 31 57 kg/m²   No LMP for male patient  Physical Exam     Physical Exam  Vitals signs and nursing note reviewed  Constitutional:       General: He is not in acute distress  Appearance: He is well-developed  HENT:      Head: Normocephalic and atraumatic  Right Ear: Hearing, tympanic membrane, ear canal and external ear normal       Left Ear: Hearing, tympanic membrane, ear canal and external ear normal       Nose: Nose normal       Mouth/Throat:      Pharynx: Uvula midline  No oropharyngeal exudate  Eyes:      General:         Right eye: No discharge  Left eye: No discharge  Conjunctiva/sclera: Conjunctivae normal    Neck:      Musculoskeletal: Normal range of motion and neck supple  Cardiovascular:      Rate and Rhythm: Normal rate and regular rhythm  Heart sounds: Normal heart sounds  No murmur  Pulmonary:      Effort: Pulmonary effort is normal  No respiratory distress  Breath sounds: Normal breath sounds  No wheezing or rales  Abdominal:      General: Bowel sounds are normal       Palpations: Abdomen is soft  Tenderness: There is no abdominal tenderness  Musculoskeletal: Normal range of motion  Lymphadenopathy:      Cervical: No cervical adenopathy  Skin:     General: Skin is warm and dry  Neurological:      Mental Status: He is alert and oriented to person, place, and time

## 2021-06-15 DIAGNOSIS — L70.0 ACNE VULGARIS: ICD-10-CM

## 2021-06-15 RX ORDER — BENZOYL PEROXIDE 100 MG/ML
LIQUID TOPICAL
Qty: 237 G | Refills: 4 | Status: SHIPPED | OUTPATIENT
Start: 2021-06-15 | End: 2021-07-13 | Stop reason: SDUPTHER

## 2021-06-16 ENCOUNTER — TELEMEDICINE (OUTPATIENT)
Dept: FAMILY MEDICINE CLINIC | Facility: CLINIC | Age: 36
End: 2021-06-16

## 2021-06-16 DIAGNOSIS — J30.2 SEASONAL ALLERGIC RHINITIS, UNSPECIFIED TRIGGER: Primary | ICD-10-CM

## 2021-06-16 PROCEDURE — 3074F SYST BP LT 130 MM HG: CPT | Performed by: PHYSICIAN ASSISTANT

## 2021-06-16 PROCEDURE — T1015 CLINIC SERVICE: HCPCS | Performed by: PHYSICIAN ASSISTANT

## 2021-06-16 PROCEDURE — 99213 OFFICE O/P EST LOW 20 MIN: CPT | Performed by: PHYSICIAN ASSISTANT

## 2021-06-16 PROCEDURE — 3079F DIAST BP 80-89 MM HG: CPT | Performed by: PHYSICIAN ASSISTANT

## 2021-06-16 NOTE — ASSESSMENT & PLAN NOTE
Improved/resolved  Continue Zyrtec 10 mg daily  Continue nasal spray p r n  Oncology Nutrition Assessment for Medical Nutrition Therapy  Initial Visit    Jeanne Rodgers   1952    Referring Provider:  Kenton Yoder DNP      Reason for Visit: Pt in for education and nutrition counseling     PMHx:   Past Medical History:   Diagnosis Date    Allergy     Anxiety     Breast cancer 2014    Chronic back pain     Depression     Encounter for blood transfusion     Endometrial cancer 07/30/2019    Fibromyalgia     Hives     Hx of psychiatric care     Hx of radiation therapy     Itching     Lichen sclerosus     Mental disorder     PONV (postoperative nausea and vomiting)     Psychiatric problem     PTSD (post-traumatic stress disorder)     Sleep difficulties     Sore throat     Therapy     Uterine cancer 08/05/2019    UTI (urinary tract infection)        Nutrition Assessment    This is a 68 y.o.female with a medical diagnosis of endometrial cancer s/p surgical debulking, chemotherapy and XRT. She developed radiation enteritis following XRT. She has lost about 35lb. Also reports constant nausea. Eating only helps minimally. No medications seem to work and zofran causes severe constipation. She has also tried peppermint, cola syrup, jose tea (fresh made). Has abdmoinal cramping and diarrhea nearly every day. Frequency depends on the day.   Does not seem to matter what she eats.     Diet recall:   Breakfast- wild blueberry muffin  Lunch- spinach salad with hardboiled egg, mushrooms  Evening- usually still feels full   Drinks- orange juice, water, mini coke   Does not do any dairy other than occasional cheese   Eats a lot of fruits and some vegetables.       Weight:64.2 kg (141 lb 8.6 oz)  Height:5' (1.524 m)  BMI:Body mass index is 27.64 kg/m².   IBW: Ideal body weight: 45.5 kg (100 lb 4.9 oz)  Adjusted ideal body weight: 53 kg (116 lb 12.8 oz)    Usual BW: 176lb  Weight Change:35lb loss    Nutrition focused physical findings: mild-moderate muscle wasting in lower  extremities     Allergies: Patient has no known allergies.    Current Medications:    Current Outpatient Medications:     acetaminophen (TYLENOL) 500 MG tablet, Take 500 mg by mouth., Disp: , Rfl:     ALPRAZolam (XANAX) 0.25 MG tablet, Take 1 tablet (0.25 mg total) by mouth 3 (three) times daily as needed for Anxiety., Disp: 90 tablet, Rfl: 1    b complex vitamins tablet, Take 1 tablet by mouth once daily., Disp: , Rfl:     clobetasol 0.05% (TEMOVATE) 0.05 % Oint, APPLY TO AFFECTED AREA(S) TWICE A DAY FOR 1 MONTH(S) then EVERY DAY FOR 1 MONTH(S) then 3 TIMES A WEEK, Disp: 60 g, Rfl: 2    fluconazole (DIFLUCAN) 150 MG Tab, , Disp: , Rfl:     hydrocortisone (ANUSOL-HC) 2.5 % rectal cream, Place rectally 2 (two) times daily., Disp: 30 g, Rfl: 1    levocetirizine (XYZAL) 5 MG tablet, Take 1 tablet (5 mg total) by mouth every evening., Disp: 30 tablet, Rfl: 3    methabner-m.blue-s.phos-phsal-hyo (URIBEL) 118-10-40.8-36 mg Cap, Take 1 capsule by mouth 3 (three) times daily as needed (bladder pain)., Disp: 40 capsule, Rfl: 11    metoclopramide HCl (REGLAN) 10 MG tablet, Take 1 tablet (10 mg total) by mouth 3 (three) times daily with meals., Disp: 90 tablet, Rfl: 1    MILK THISTLE ORAL, Take by mouth., Disp: , Rfl:     nystatin (MYCOSTATIN) cream, Apply topically every Mon, Wed, Fri., Disp: 30 g, Rfl: 11    nystatin (MYCOSTATIN) powder, Apply topically 4 (four) times daily., Disp: 30 g, Rfl: 1    ondansetron (ZOFRAN) 8 MG tablet, TAKE 1 TABLET BY MOUTH EVERY 12 HOURS AS NEEDED FOR NAUSEA, Disp: 30 tablet, Rfl: 2    oxyCODONE-acetaminophen (PERCOCET)  mg per tablet, Take 1 tablet by mouth every 4 (four) hours as needed for Pain., Disp: 30 tablet, Rfl: 0    oxyCODONE-acetaminophen (PERCOCET) 5-325 mg per tablet, Take 1 tablet by mouth every 4 (four) hours as needed., Disp: 30 tablet, Rfl: 0    pantoprazole (PROTONIX) 20 MG tablet, Take 1 tablet (20 mg total) by mouth 2 (two) times daily before meals., Disp:  60 tablet, Rfl: 11    polyethylene glycol (GOLYTELY,NULYTELY) 236-22.74-6.74 -5.86 gram suspension, , Disp: , Rfl:     predniSONE (DELTASONE) 5 MG tablet, Take 3 each morning on odd number days, Disp: 30 tablet, Rfl: 0    promethazine (PHENERGAN) 25 MG tablet, TAKE 1 TABLET BY MOUTH EVERY 6 HOURS AS NEEDED FOR NAUSEA, Disp: 30 tablet, Rfl: 2    scopolamine (TRANSDERM-SCOP) 1.3-1.5 mg (1 mg over 3 days), Place 1 patch onto the skin every 72 hours., Disp: 10 patch, Rfl: 2    silver sulfADIAZINE 1% (SILVADENE) 1 % cream, Apply topically every morning., Disp: 50 g, Rfl: 11    TURMERIC ORAL, Take by mouth., Disp: , Rfl:     Food/medication interactions noted: none    Vitamins/Supplements: per med list     Labs: Reviewed from June     Nutrition Diagnosis    Problem: inadequate protein/energy intake  Etiology (related to): altered GI function   Signs/Symptoms (as evidenced by): weight loss    Nutrition Intervention    Nutrition Prescription   0728-6265 Kcals (25-30kcal/kg)  64-77 g protein (1-1.2g/kg)   6832-6006 mL fluid (25-30mL/kg)    Recommendations:  Recommended and antiinflammatory diet   - Avoid all gluten (found in wheat, rye and barley) and all dairy   - Vitamin D 1000-2000IU daily   - Daily probiotic -  VSL #3    Use ginger capsules for nausea- 500mg three times daily or 1000mg twice daily   Eat foods high in soluble fiber like oatmeal, bananas, apples/applesauce     Acupuncture for nausea   Survivorship visit with Dr. Kam     Materials Provided/Reviewed -emailed all recommendations and diet recs     Nutrition Monitoring and Evaluation    Monitor: diet education needs     Goals: improved quality of life     Motivation to change/anticipated barriers: no barriers identified; highly motivated to follow recommendations     Follow up Patient provided with dietitian contact number and advised to call with questions or make future appointment if further intervention is needed.   Follow up pending visit with  Dr. Kam     Communication to referring provider/care team: discussed with Dr. Kam; note available in chart     Counseling time: 30 Minutes    Madison Coyne, MPH, RD, , LDN, FAND   [phone]

## 2021-06-16 NOTE — PROGRESS NOTES
Virtual Regular Visit      Assessment/Plan:    Problem List Items Addressed This Visit        Respiratory    Seasonal allergic rhinitis - Primary     Improved/resolved  Continue Zyrtec 10 mg daily  Continue nasal spray p r n  Reason for visit is   Chief Complaint   Patient presents with    Virtual Regular Visit        Encounter provider Jack Polo PA-C    Provider located at [address] [place] [id number] [phone]      Recent Visits  No visits were found meeting these conditions  Showing recent visits within past 7 days and meeting all other requirements  Today's Visits  Date Type Provider Dept   06/16/21 Telemedicine Jack Polo PA-C  Shira Venegas   Showing today's visits and meeting all other requirements  Future Appointments  No visits were found meeting these conditions  Showing future appointments within next 150 days and meeting all other requirements       The patient was identified by name and date of birth  Zachery Franklin was informed that this is a telemedicine visit and that the visit is being conducted through [address] [place] [address] and patient was informed that this is a secure, HIPAA-compliant platform  He agrees to proceed     My office door was closed  No one else was in the room  He acknowledged consent and understanding of privacy and security of the video platform  The patient has agreed to participate and understands they can discontinue the visit at any time  Patient is aware this is a billable service  Subjective  Zachery Franklin is a 29 y o  male presents with caregiver present for Urgent care f/u  Pt was seen at Urgent care now on 6/11/2021 for complaint of congestion, headache, and a fever of 100 F  He was advised to continue Zyrtec as directed, start using nasal spray, and  Motrin and/or Tylenol as needed for fever    History mostly obtained by caregiver Gage Chiang, whom reports symptoms have since resolved  No further fever, headache, or congestion  Continues on Zyrtec daily and using nasal spray PRN            Past Medical History:   Diagnosis Date    ADHD (attention deficit hyperactivity disorder)     Atypical pervasive developmental disorder     Autism     Bipolar disorder ([place] [address] )     Constipation     Depression     Head-banging     Last Assessed:  9/1/17    Hydronephrosis 9/19/2019    Hyperlipidemia     Hypertension     Intermittent explosive disorder     Oppositional defiant disorder     Personality disorder ([place] [address] )     Schizophrenia ([place] [address] )     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[place] [address] )     Seizures ([place] [address] )     Sleep disorder     Vitamin D insufficiency     Last Assessed:  6/22/17       Past Surgical History:   Procedure Laterality Date    NO PAST SURGERIES      UMBILICAL HERNIA REPAIR         Current Outpatient Medications   Medication Sig Dispense Refill    acetaminophen (TYLENOL) 325 mg tablet Take 1 tablet (325 mg total) by mouth every 4 (four) hours as needed for mild pain or fever 30 tablet 5    amLODIPine (NORVASC) 5 mg tablet TAKE 1 TABLET BY MOUTH ONCE DAILY AT 8AM (HTN)*BROCK 30 tablet 11    ammonium lactate (LAC-HYDRIN) 12 % lotion Apply topically to affected area on skin twice daily as needed for eczema 225 g 5    benzoyl peroxide 10 % external wash USE TO WASH FACE/ SHOULDERS/BACK DAILY AS NEEDED (ACNE)*STOP USING IF EXCESSIVE IRRIT/ g 4    bismuth subsalicylate (PEPTO BISMOL) 524 mg/30 mL oral suspension Take 15 mL (262 mg total) by mouth every 6 (six) hours as needed for indigestion or diarrhea 360 mL 2    Camphor-Eucalyptus-Menthol (Vicks VapoRub) 4 7-1 2-2 6 % OINT Apply topically 2 (two) times a day as needed (PRN) 50 g 5    cetirizine (ZyrTEC) 5 MG chewable tablet Chew 1 tablet (5 mg total) daily at bedtime as needed for allergies 30 tablet 5    Colace 100 MG capsule TAKE 1 CAPSULE BY MOUTH 2 TIMES DAILY AT 8A-5P (CONSTIPATION) *HOLD FOR LOOSE STOOLS 180 capsule 3    dextromethorphan-guaiFENesin (ROBITUSSIN DM)  mg/5 mL syrup Take 5 mL by mouth 3 (three) times a day as needed for cough or congestion 118 mL 5    divalproex sodium (DEPAKOTE ER) 500 mg 24 hr tablet Take 3 tablets (1500 mg total) by mouth once daily at 5 pm 30 tablet 6    fluticasone (FLONASE) 50 mcg/act nasal spray 1 spray into each nostril daily 1 Bottle 5    gabapentin (NEURONTIN) 800 mg tablet Take 1 tablet (800 mg total) by mouth 3 times daily at 8 am, 4 pm, and 8 pm  0    ibuprofen (MOTRIN) 600 mg tablet Take 1 tablet (600 mg total) by mouth every 6 (six) hours as needed for mild pain, moderate pain, fever or headaches 30 tablet 5    L-Methylfolate-Algae (DEPLIN 15) 15-90 314 MG CAPS Take 15 mg by mouth daily Take 1 tablet by mouth once daily at 8AM      lanolin-mineral oil (BABY OIL) OIL Apply topically as needed for dry skin 1 Bottle 5    lithium carbonate (LITHOBID) 450 mg CR tablet Take 2 tablets (900 mg total) by mouth daily at 5:30 pm  0    LORazepam (ATIVAN) 0 5 mg tablet Take 1 mg by mouth 2 (two) times a day       neomycin-bacitracin-polymyxin b (NEOSPORIN) ointment Apply BID PRN for wound care 15 g 5    OLANZapine (ZyPREXA) 20 MG tablet Take 0 5 tablet (10 mg) by mouth twice daily at 12 pm and 1 5 tab(15mg)  8 pm  0    omeprazole (PriLOSEC) 20 mg delayed release capsule Take 1 capsule (20 mg total) by mouth daily 90 capsule 1    phenol (CHLORASEPTIC) 1 4 % mucosal liquid Apply 1 spray to the mouth or throat every 2 (two) hours as needed (sore throat) 118 mL 0    pyrithione zinc (HEAD AND SHOULDERS) 1 % shampoo Apply topically daily as needed for dandruff 240 mL 5    Sodium Fluoride (PreviDent 5000 Booster Plus) 1 1 % PSTE Use a pea-sized amount to brush at bedtime at 8:00 p m  100 mL 5    SODIUM FLUORIDE, DENTAL RINSE, (Listerine Smart Rinse) 0 02 % SOLN Take 10 mL by mouth 2 (two) times a day as needed (Dental care) Rinse mouth twice daily as needed 480 mL 5    Sulfacetamide Sodium, Acne, 10 % LOTN Apply topically 2 (two) times a day 118 mL 5    Sunscreens (Sunblock SPF30) LOTN Apply every 2 hours up to five times daily 1 Bottle 5     No current facility-administered medications for this visit  No Known Allergies    Review of Systems   Constitutional: Negative for chills and fever  HENT: Negative for congestion, postnasal drip and rhinorrhea  Respiratory: Negative for cough, shortness of breath and wheezing  All other systems reviewed and are negative  Video Exam    There were no vitals filed for this visit  Physical Exam  Constitutional:       General: He is not in acute distress  Appearance: Normal appearance  HENT:      Head: Normocephalic and atraumatic  Neurological:      Mental Status: He is alert  Mental status is at baseline  Psychiatric:         Speech: Speech is delayed  Behavior: Behavior is cooperative  I spent 5 minutes directly with the patient during this visit      [address] acknowledges that he has consented to an online visit or consultation  He understands that the online visit is based solely on information provided by him, and that, in the absence of a face-to-face physical evaluation by the physician, the diagnosis he receives is both limited and provisional in terms of accuracy and completeness  This is not intended to replace a full medical face-to-face evaluation by the physician  Shalini Marques understands and accepts these terms

## 2021-06-17 DIAGNOSIS — K08.9 POOR DENTITION: ICD-10-CM

## 2021-07-01 DIAGNOSIS — K59.00 CONSTIPATION: ICD-10-CM

## 2021-07-01 DIAGNOSIS — K21.9 GASTROESOPHAGEAL REFLUX DISEASE: ICD-10-CM

## 2021-07-01 RX ORDER — OMEPRAZOLE 20 MG/1
CAPSULE, DELAYED RELEASE ORAL
Qty: 31 CAPSULE | Refills: 0 | Status: SHIPPED | OUTPATIENT
Start: 2021-07-01 | End: 2021-09-08

## 2021-07-01 RX ORDER — DOCUSATE SODIUM 100 MG/1
CAPSULE ORAL
Qty: 62 CAPSULE | Refills: 0 | Status: SHIPPED | OUTPATIENT
Start: 2021-07-01 | End: 2021-08-30 | Stop reason: HOSPADM

## 2021-07-12 ENCOUNTER — TELEPHONE (OUTPATIENT)
Dept: FAMILY MEDICINE CLINIC | Facility: CLINIC | Age: 36
End: 2021-07-12

## 2021-07-13 ENCOUNTER — OFFICE VISIT (OUTPATIENT)
Dept: FAMILY MEDICINE CLINIC | Facility: CLINIC | Age: 36
End: 2021-07-13

## 2021-07-13 VITALS
WEIGHT: 221.8 LBS | BODY MASS INDEX: 31.75 KG/M2 | RESPIRATION RATE: 14 BRPM | HEART RATE: 78 BPM | HEIGHT: 70 IN | TEMPERATURE: 99.7 F | SYSTOLIC BLOOD PRESSURE: 140 MMHG | DIASTOLIC BLOOD PRESSURE: 80 MMHG

## 2021-07-13 DIAGNOSIS — L30.9 DERMATITIS: ICD-10-CM

## 2021-07-13 DIAGNOSIS — L21.0 DANDRUFF: ICD-10-CM

## 2021-07-13 DIAGNOSIS — Z00.00 ANNUAL PHYSICAL EXAM: Primary | ICD-10-CM

## 2021-07-13 DIAGNOSIS — L70.0 ACNE VULGARIS: ICD-10-CM

## 2021-07-13 PROCEDURE — 99395 PREV VISIT EST AGE 18-39: CPT | Performed by: PHYSICIAN ASSISTANT

## 2021-07-13 PROCEDURE — T1015 CLINIC SERVICE: HCPCS | Performed by: PHYSICIAN ASSISTANT

## 2021-07-13 RX ORDER — AMMONIUM LACTATE 12 G/100G
LOTION TOPICAL
Qty: 225 G | Refills: 5 | Status: SHIPPED | OUTPATIENT
Start: 2021-07-13

## 2021-07-13 RX ORDER — SULFACETAMIDE SODIUM 100 MG/ML
LOTION TOPICAL 2 TIMES DAILY
Qty: 118 ML | Refills: 5 | Status: SHIPPED | OUTPATIENT
Start: 2021-07-13

## 2021-07-13 RX ORDER — BENZOYL PEROXIDE 100 MG/ML
LIQUID TOPICAL
Qty: 237 G | Refills: 4 | Status: SHIPPED | OUTPATIENT
Start: 2021-07-13

## 2021-07-13 NOTE — ASSESSMENT & PLAN NOTE
BMI Counseling: Body mass index is 31 82 kg/m²  The BMI is above normal  Nutrition recommendations include reducing portion sizes, decreasing overall calorie intake, 3-5 servings of fruits/vegetables daily, consuming healthier snacks, decreasing soda and/or juice intake and increasing intake of lean protein  Exercise recommendations include exercising 3-5 times per week      Immunizations are up-to-date   Last labs 10/2020  Physical forms completed,copies made, and original forms given to caregiver

## 2021-07-13 NOTE — PROGRESS NOTES
Assessment/Plan:    Annual physical exam  BMI Counseling: Body mass index is 31 82 kg/m²  The BMI is above normal  Nutrition recommendations include reducing portion sizes, decreasing overall calorie intake, 3-5 servings of fruits/vegetables daily, consuming healthier snacks, decreasing soda and/or juice intake and increasing intake of lean protein  Exercise recommendations include exercising 3-5 times per week  Immunizations are up-to-date   Last labs 10/2020  Physical forms completed,copies made, and original forms given to caregiver        Diagnoses and all orders for this visit:    Annual physical exam    Dandruff  -     pyrithione zinc (HEAD AND SHOULDERS) 1 % shampoo; Apply topically daily as needed for dandruff    Acne vulgaris  -     Sulfacetamide Sodium, Acne, 10 % LOTN; Apply topically 2 (two) times a day  -     Benzoyl Peroxide (benzoyl peroxide) 10 % LIQD; Used to wash/face/ shoulders/ back daily as needed (acne)    Dermatitis  -     ammonium lactate (LAC-HYDRIN) 12 % lotion; Apply topically to affected area on skin twice daily as needed for eczema        Subjective:      Patient ID: Maxx Waters is a 29 y o  male presents today with caregivers for annual physical exam  Pt and staff report no new changes  No complaints voiced at this time  The following portions of the patient's history were reviewed and updated as appropriate: allergies, current medications, past family history, past medical history, past social history, past surgical history and problem list     Review of Systems   Constitutional: Negative for chills, fatigue and fever  Respiratory: Negative for cough, chest tightness, shortness of breath and wheezing  Cardiovascular: Negative for chest pain and palpitations  Gastrointestinal: Negative for abdominal pain, constipation, diarrhea, nausea and vomiting  Genitourinary: Negative for difficulty urinating     Musculoskeletal: Negative for arthralgias, myalgias, neck pain and neck stiffness  Skin: Negative for rash and wound  Neurological: Negative for dizziness, weakness, light-headedness, numbness and headaches  Psychiatric/Behavioral: Positive for behavioral problems  Negative for agitation and dysphoric mood  The patient is not nervous/anxious  Objective:      /80 (BP Location: Left arm, Patient Position: Sitting, Cuff Size: Large)   Pulse 78   Temp 99 7 °F (37 6 °C) (Temporal)   Resp 14   Ht 5' 10" (1 778 m)   Wt 101 kg (221 lb 12 8 oz)   BMI 31 82 kg/m²          Physical Exam  Constitutional:       General: He is not in acute distress  Appearance: Normal appearance  He is well-developed  He is not ill-appearing  HENT:      Head: Normocephalic and atraumatic  Right Ear: Tympanic membrane, ear canal and external ear normal       Left Ear: Tympanic membrane, ear canal and external ear normal       Nose: Nose normal       Mouth/Throat:      Mouth: Mucous membranes are moist       Pharynx: No oropharyngeal exudate or posterior oropharyngeal erythema  Comments:  Poor dentition numerous missing teeth  Eyes:      General:         Right eye: No discharge  Left eye: No discharge  Conjunctiva/sclera: Conjunctivae normal       Pupils: Pupils are equal, round, and reactive to light  Neck:      Thyroid: No thyroid mass  Cardiovascular:      Rate and Rhythm: Normal rate and regular rhythm  Heart sounds: Normal heart sounds and S1 normal  No murmur heard  Pulmonary:      Effort: Pulmonary effort is normal  No respiratory distress  Breath sounds: Normal breath sounds  No wheezing or rales  Abdominal:      General: Bowel sounds are normal  There is no distension  Palpations: Abdomen is soft  There is no mass  Tenderness: There is no abdominal tenderness  There is no guarding or rebound  Musculoskeletal:         General: No deformity  Normal range of motion        Cervical back: Normal range of motion and neck supple  Lymphadenopathy:      Cervical: No cervical adenopathy  Skin:     General: Skin is warm and dry  Capillary Refill: Capillary refill takes less than 2 seconds  Neurological:      General: No focal deficit present  Mental Status: He is alert  Psychiatric:         Mood and Affect: Mood normal          Behavior: Behavior is cooperative        Comments:  Speech -difficult to understand at times

## 2021-07-27 ENCOUNTER — OFFICE VISIT (OUTPATIENT)
Dept: DENTISTRY | Facility: CLINIC | Age: 36
End: 2021-07-27

## 2021-07-27 VITALS — TEMPERATURE: 98.7 F | SYSTOLIC BLOOD PRESSURE: 135 MMHG | HEART RATE: 103 BPM | DIASTOLIC BLOOD PRESSURE: 82 MMHG

## 2021-07-27 DIAGNOSIS — Z01.20 ENCOUNTER FOR DENTAL EXAMINATION: Primary | ICD-10-CM

## 2021-07-27 NOTE — PROGRESS NOTES
Periodic Exam    Latrice Hardin presents for periodic exam  Reviewed PMH, no changes  Pt's care-taker was present and communicating with me and Dr Reba Jacob through the appointment  Pt's CC is occasional pain in the UR region  Clinical exam showed tenderness to percussion on #3  Pt's current occlusion shows excessive bite on #3/30 - the pt is only occluding on those teeth  PA taken - no caries or PAP found on apices  The only permanent solution is a complete denture, but pt compliance could be an issue  Dr Reba Jacob recommended monitoring the tooth at periodic visits due to limited tx options to address pt's occasional tenderness on #30         Dr Moises Jacob      [place]: [facility]

## 2021-07-30 DIAGNOSIS — K30 INDIGESTION: ICD-10-CM

## 2021-07-30 DIAGNOSIS — R19.7 DIARRHEA, UNSPECIFIED TYPE: ICD-10-CM

## 2021-08-12 ENCOUNTER — APPOINTMENT (OUTPATIENT)
Dept: LAB | Facility: CLINIC | Age: 36
End: 2021-08-12
Payer: COMMERCIAL

## 2021-08-12 DIAGNOSIS — Z79.899 ENCOUNTER FOR LONG-TERM (CURRENT) USE OF OTHER MEDICATIONS: ICD-10-CM

## 2021-08-12 LAB
ALBUMIN SERPL BCP-MCNC: 3.5 G/DL (ref 3.5–5)
ALP SERPL-CCNC: 52 U/L (ref 46–116)
ALT SERPL W P-5'-P-CCNC: 34 U/L (ref 12–78)
ANION GAP SERPL CALCULATED.3IONS-SCNC: 5 MMOL/L (ref 4–13)
AST SERPL W P-5'-P-CCNC: 20 U/L (ref 5–45)
BASOPHILS # BLD AUTO: 0.06 THOUSANDS/ΜL (ref 0–0.1)
BASOPHILS NFR BLD AUTO: 1 % (ref 0–1)
BILIRUB SERPL-MCNC: 0.32 MG/DL (ref 0.2–1)
BUN SERPL-MCNC: 12 MG/DL (ref 5–25)
CALCIUM SERPL-MCNC: 9.5 MG/DL (ref 8.3–10.1)
CHLORIDE SERPL-SCNC: 109 MMOL/L (ref 100–108)
CHOLEST SERPL-MCNC: 203 MG/DL (ref 50–200)
CO2 SERPL-SCNC: 25 MMOL/L (ref 21–32)
CREAT SERPL-MCNC: 0.65 MG/DL (ref 0.6–1.3)
EOSINOPHIL # BLD AUTO: 0.19 THOUSAND/ΜL (ref 0–0.61)
EOSINOPHIL NFR BLD AUTO: 2 % (ref 0–6)
ERYTHROCYTE [DISTWIDTH] IN BLOOD BY AUTOMATED COUNT: 16.7 % (ref 11.6–15.1)
GFR SERPL CREATININE-BSD FRML MDRD: 126 ML/MIN/1.73SQ M
GLUCOSE P FAST SERPL-MCNC: 90 MG/DL (ref 65–99)
HCT VFR BLD AUTO: 43.1 % (ref 36.5–49.3)
HDLC SERPL-MCNC: 47 MG/DL
HGB BLD-MCNC: 12.5 G/DL (ref 12–17)
IMM GRANULOCYTES # BLD AUTO: 0.02 THOUSAND/UL (ref 0–0.2)
IMM GRANULOCYTES NFR BLD AUTO: 0 % (ref 0–2)
LDLC SERPL CALC-MCNC: 131 MG/DL (ref 0–100)
LITHIUM SERPL-SCNC: 0.6 MMOL/L (ref 0.5–1)
LYMPHOCYTES # BLD AUTO: 2.64 THOUSANDS/ΜL (ref 0.6–4.47)
LYMPHOCYTES NFR BLD AUTO: 33 % (ref 14–44)
MCH RBC QN AUTO: 23.4 PG (ref 26.8–34.3)
MCHC RBC AUTO-ENTMCNC: 29 G/DL (ref 31.4–37.4)
MCV RBC AUTO: 81 FL (ref 82–98)
MONOCYTES # BLD AUTO: 0.79 THOUSAND/ΜL (ref 0.17–1.22)
MONOCYTES NFR BLD AUTO: 10 % (ref 4–12)
NEUTROPHILS # BLD AUTO: 4.34 THOUSANDS/ΜL (ref 1.85–7.62)
NEUTS SEG NFR BLD AUTO: 54 % (ref 43–75)
NONHDLC SERPL-MCNC: 156 MG/DL
NRBC BLD AUTO-RTO: 0 /100 WBCS
PLATELET # BLD AUTO: 267 THOUSANDS/UL (ref 149–390)
PMV BLD AUTO: 12.3 FL (ref 8.9–12.7)
POTASSIUM SERPL-SCNC: 4.1 MMOL/L (ref 3.5–5.3)
PROT SERPL-MCNC: 8 G/DL (ref 6.4–8.2)
RBC # BLD AUTO: 5.35 MILLION/UL (ref 3.88–5.62)
SODIUM SERPL-SCNC: 139 MMOL/L (ref 136–145)
T4 FREE SERPL-MCNC: 0.8 NG/DL (ref 0.76–1.46)
TRIGL SERPL-MCNC: 127 MG/DL
TSH SERPL DL<=0.05 MIU/L-ACNC: 1.57 UIU/ML (ref 0.36–3.74)
VALPROATE SERPL-MCNC: 69 UG/ML (ref 50–100)
WBC # BLD AUTO: 8.04 THOUSAND/UL (ref 4.31–10.16)

## 2021-08-12 PROCEDURE — 85025 COMPLETE CBC W/AUTO DIFF WBC: CPT

## 2021-08-12 PROCEDURE — 80061 LIPID PANEL: CPT

## 2021-08-12 PROCEDURE — 84443 ASSAY THYROID STIM HORMONE: CPT

## 2021-08-12 PROCEDURE — 80178 ASSAY OF LITHIUM: CPT

## 2021-08-12 PROCEDURE — 84439 ASSAY OF FREE THYROXINE: CPT

## 2021-08-12 PROCEDURE — 80053 COMPREHEN METABOLIC PANEL: CPT

## 2021-08-12 PROCEDURE — 80164 ASSAY DIPROPYLACETIC ACD TOT: CPT

## 2021-08-12 PROCEDURE — 36415 COLL VENOUS BLD VENIPUNCTURE: CPT

## 2021-08-18 ENCOUNTER — OFFICE VISIT (OUTPATIENT)
Dept: DENTISTRY | Facility: CLINIC | Age: 36
End: 2021-08-18

## 2021-08-18 DIAGNOSIS — Z01.20 ENCOUNTER FOR DENTAL EXAMINATION: Primary | ICD-10-CM

## 2021-08-18 DIAGNOSIS — K03.6 ACCRETIONS ON TEETH: ICD-10-CM

## 2021-08-18 PROCEDURE — D1110 PROPHYLAXIS - ADULT: HCPCS

## 2021-08-18 PROCEDURE — D1206 TOPICAL APPLICATION OF FLUORIDE VARNISH: HCPCS

## 2021-08-18 NOTE — PROGRESS NOTES
Prophy    Dental procedures in this visit     - PROPHYLAXIS - ADULT (Completed)     [facility] [address] [place] [place] [address]     Billing provider: Jose Navarro DMD     - TOPICAL APPLICATION OF FLUORIDE VARNISH (Completed)     Service provider: [facility], [address] [place] [address]     Billing provider: Jose Navarro DMD       Method Used:  · Prophy Method Used: Hand Scaling  · Polished  · Flossed    Radiographs Taken:  · None    Intra/Extra Oral Cancer Screening:  · Within normal limits    Orthodontic Screening:  · NA    Oral Hygiene:  · Fair    Plaque:  · Light  · Moderate    Calculus:  · None    Bleeding:  Bleeding on probing: No periodontal exam for this visit  · None    Stain:  · None    Periodontal Charting:  · Spot probing    Periodontal Classification:  · Mild      Nutritional Counseling:  · caregiver states limited juice  dilutes  good diet    Manish claims his tooth hurts lower right   This has been addressed before   Nothing clinically evident  He lost crown approx 1 yr ago and he has no room for a new crown    Caregiver stated he had not mentioned it in months   Fl2 varnish placed  Patient dismissed  Advised to call if it becomes a problem   Dr Mahamed Gr checked area, too    He is due for x rays and periodic exam next pro   Cont  Use of Prevident    No orders of the defined types were placed in this encounter    NV  Period exam, pro, BWX

## 2021-08-25 ENCOUNTER — HOSPITAL ENCOUNTER (INPATIENT)
Facility: HOSPITAL | Age: 36
LOS: 3 days | Discharge: HOME | DRG: 248 | End: 2021-08-30
Attending: EMERGENCY MEDICINE | Admitting: FAMILY MEDICINE
Payer: COMMERCIAL

## 2021-08-25 ENCOUNTER — APPOINTMENT (EMERGENCY)
Dept: RADIOLOGY | Facility: HOSPITAL | Age: 36
DRG: 248 | End: 2021-08-25
Payer: COMMERCIAL

## 2021-08-25 DIAGNOSIS — R10.13 EPIGASTRIC PAIN: ICD-10-CM

## 2021-08-25 DIAGNOSIS — K62.5 RECTAL BLEEDING: ICD-10-CM

## 2021-08-25 DIAGNOSIS — K52.9 COLITIS, ACUTE: ICD-10-CM

## 2021-08-25 DIAGNOSIS — K52.9 COLITIS: Primary | ICD-10-CM

## 2021-08-25 LAB
ALBUMIN SERPL BCP-MCNC: 2.9 G/DL (ref 3.5–5)
ALP SERPL-CCNC: 56 U/L (ref 46–116)
ALT SERPL W P-5'-P-CCNC: 58 U/L (ref 12–78)
ANION GAP SERPL CALCULATED.3IONS-SCNC: 6 MMOL/L (ref 4–13)
AST SERPL W P-5'-P-CCNC: 35 U/L (ref 5–45)
ATRIAL RATE: 101 BPM
BASOPHILS # BLD AUTO: 0.06 THOUSANDS/ΜL (ref 0–0.1)
BASOPHILS NFR BLD AUTO: 1 % (ref 0–1)
BILIRUB SERPL-MCNC: 0.35 MG/DL (ref 0.2–1)
BILIRUB UR QL STRIP: NEGATIVE
BUN SERPL-MCNC: 13 MG/DL (ref 5–25)
CALCIUM ALBUM COR SERPL-MCNC: 9.8 MG/DL (ref 8.3–10.1)
CALCIUM SERPL-MCNC: 8.9 MG/DL (ref 8.3–10.1)
CHLORIDE SERPL-SCNC: 108 MMOL/L (ref 100–108)
CLARITY UR: CLEAR
CO2 SERPL-SCNC: 25 MMOL/L (ref 21–32)
COLOR UR: YELLOW
CREAT SERPL-MCNC: 0.83 MG/DL (ref 0.6–1.3)
EOSINOPHIL # BLD AUTO: 0.17 THOUSAND/ΜL (ref 0–0.61)
EOSINOPHIL NFR BLD AUTO: 1 % (ref 0–6)
ERYTHROCYTE [DISTWIDTH] IN BLOOD BY AUTOMATED COUNT: 16.2 % (ref 11.6–15.1)
GFR SERPL CREATININE-BSD FRML MDRD: 114 ML/MIN/1.73SQ M
GLUCOSE SERPL-MCNC: 90 MG/DL (ref 65–140)
GLUCOSE UR STRIP-MCNC: NEGATIVE MG/DL
HCT VFR BLD AUTO: 41.2 % (ref 36.5–49.3)
HGB BLD-MCNC: 12 G/DL (ref 12–17)
HGB UR QL STRIP.AUTO: NEGATIVE
IMM GRANULOCYTES # BLD AUTO: 0.05 THOUSAND/UL (ref 0–0.2)
IMM GRANULOCYTES NFR BLD AUTO: 0 % (ref 0–2)
KETONES UR STRIP-MCNC: NEGATIVE MG/DL
LEUKOCYTE ESTERASE UR QL STRIP: NEGATIVE
LIPASE SERPL-CCNC: 66 U/L (ref 73–393)
LYMPHOCYTES # BLD AUTO: 1.78 THOUSANDS/ΜL (ref 0.6–4.47)
LYMPHOCYTES NFR BLD AUTO: 15 % (ref 14–44)
MCH RBC QN AUTO: 22.9 PG (ref 26.8–34.3)
MCHC RBC AUTO-ENTMCNC: 29.1 G/DL (ref 31.4–37.4)
MCV RBC AUTO: 79 FL (ref 82–98)
MONOCYTES # BLD AUTO: 2.6 THOUSAND/ΜL (ref 0.17–1.22)
MONOCYTES NFR BLD AUTO: 21 % (ref 4–12)
NEUTROPHILS # BLD AUTO: 7.5 THOUSANDS/ΜL (ref 1.85–7.62)
NEUTS SEG NFR BLD AUTO: 62 % (ref 43–75)
NITRITE UR QL STRIP: NEGATIVE
NRBC BLD AUTO-RTO: 0 /100 WBCS
P AXIS: 26 DEGREES
PH UR STRIP.AUTO: 6.5 [PH]
PLATELET # BLD AUTO: 251 THOUSANDS/UL (ref 149–390)
PMV BLD AUTO: 11.5 FL (ref 8.9–12.7)
POTASSIUM SERPL-SCNC: 3.9 MMOL/L (ref 3.5–5.3)
PR INTERVAL: 166 MS
PROT SERPL-MCNC: 8 G/DL (ref 6.4–8.2)
PROT UR STRIP-MCNC: NEGATIVE MG/DL
QRS AXIS: 95 DEGREES
QRSD INTERVAL: 142 MS
QT INTERVAL: 374 MS
QTC INTERVAL: 484 MS
RBC # BLD AUTO: 5.24 MILLION/UL (ref 3.88–5.62)
SARS-COV-2 RNA RESP QL NAA+PROBE: NEGATIVE
SODIUM SERPL-SCNC: 139 MMOL/L (ref 136–145)
SP GR UR STRIP.AUTO: 1.02 (ref 1–1.03)
T WAVE AXIS: 5 DEGREES
TROPONIN I SERPL-MCNC: <0.02 NG/ML
UROBILINOGEN UR QL STRIP.AUTO: 0.2 E.U./DL
VALPROATE SERPL-MCNC: 32 UG/ML (ref 50–100)
VENTRICULAR RATE: 101 BPM
WBC # BLD AUTO: 12.16 THOUSAND/UL (ref 4.31–10.16)

## 2021-08-25 PROCEDURE — U0003 INFECTIOUS AGENT DETECTION BY NUCLEIC ACID (DNA OR RNA); SEVERE ACUTE RESPIRATORY SYNDROME CORONAVIRUS 2 (SARS-COV-2) (CORONAVIRUS DISEASE [COVID-19]), AMPLIFIED PROBE TECHNIQUE, MAKING USE OF HIGH THROUGHPUT TECHNOLOGIES AS DESCRIBED BY CMS-2020-01-R: HCPCS

## 2021-08-25 PROCEDURE — 83690 ASSAY OF LIPASE: CPT

## 2021-08-25 PROCEDURE — 36415 COLL VENOUS BLD VENIPUNCTURE: CPT

## 2021-08-25 PROCEDURE — 85025 COMPLETE CBC W/AUTO DIFF WBC: CPT

## 2021-08-25 PROCEDURE — 84484 ASSAY OF TROPONIN QUANT: CPT

## 2021-08-25 PROCEDURE — U0005 INFEC AGEN DETEC AMPLI PROBE: HCPCS

## 2021-08-25 PROCEDURE — 74177 CT ABD & PELVIS W/CONTRAST: CPT

## 2021-08-25 PROCEDURE — 93005 ELECTROCARDIOGRAM TRACING: CPT

## 2021-08-25 PROCEDURE — 80053 COMPREHEN METABOLIC PANEL: CPT

## 2021-08-25 PROCEDURE — 80164 ASSAY DIPROPYLACETIC ACD TOT: CPT

## 2021-08-25 PROCEDURE — 81003 URINALYSIS AUTO W/O SCOPE: CPT | Performed by: EMERGENCY MEDICINE

## 2021-08-25 PROCEDURE — 99285 EMERGENCY DEPT VISIT HI MDM: CPT | Performed by: EMERGENCY MEDICINE

## 2021-08-25 PROCEDURE — 99285 EMERGENCY DEPT VISIT HI MDM: CPT

## 2021-08-25 PROCEDURE — 71046 X-RAY EXAM CHEST 2 VIEWS: CPT

## 2021-08-25 PROCEDURE — 93010 ELECTROCARDIOGRAM REPORT: CPT | Performed by: INTERNAL MEDICINE

## 2021-08-25 PROCEDURE — 96365 THER/PROPH/DIAG IV INF INIT: CPT

## 2021-08-25 RX ORDER — LORAZEPAM 1 MG/1
1 TABLET ORAL 2 TIMES DAILY
Status: DISCONTINUED | OUTPATIENT
Start: 2021-08-25 | End: 2021-08-30 | Stop reason: HOSPADM

## 2021-08-25 RX ORDER — GABAPENTIN 400 MG/1
800 CAPSULE ORAL 3 TIMES DAILY
Status: DISCONTINUED | OUTPATIENT
Start: 2021-08-25 | End: 2021-08-30 | Stop reason: HOSPADM

## 2021-08-25 RX ORDER — CEFAZOLIN SODIUM 2 G/50ML
2000 SOLUTION INTRAVENOUS ONCE
Status: COMPLETED | OUTPATIENT
Start: 2021-08-25 | End: 2021-08-25

## 2021-08-25 RX ORDER — LITHIUM CARBONATE 450 MG
900 TABLET, EXTENDED RELEASE ORAL DAILY
Status: DISCONTINUED | OUTPATIENT
Start: 2021-08-26 | End: 2021-08-30 | Stop reason: HOSPADM

## 2021-08-25 RX ORDER — DOCUSATE SODIUM 100 MG/1
100 CAPSULE, LIQUID FILLED ORAL 2 TIMES DAILY
Status: DISCONTINUED | OUTPATIENT
Start: 2021-08-25 | End: 2021-08-28

## 2021-08-25 RX ORDER — GUAIFENESIN/DEXTROMETHORPHAN 100-10MG/5
5 SYRUP ORAL 3 TIMES DAILY PRN
Status: DISCONTINUED | OUTPATIENT
Start: 2021-08-25 | End: 2021-08-30 | Stop reason: HOSPADM

## 2021-08-25 RX ORDER — ACETAMINOPHEN 325 MG/1
325 TABLET ORAL EVERY 4 HOURS PRN
Status: DISCONTINUED | OUTPATIENT
Start: 2021-08-25 | End: 2021-08-30 | Stop reason: HOSPADM

## 2021-08-25 RX ORDER — DIVALPROEX SODIUM 500 MG/1
500 TABLET, EXTENDED RELEASE ORAL DAILY
Status: DISCONTINUED | OUTPATIENT
Start: 2021-08-26 | End: 2021-08-30 | Stop reason: HOSPADM

## 2021-08-25 RX ORDER — IBUPROFEN 600 MG/1
600 TABLET ORAL EVERY 6 HOURS PRN
Status: DISCONTINUED | OUTPATIENT
Start: 2021-08-25 | End: 2021-08-30 | Stop reason: HOSPADM

## 2021-08-25 RX ORDER — PANTOPRAZOLE SODIUM 40 MG/1
40 TABLET, DELAYED RELEASE ORAL
Status: DISCONTINUED | OUTPATIENT
Start: 2021-08-26 | End: 2021-08-30 | Stop reason: HOSPADM

## 2021-08-25 RX ORDER — AMLODIPINE BESYLATE 5 MG/1
5 TABLET ORAL DAILY
Status: DISCONTINUED | OUTPATIENT
Start: 2021-08-25 | End: 2021-08-30 | Stop reason: HOSPADM

## 2021-08-25 RX ORDER — FLUTICASONE PROPIONATE 50 MCG
1 SPRAY, SUSPENSION (ML) NASAL DAILY
Status: DISCONTINUED | OUTPATIENT
Start: 2021-08-26 | End: 2021-08-30 | Stop reason: HOSPADM

## 2021-08-25 RX ORDER — LORATADINE 10 MG/1
5 TABLET ORAL DAILY
Status: DISCONTINUED | OUTPATIENT
Start: 2021-08-26 | End: 2021-08-30 | Stop reason: HOSPADM

## 2021-08-25 RX ORDER — OLANZAPINE 5 MG/1
5 TABLET ORAL
Status: DISCONTINUED | OUTPATIENT
Start: 2021-08-25 | End: 2021-08-30 | Stop reason: HOSPADM

## 2021-08-25 RX ADMIN — SODIUM CHLORIDE 1000 ML: 0.9 INJECTION, SOLUTION INTRAVENOUS at 15:51

## 2021-08-25 RX ADMIN — LORAZEPAM 1 MG: 0.5 TABLET ORAL at 19:55

## 2021-08-25 RX ADMIN — AMLODIPINE BESYLATE 5 MG: 5 TABLET ORAL at 19:59

## 2021-08-25 RX ADMIN — METRONIDAZOLE 500 MG: 500 INJECTION, SOLUTION INTRAVENOUS at 19:55

## 2021-08-25 RX ADMIN — IOHEXOL 100 ML: 350 INJECTION, SOLUTION INTRAVENOUS at 14:48

## 2021-08-25 RX ADMIN — OLANZAPINE 5 MG: 5 TABLET, FILM COATED ORAL at 22:14

## 2021-08-25 RX ADMIN — DOCUSATE SODIUM 100 MG: 100 CAPSULE ORAL at 19:55

## 2021-08-25 RX ADMIN — CEFAZOLIN SODIUM 2000 MG: 2 SOLUTION INTRAVENOUS at 15:51

## 2021-08-25 NOTE — H&P
[facility] 1985, 29 y o  male MRN: [id number]  Unit/Bed#Eligio Harrison Encounter: [id number]  Primary Care Provider: Leonarda Gomez PA-C   Date and time admitted to hospital: 8/25/2021 12:11 PM    Colitis, acute  Assessment & Plan  8/25 CT AP: Diffuse mild-moderate colonic wall thickening with scattered air-fluid levels throughout suggesting colitis  Several mildly prominent nonspecific mesenteric lymph nodes, particularly in the right lower quadrant which are presumably reactive, possibly related to colitis  Given Ancef in the ED  As episodes of diarrhea are not frequent will not add stool studies or C diff at this time  Will continue Flagyl and begin ceftriaxone starting tomorrow  Can resume diet as tolerated    GERD (gastroesophageal reflux disease)  Assessment & Plan  Continue Protonix while inpatient, home regimen is omeprazole    Benign essential hypertension  Assessment & Plan  Continue PTA amlodipine    Anxiety  Assessment & Plan  Continue PTA Ativan    Constipation  Assessment & Plan  Continue PTA Colace    Bipolar 1 disorder (HCC)  Assessment & Plan  Continue PTA Depakote, lithium, and Zyprexa      Diet:  Regular  DVT PPx:  SCDs  Code Status:  Full code  Disposition: This patient requires observation level care  History of Present Illness     HPI:  Shravan Adler is a 29 y o  male who presents with caregiver  Caregiver states that patient has been off since Saturday  She states that patient was complaining of abdominal pain and seemed a little more weak  He had decreased appetite along with occasional nausea  Denies vomiting  She had states that last night patient had a large volume episode of diarrhea  She states that the stool was nonbloody did not notice any mucus or foul smell at that time  She states that this was a 1 time episode and has not recurred since    She denies fever and states that patient is the only 1 besides caregivers living in the home  ED Management:  Ancef  CT abdomen pelvis    Review of Systems   Constitutional: Positive for appetite change and fatigue  Negative for chills and fever  Respiratory: Negative for shortness of breath  Gastrointestinal: Positive for abdominal pain, diarrhea and nausea  Negative for blood in stool and vomiting  Neurological: Positive for weakness  Historical Information   Past Medical History:   Diagnosis Date    ADHD (attention deficit hyperactivity disorder)     Atypical pervasive developmental disorder     Autism     Bipolar disorder ([facility] )     Constipation     Depression     Head-banging     Last Assessed:  9/1/17    Hydronephrosis 9/19/2019    Hyperlipidemia     Hypertension     Intermittent explosive disorder     Oppositional defiant disorder     Personality disorder ([facility] )     Schizophrenia ([facility] )     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[address] )     Seizures ([place] [address] )     Sleep disorder     Vitamin D insufficiency     Last Assessed:  6/22/17     Past Surgical History:   Procedure Laterality Date    NO PAST SURGERIES      UMBILICAL HERNIA REPAIR       Social History   Social History     Substance and Sexual Activity   Alcohol Use No     Social History     Substance and Sexual Activity   Drug Use No     Social History     Tobacco Use   Smoking Status Never Smoker   Smokeless Tobacco Never Used     Family History: non-contributory    Meds/Allergies   all medications and allergies reviewed  No Known Allergies    Objective   Vitals: Blood pressure 124/89, pulse 102, temperature 98 3 °F (36 8 °C), temperature source Oral, resp  rate 18, height 5' 9" (1 753 m), weight 102 kg (225 lb 5 oz), SpO2 99 %  No intake or output data in the 24 hours ending 08/25/21 1649    Invasive Devices     Peripheral Intravenous Line            Peripheral IV 08/25/21 Right Antecubital <1 day                Physical Exam  Constitutional:       Appearance: Normal appearance     HENT: Head: Normocephalic and atraumatic  Right Ear: External ear normal       Left Ear: External ear normal       Nose: Nose normal       Mouth/Throat:      Pharynx: Oropharynx is clear  Eyes:      Conjunctiva/sclera: Conjunctivae normal    Cardiovascular:      Rate and Rhythm: Normal rate and regular rhythm  Pulses: Normal pulses  Heart sounds: Normal heart sounds  Pulmonary:      Effort: Pulmonary effort is normal  No respiratory distress  Breath sounds: Normal breath sounds  Abdominal:      Comments: Patient refused abdominal exam   Musculoskeletal:      Right lower leg: No edema  Left lower leg: No edema  Skin:     General: Skin is warm  Neurological:      Mental Status: He is alert  Lab Results:   I have personally reviewed pertinent reports        Recent Results (from the past 24 hour(s))   ECG 12 lead    Collection Time: 08/25/21 12:51 PM   Result Value Ref Range    Ventricular Rate 101 BPM    Atrial Rate 101 BPM    RI Interval 166 ms    QRSD Interval 142 ms    QT Interval 374 ms    QTC Interval 484 ms    P [place] 26 degrees    QRS Axis 95 degrees    T Wave Axis 5 degrees   CBC and differential    Collection Time: 08/25/21 12:56 PM   Result Value Ref Range    WBC 12 16 (H) 4 31 - 10 16 Thousand/uL    RBC 5 24 3 88 - 5 62 Million/uL    Hemoglobin 12 0 12 0 - 17 0 g/dL    Hematocrit 41 2 36 5 - 49 3 %    MCV 79 (L) 82 - 98 fL    MCH 22 9 (L) 26 8 - 34 3 pg    MCHC 29 1 (L) 31 4 - 37 4 g/dL    RDW 16 2 (H) 11 6 - 15 1 %    MPV 11 5 8 9 - 12 7 fL    Platelets 600 305 - 329 Thousands/uL    nRBC 0 /100 WBCs    Neutrophils Relative 62 43 - 75 %    Immat GRANS % 0 0 - 2 %    Lymphocytes Relative 15 14 - 44 %    Monocytes Relative 21 (H) 4 - 12 %    Eosinophils Relative 1 0 - 6 %    Basophils Relative 1 0 - 1 %    Neutrophils Absolute 7 50 1 85 - 7 62 Thousands/µL    Immature Grans Absolute 0 05 0 00 - 0 20 Thousand/uL    Lymphocytes Absolute 1 78 0 60 - 4 47 Thousands/µL Monocytes Absolute 2 60 (H) 0 17 - 1 22 Thousand/µL    Eosinophils Absolute 0 17 0 00 - 0 61 Thousand/µL    Basophils Absolute 0 06 0 00 - 0 10 Thousands/µL   Comprehensive metabolic panel    Collection Time: 08/25/21 12:56 PM   Result Value Ref Range    Sodium 139 136 - 145 mmol/L    Potassium 3 9 3 5 - 5 3 mmol/L    Chloride 108 100 - 108 mmol/L    CO2 25 21 - 32 mmol/L    ANION GAP 6 4 - 13 mmol/L    BUN 13 5 - 25 mg/dL    Creatinine 0 83 0 60 - 1 30 mg/dL    Glucose 90 65 - 140 mg/dL    Calcium 8 9 8 3 - 10 1 mg/dL    Corrected Calcium 9 8 8 3 - 10 1 mg/dL    AST 35 5 - 45 U/L    ALT 58 12 - 78 U/L    Alkaline Phosphatase 56 46 - 116 U/L    Total Protein 8 0 6 4 - 8 2 g/dL    Albumin 2 9 (L) 3 5 - 5 0 g/dL    Total Bilirubin 0 35 0 20 - 1 00 mg/dL    eGFR 114 ml/min/1 73sq m   Lipase    Collection Time: 08/25/21 12:56 PM   Result Value Ref Range    Lipase 66 (L) 73 - 393 u/L   Valproic acid level, total    Collection Time: 08/25/21 12:56 PM   Result Value Ref Range    Valproic Acid, Total 32 (L) 50 - 100 ug/mL   Novel Coronavirus (Covid-19),PCR [facility] - 2 Hour Stat    Collection Time: 08/25/21 12:56 PM    Specimen: Nose; Nares   Result Value Ref Range    SARS-CoV-2 Negative Negative   Troponin I    Collection Time: 08/25/21  2:21 PM   Result Value Ref Range    Troponin I <0 02 <=0 04 ng/mL   UA w Reflex to Microscopic w Reflex to Culture    Collection Time: 08/25/21  2:39 PM    Specimen: Urine, Clean Catch   Result Value Ref Range    Color, UA Yellow     Clarity, UA Clear     Specific Gravity, UA 1 022 1 003 - 1 030    pH, UA 6 5 4 5, 5 0, 5 5, 6 0, 6 5, 7 0, 7 5, 8 0    Leukocytes, UA Negative Negative    Nitrite, UA Negative Negative    Protein, UA Negative Negative mg/dl    Glucose, UA Negative Negative mg/dl    Ketones, UA Negative Negative mg/dl    Urobilinogen, UA 0 2 0 2, 1 0 E U /dl E U /dl    Bilirubin, UA Negative Negative    Blood, UA Negative Negative     Urinalysis:   Lab Results   Component Value Date    COLORU Yellow 08/25/2021    CLARITYU Clear 08/25/2021    SPECGRAV 1 022 08/25/2021    PHUR 6 5 08/25/2021    PHUR 6 0 08/29/2017    LEUKOCYTESUR Negative 08/25/2021    NITRITE Negative 08/25/2021    GLUCOSEU Negative 08/25/2021    KETONESU Negative 08/25/2021    BILIRUBINUR Negative 08/25/2021    BLOODU Negative 08/25/2021       Imaging:   CT abdomen pelvis with contrast   Final Result by Latha Hardin MD (08/25 1530)         1  Diffuse mild-moderate colonic wall thickening with scattered air-fluid levels throughout suggesting colitis  2   Several mildly prominent nonspecific mesenteric lymph nodes, particularly in the right lower quadrant which are presumably reactive, possibly related to colitis  3   Additional findings as noted  The study was marked in [facility] for immediate notification  Workstation performed: [id number]         XR chest 2 views   Final Result by Radha Olvera MD (08/25 1285)   Poor inspiration with prominent markings   No acute cardiopulmonary disease  Workstation performed: [id number]             Code Status: Level 1 - Full Code  POLST:    VTE Prophylaxis: None  Admission Status: OBSERVATION    Counseling / Coordination of Care  Total floor / unit time spent today 30 minutes  Greater than 50% of total time was spent with the patient and / or family counseling and / or coordination of care  Sanya Campbell MD  [facility] 26, PGY-3  8/25/2021 4:49 PM    Please be aware that this note contains text that was dictated and there may be errors pertaining to "sound-alike "words during the dictation process

## 2021-08-25 NOTE — ED PROVIDER NOTES
History  Chief Complaint   Patient presents with    Abdominal Pain     Pt c/o epigastric pain since , diarrhea last night  30 yo M PMHx of intermittent explosive disorder, Bipolar 1, Schizophrenia, cognitive disability presents from his group home with home manager and  c/o abdominal pain and change in behavior  History provided largely by home manager and   Accordingly, Emelyn Restrepo has been complaining of epigastric abdominal pain since about   They endorse he has had some decreased PO intake of food, eating only a few bites of his meals and refusing more  They state he has been more tired than usual, and not as interactive  Emelyn Restrepo has also had one episode of diarrhea that he was nearly incontinent with  Care takers describe that he had a single episode of explosive diarrhea this morning and he was unable to get to the bathroom quick enough  The diarrhea was non-bloody  He has not been on antibiotics recently  Care takers state he often drinks a large amount of milk, up to 3 gallons of diluted milk every day  He also drinks diluted juices and other fluids  They state that the milk is properly refrigerated, not  and he has not eaten anything out of the ordinary as far as they know  They deny patient having any episodes of emesis  He has no cough, no shortness of breath, no chest pain, no fevers or chills  No headache or blurry vision  He urinary frequency, urgency, or dysuria  He is vaccinated against covid as is everyone in his group home  His surgical hx includes an appendectomy years ago  Prior to Admission Medications   Prescriptions Last Dose Informant Patient Reported? Taking?    Benzoyl Peroxide (benzoyl peroxide) 10 % LIQD   No No   Sig: Used to wash/face/ shoulders/ back daily as needed (acne)   Camphor-Eucalyptus-Menthol (Vicks VapoRub) 4 7-1 2-2 6 % OINT  Care Giver No No   Sig: Apply topically 2 (two) times a day as needed (PRN)   Colace 100 MG capsule Care Giver No No   Sig: TAKE 1 CAPSULE BY MOUTH 2 TIMES DAILY AT 8A-5P (CONSTIPATION) *HOLD FOR LOOSE STOOLS   L-Methylfolate-Algae (DEPLIN 15) 15-90 314 MG CAPS  Care Giver Yes No   Sig: Take 15 mg by mouth daily Take 1 tablet by mouth once daily at 8AM   LORazepam (ATIVAN) 0 5 mg tablet  Care Giver Yes No   Sig: Take 1 mg by mouth 2 (two) times a day    OLANZapine (ZyPREXA) 20 MG tablet  Care Giver No No   Sig: Take 0 5 tablet (10 mg) by mouth twice daily at 12 pm and 1 5 tab(15mg)  8 pm   SODIUM FLUORIDE, DENTAL RINSE, (Listerine Smart Rinse) 0 02 % SOLN  Care Giver No No   Sig: RINSE MOUTH 2X DAILY AS NEEDED FOR DENTAL CARE   Sodium Fluoride (PreviDent 5000 Booster Plus) 1 1 % PSTE  Care Giver No No   Sig: Use a pea-sized amount to brush at bedtime at 8:00 p m     Sulfacetamide Sodium, Acne, 10 % LOTN   No No   Sig: Apply topically 2 (two) times a day   Sunscreens (Sunblock SPF30) LOTN  Care Giver No No   Sig: Apply every 2 hours up to five times daily   acetaminophen (TYLENOL) 325 mg tablet  Care Giver No No   Sig: Take 1 tablet (325 mg total) by mouth every 4 (four) hours as needed for mild pain or fever   amLODIPine (NORVASC) 5 mg tablet  Care Giver No No   Sig: TAKE 1 TABLET BY MOUTH ONCE DAILY AT 8AM (HTN)*BROCK   ammonium lactate (LAC-HYDRIN) 12 % lotion   No No   Sig: Apply topically to affected area on skin twice daily as needed for eczema   bismuth subsalicylate (PEPTO BISMOL) 524 mg/30 mL oral suspension   No No   Sig: Take 15 mL (262 mg total) by mouth every 6 (six) hours as needed for indigestion or diarrhea   cetirizine (ZyrTEC) 5 MG chewable tablet  Care Giver No No   Sig: Chew 1 tablet (5 mg total) daily at bedtime as needed for allergies   dextromethorphan-guaiFENesin (ROBITUSSIN DM)  mg/5 mL syrup  Care Giver No No   Sig: Take 5 mL by mouth 3 (three) times a day as needed for cough or congestion   divalproex sodium (DEPAKOTE ER) 500 mg 24 hr tablet  Care Giver No No   Sig: Take 3 tablets (1500 mg total) by mouth once daily at 5 pm   fluticasone (FLONASE) 50 mcg/act nasal spray  Care Giver No No   Si spray into each nostril daily   gabapentin (NEURONTIN) 800 mg tablet  Care Giver No No   Sig: Take 1 tablet (800 mg total) by mouth 3 times daily at 8 am, 4 pm, and 8 pm   ibuprofen (MOTRIN) 600 mg tablet  Care Giver No No   Sig: Take 1 tablet (600 mg total) by mouth every 6 (six) hours as needed for mild pain, moderate pain, fever or headaches   lanolin-mineral oil (BABY OIL) OIL  Care Giver No No   Sig: Apply topically as needed for dry skin   lithium carbonate (LITHOBID) 450 mg CR tablet  Care Giver No No   Sig: Take 2 tablets (900 mg total) by mouth daily at 5:30 pm   neomycin-bacitracin-polymyxin b (NEOSPORIN) ointment  Care Giver No No   Sig: Apply BID PRN for wound care   omeprazole (PriLOSEC) 20 mg delayed release capsule  Care Giver No No   Sig: TAKE 1 CAPSULE BY MOUTH ONCE DAILY AT 7AM (GERD) *TERRAZAS   phenol (CHLORASEPTIC) 1 4 % mucosal liquid  Care Giver No No   Sig: Apply 1 spray to the mouth or throat every 2 (two) hours as needed (sore throat)   pyrithione zinc (HEAD AND SHOULDERS) 1 % shampoo   No No   Sig: Apply topically daily as needed for dandruff      Facility-Administered Medications: None       Past Medical History:   Diagnosis Date    ADHD (attention deficit hyperactivity disorder)     Atypical pervasive developmental disorder     Autism     Bipolar disorder (HCC)     Constipation     Depression     Head-banging     Last Assessed:  17    Hydronephrosis 2019    Hyperlipidemia     Hypertension     Intermittent explosive disorder     Oppositional defiant disorder     [facility] [address] [facility]     Schizophrenia [facility] [address] [facility]     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[facility] [address] [facility]     Seizures ([facility] [address] [facility]     Sleep disorder     Vitamin D insufficiency     Last Assessed:  17       Past Surgical History:   Procedure Laterality Date    NO PAST SURGERIES      UMBILICAL HERNIA REPAIR         History reviewed  No pertinent family history  I have reviewed and agree with the history as documented  E-Cigarette/Vaping    E-Cigarette Use Never User      E-Cigarette/Vaping Substances    Nicotine No     THC No     CBD No     Flavoring No     Other No     Unknown No      Social History     Tobacco Use    Smoking status: Never Smoker    Smokeless tobacco: Never Used   Vaping Use    Vaping Use: Never used   Substance Use Topics    Alcohol use: No    Drug use: No        Review of Systems   Constitutional: Positive for activity change  Negative for chills and fever  HENT: Negative for congestion, ear pain, mouth sores, rhinorrhea and sore throat  Eyes: Negative for pain and visual disturbance  Respiratory: Negative for cough, chest tightness and shortness of breath  Cardiovascular: Negative for chest pain and palpitations  Gastrointestinal: Positive for abdominal pain and diarrhea  Negative for constipation, nausea and vomiting  Endocrine: Negative for polydipsia and polyuria  Genitourinary: Negative for dysuria, flank pain, hematuria and urgency  Musculoskeletal: Negative for arthralgias and back pain  Skin: Negative for color change and rash  Neurological: Negative for seizures, syncope, light-headedness and headaches  Psychiatric/Behavioral: Positive for behavioral problems  All other systems reviewed and are negative        Physical Exam  ED Triage Vitals   Temperature Pulse Respirations Blood Pressure SpO2   08/25/21 1213 08/25/21 1213 08/25/21 1213 08/25/21 1213 08/25/21 1213   98 3 °F (36 8 °C) 105 18 130/86 98 %      Temp Source Heart Rate Source Patient Position - Orthostatic VS BP Location FiO2 (%)   08/25/21 1213 08/25/21 1213 08/25/21 1213 08/25/21 1213 --   Oral Monitor Sitting Left arm       Pain Score       08/25/21 1556       4             Orthostatic Vital Signs  Vitals:    08/25/21 1213 08/25/21 1556   BP: 130/86 124/89   Pulse: 105 102 Patient Position - Orthostatic VS: Sitting Sitting       Physical Exam  Vitals and nursing note reviewed  Constitutional:       Appearance: He is well-developed  He is not toxic-appearing or diaphoretic  HENT:      Head: Normocephalic and atraumatic  Mouth/Throat:      Mouth: Mucous membranes are moist       Pharynx: Oropharynx is clear  Eyes:      Conjunctiva/sclera: Conjunctivae normal       Pupils: Pupils are equal, round, and reactive to light  Cardiovascular:      Rate and Rhythm: Regular rhythm  Tachycardia present  Heart sounds: Normal heart sounds  No murmur heard  Pulmonary:      Effort: Pulmonary effort is normal  No respiratory distress  Breath sounds: Normal breath sounds  No wheezing or rales  Abdominal:      General: Bowel sounds are normal  There is no distension  Palpations: Abdomen is soft  There is no mass  Tenderness: There is no abdominal tenderness  There is no right CVA tenderness, left CVA tenderness, guarding or rebound  Hernia: No hernia is present  Musculoskeletal:      Cervical back: Neck supple  Skin:     General: Skin is warm and dry  Capillary Refill: Capillary refill takes less than 2 seconds  Neurological:      Mental Status: He is alert           ED Medications  Medications   sodium chloride 0 9 % bolus 1,000 mL (1,000 mL Intravenous New Bag 8/25/21 1551)   metroNIDAZOLE (FLAGYL) IVPB (premix) 500 mg 100 mL (has no administration in time range)   iohexol (OMNIPAQUE) 350 MG/ML injection (SINGLE-DOSE) 100 mL (100 mL Intravenous Given 8/25/21 1448)   ceFAZolin (ANCEF) IVPB (premix in dextrose) 2,000 mg 50 mL (2,000 mg Intravenous New Bag 8/25/21 1551)       Diagnostic Studies  Results Reviewed     Procedure Component Value Units Date/Time    UA w Reflex to Microscopic w Reflex to Culture [[id number]] Collected: 08/25/21 1439    Lab Status: Final result Specimen: Urine, Clean Catch Updated: 08/25/21 1533     Color, UA Yellow Clarity, UA Clear     Specific Gravity, UA 1 022     pH, UA 6 5     Leukocytes, UA Negative     Nitrite, UA Negative     Protein, UA Negative mg/dl      Glucose, UA Negative mg/dl      Ketones, UA Negative mg/dl      Urobilinogen, UA 0 2 E U /dl      Bilirubin, UA Negative     Blood, UA Negative    Troponin I [[id number]]  (Normal) Collected: 08/25/21 1421    Lab Status: Final result Specimen: Blood from Arm, Right Updated: 08/25/21 1454     Troponin I <0 02 ng/mL     Novel Coronavirus (Covid-19),PCR [facility] - 2 Hour Stat [[id number]]  (Normal) Collected: 08/25/21 1256    Lab Status: Final result Specimen: Nares from Nose Updated: 08/25/21 1444     SARS-CoV-2 Negative    Narrative: The specimen collection materials, transport medium, and/or testing methodology utilized in the production of these test results have been proven to be reliable in a limited validation with an abbreviated program under the Emergency Utilization Authorization provided by the FDA  Testing reported as "Presumptive positive" will be confirmed with secondary testing to ensure result accuracy  Clinical caution and judgement should be used with the interpretation of these results with consideration of the clinical impression and other laboratory testing  Testing reported as "Positive" or "Negative" has been proven to be accurate according to standard laboratory validation requirements  All testing is performed with control materials showing appropriate reactivity at standard intervals        Lipase [[id number]]  (Abnormal) Collected: 08/25/21 1256    Lab Status: Final result Specimen: Blood from Arm, Right Updated: 08/25/21 1332     Lipase 66 u/L     Comprehensive metabolic panel [[id number]]  (Abnormal) Collected: 08/25/21 1256    Lab Status: Final result Specimen: Blood from Arm, Right Updated: 08/25/21 1332     Sodium 139 mmol/L      Potassium 3 9 mmol/L      Chloride 108 mmol/L      CO2 25 mmol/L      ANION GAP 6 mmol/L      BUN 13 mg/dL Creatinine 0 83 mg/dL      Glucose 90 mg/dL      Calcium 8 9 mg/dL      Corrected Calcium 9 8 mg/dL      AST 35 U/L      ALT 58 U/L      Alkaline Phosphatase 56 U/L      Total Protein 8 0 g/dL      Albumin 2 9 g/dL      Total Bilirubin 0 35 mg/dL      eGFR 114 ml/min/1 73sq m     Narrative:      [place] guidelines for Chronic Kidney Disease (CKD):     Stage 1 with normal or high GFR (GFR > 90 mL/min/1 73 square meters)    Stage 2 Mild CKD (GFR = 60-89 mL/min/1 73 square meters)    Stage 3A Moderate CKD (GFR = 45-59 mL/min/1 73 square meters)    Stage 3B Moderate CKD (GFR = 30-44 mL/min/1 73 square meters)    Stage 4 Severe CKD (GFR = 15-29 mL/min/1 73 square meters)    Stage 5 End Stage CKD (GFR <15 mL/min/1 73 square meters)  Note: GFR calculation is accurate only with a steady state creatinine    Valproic acid level, total [[id number]]  (Abnormal) Collected: 08/25/21 1256    Lab Status: Final result Specimen: Blood from Arm, Right Updated: 08/25/21 1332     Valproic Acid, Total 32 ug/mL     CBC and differential [[id number]]  (Abnormal) Collected: 08/25/21 1256    Lab Status: Final result Specimen: Blood from Arm, Right Updated: 08/25/21 1320     WBC 12 16 Thousand/uL      RBC 5 24 Million/uL      Hemoglobin 12 0 g/dL      Hematocrit 41 2 %      MCV 79 fL      MCH 22 9 pg      MCHC 29 1 g/dL      RDW 16 2 %      MPV 11 5 fL      Platelets 771 Thousands/uL      nRBC 0 /100 WBCs      Neutrophils Relative 62 %      Immat GRANS % 0 %      Lymphocytes Relative 15 %      Monocytes Relative 21 %      Eosinophils Relative 1 %      Basophils Relative 1 %      Neutrophils Absolute 7 50 Thousands/µL      Immature Grans Absolute 0 05 Thousand/uL      Lymphocytes Absolute 1 78 Thousands/µL      Monocytes Absolute 2 60 Thousand/µL      Eosinophils Absolute 0 17 Thousand/µL      Basophils Absolute 0 06 Thousands/µL                  CT abdomen pelvis with contrast   Final Result by Mino Hamm Ariana Whitt MD (08/25 1530)         1  Diffuse mild-moderate colonic wall thickening with scattered air-fluid levels throughout suggesting colitis  2   Several mildly prominent nonspecific mesenteric lymph nodes, particularly in the right lower quadrant which are presumably reactive, possibly related to colitis  3   Additional findings as noted  The study was marked in [facility] for immediate notification  Workstation performed: [id number]         XR chest 2 views   Final Result by Kareem Glover MD (08/25 6055)   Poor inspiration with prominent markings   No acute cardiopulmonary disease  Workstation performed: [id number]               Procedures  ECG 12 Lead Documentation Only    Date/Time: 8/25/2021 1:45 PM  Performed by: Lai Wagner MD  Authorized by: Lai Wagner MD     ECG reviewed by me, the ED Provider: yes    Patient location:  ED  Previous ECG:     Previous ECG:  Compared to current    Similarity:  Changes noted    Comparison to cardiac monitor: No    Interpretation:     Interpretation: abnormal    Rate:     ECG rate assessment: tachycardic    Rhythm:     Rhythm: sinus tachycardia    Ectopy:     Ectopy: none    QRS:     QRS axis:  Normal  Conduction:     Conduction: normal    ST segments:     ST segments:  Normal  T waves:     T waves: inverted    Q waves:     Q waves:  III, V2 and V3  Comments:      Right bundle branch block, more pronounced on this ecg than on old in 2016  ED Course  ED Course as of Aug 25 1638   Wed Aug 25, 2021   1327 Chest X-ray: no obvious consolidation, effusion, or cardiopulmonary abnormality  1428 CT head wo contrast   1533 CT findings:   1  Diffuse mild-moderate colonic wall thickening with scattered air-fluid levels throughout suggesting colitis  2   Several mildly prominent nonspecific mesenteric lymph nodes, particularly in the right lower quadrant which are presumably reactive, possibly related to colitis        [address] [place] [address] Avenue Family medicine to discuss admission  MDM  Number of Diagnoses or Management Options  Colitis  Epigastric pain  Diagnosis management comments: 30 yo M PMHx of intermittent explosive disorder, Bipolar 1, Schizophrenia, cognitive disability presents from his group home with home manager and  c/o abdominal pain and change in behavior  DDx: gastritis, gastroenteritis, colitis, UTI, pancreatitis, pneumonia, ACS, toxic ingestion of prescribed medications  CBC, CMP, lipase, troponin all within normal limits  Depakote level low at 32  Covid negative  Chest X-ray within normal limits  CT A/P with IV contrast showed CT findings:   1  Diffuse mild-moderate colonic wall thickening with scattered air-fluid levels throughout suggesting colitis  2   Several mildly prominent nonspecific mesenteric lymph nodes, particularly in the right lower quadrant which are presumably reactive, possibly related to colitis  Incidental findings of 4 mm calcified granuloma identified in the lateral left lung base  And one or more sharply circumscribed subcentimeter renal hypodensities are present, too small to accurately characterize, and statistically most likely benign findings  According to recent literature (Radiology 2019) no further workup of these findings is recommended  Patient's caretakers informed and told that this information is available in patient's chart for review with his PCP  Patient treated with IV antibiotics: metronidazole and ancef  Case discussed with family medicine, patient admitted for IV antibiotics and observation          Disposition  Final diagnoses:   Colitis   Epigastric pain     Time reflects when diagnosis was documented in both MDM as applicable and the Disposition within this note     Time User Action Codes Description Comment    8/25/2021  4:24 PM Theora Collet Add [K52 9] Colitis     8/25/2021  4:24 PM Theora Collet Add [R10 13] Epigastric pain       ED Disposition     ED Disposition Condition Date/Time Comment    Admit Stable Wed Aug 25, 2021  4:28 PM Case was discussed with Dr Rk Russ and the patient's admission status was agreed to be Admission Status: observation status to the service of Dr Rk Russ  Follow-up Information    None         Patient's Medications   Discharge Prescriptions    No medications on file     No discharge procedures on file  PDMP Review     None           ED Provider  Attending physically available and evaluated Nuria Mariam ALMONTE managed the patient along with the ED Attending      Electronically Signed by         Selina Cabrera MD  08/25/21 9826

## 2021-08-25 NOTE — ED ATTENDING ATTESTATION
8/25/2021  I, Leilani Scanlon MD, saw and evaluated the patient  I have discussed the patient with the resident/non-physician practitioner and agree with the resident's/non-physician practitioner's findings, Plan of Care, and MDM as documented in the resident's/non-physician practitioner's note, except where noted  All available labs and Radiology studies were reviewed  I was present for key portions of any procedure(s) performed by the resident/non-physician practitioner and I was immediately available to provide assistance  At this point I agree with the current assessment done in the Emergency Department  I have conducted an independent evaluation of this patient a history and physical is as follows:    OA: 28 y/o m with significant psychiatric disease, currently lives in a group home who presents with epigastric/abdominal pain since Sunday with decreased appetite, one episode of large diarrhea and decreased activity/increased fatigue  Tolerating fluid, not eating solids, no vomiting  No report of cough, URI sxms  No report of chest pain or SOB  Covid vaccinated  PE, NAD, mildly dry MM, clear sclera/conjunctiva, neck supple/FROM, RR, lungs CTAB, -w/r, abd soft, + ttp over epigastric region, b/l upper quadrants, - r/g, + BS, -LE edema/-calf ttp, + 2 distal pulses and capillary refill < 2 sec, AAO, a/p abd pain, diarrhea and decreased oral intake  eval with labs, imaging, given location of discomfort, obtain EKG, treat accordingly       ED Course         Critical Care Time  Procedures

## 2021-08-25 NOTE — Clinical Note
Case was discussed with   and the patient's admission status was agreed to be Admission Status: observation status to the service of

## 2021-08-25 NOTE — ED NOTES
Pt ambulated to restroom with caregiver x2  Unable to provide a sample at this time        Burgess Dulce RN  08/25/21 1420

## 2021-08-25 NOTE — DISCHARGE INSTRUCTIONS
Incidental finding on CT: One or more sharply circumscribed subcentimeter renal hypodensities are present, too small to accurately characterize, and statistically most likely benign findings  According to recent literature (Radiology 2019) no further workup of   these findings is recommended    Colitis   WHAT YOU NEED TO KNOW:   Colitis is swelling and irritation of your colon  Colitis may be caused by ulcers or a problem with your immune system  Bacteria, a virus, or a parasite may also cause colitis  The cause may not be known  You may have diarrhea, abdominal pain, fever, or blood or mucus in your bowel movement  DISCHARGE INSTRUCTIONS:   Return to the emergency department if:   · You have sudden trouble breathing  · Your bowel movements are black or have blood in them  · You have blood in your vomit  · You have severe abdominal pain or your abdomen is swollen and feels hard  · You have any of the following signs of dehydration:     ? Dizziness or weakness    ? Dry mouth, cracked lips, or severe thirst    ? Fast heartbeat or breathing    ? Urinating very little or not at all    Call your doctor if:   · Your symptoms get worse or do not go away  · You have a fever, chills, cough, or feel weak and achy  · You suddenly lose weight without trying  · You have questions or concerns about your condition or care  Medicines:   · Medicines  may be given to decrease inflammation in your colon and treat diarrhea  · Take your medicine as directed  Contact your healthcare provider if you think your medicine is not helping or if you have side effects  Tell him of her if you are allergic to any medicine  Keep a list of the medicines, vitamins, and herbs you take  Include the amounts, and when and why you take them  Bring the list or the pill bottles to follow-up visits  Carry your medicine list with you in case of an emergency      Manage your symptoms:   · Drink liquids as directed to help prevent dehydration  Good liquids to drink include water, juice, and broth  Ask how much liquid to drink each day  You may need to drink an oral rehydration solution (ORS)  An ORS contains a balance of water, salt, and sugar to replace body fluids lost during diarrhea  · Eat a variety of healthy foods  Healthy foods include fruits, vegetables, whole-grain breads, beans, low-fat dairy products, lean meats, and fish  You may need to eat several small meals throughout the day instead of large meals  Avoid spicy foods, caffeine, chocolate, and foods high in fat  · Talk to your healthcare provider before you take NSAIDs  NSAIDs can cause worsen your symptoms if ulcers are causing your colitis  · Start to exercise when you feel better  Regular exercise helps your bowels work normally  Ask about the best exercise plan for you  Prevent the spread of germs:       · Wash your hands often  Wash your hands several times each day  Wash after you use the bathroom, change a child's diaper, and before you prepare or eat food  Use soap and water every time  Rub your soapy hands together, lacing your fingers  Wash the front and back of your hands, and in between your fingers  Use the fingers of one hand to scrub under the fingernails of the other hand  Wash for at least 20 seconds  Rinse with warm, running water for several seconds  Then dry your hands with a clean towel or paper towel  Use hand  that contains alcohol if soap and water are not available  Do not touch your eyes, nose, or mouth without washing your hands first          · Cover a sneeze or cough  Use a tissue that covers your mouth and nose  Throw the tissue away in a trash can right away  Use the bend of your arm if a tissue is not available  Wash your hands well with soap and water or use a hand   · Clean surfaces often  Clean doorknobs, countertops, cell phones, and other surfaces that are touched often   Use a disinfecting wipe, a single-use sponge, or a cloth you can wash and reuse  Use disinfecting  if you do not have wipes  You can create a disinfecting  by mixing 1 part bleach with 10 parts water  · Ask about vaccines you may need  Vaccines help prevent disease caused by some viruses and bacteria  Get the influenza (flu) vaccine as soon as recommended each year  The flu vaccine is usually available starting in September or October  Flu viruses change, so it is important to get a flu vaccine every year  Get the pneumonia vaccine if recommended  This vaccine is usually recommended every 5 years  Your provider will tell you when to get this vaccine, if needed  Your healthcare provider can tell you if you should get other vaccines, and when to get them  Follow up with your doctor as directed: You may need to return for a colonoscopy or other tests  Write down how often you have a bowel movements and what they look like  Bring this to your follow-up visits  Write down your questions so you remember to ask them during your visits  © Copyright [employer] 2021 Information is for End User's use only and may not be sold, redistributed or otherwise used for commercial purposes  All illustrations and images included in CareNotes® are the copyrighted property of [employer]  or Keli Alford   The above information is an  only  It is not intended as medical advice for individual conditions or treatments  Talk to your doctor, nurse or pharmacist before following any medical regimen to see if it is safe and effective for you

## 2021-08-26 LAB
ANION GAP SERPL CALCULATED.3IONS-SCNC: 4 MMOL/L (ref 4–13)
BASOPHILS # BLD AUTO: 0.06 THOUSANDS/ΜL (ref 0–0.1)
BASOPHILS NFR BLD AUTO: 1 % (ref 0–1)
BUN SERPL-MCNC: 8 MG/DL (ref 5–25)
CALCIUM SERPL-MCNC: 9 MG/DL (ref 8.3–10.1)
CHLORIDE SERPL-SCNC: 108 MMOL/L (ref 100–108)
CO2 SERPL-SCNC: 26 MMOL/L (ref 21–32)
CREAT SERPL-MCNC: 0.77 MG/DL (ref 0.6–1.3)
EOSINOPHIL # BLD AUTO: 0.25 THOUSAND/ΜL (ref 0–0.61)
EOSINOPHIL NFR BLD AUTO: 2 % (ref 0–6)
ERYTHROCYTE [DISTWIDTH] IN BLOOD BY AUTOMATED COUNT: 16.2 % (ref 11.6–15.1)
GFR SERPL CREATININE-BSD FRML MDRD: 118 ML/MIN/1.73SQ M
GLUCOSE SERPL-MCNC: 95 MG/DL (ref 65–140)
HCT VFR BLD AUTO: 36.1 % (ref 36.5–49.3)
HEMOCCULT STL QL: ABNORMAL
HEMOCCULT STL QL: ABNORMAL
HEMOCCULT STL QL: POSITIVE
HGB BLD-MCNC: 10.6 G/DL (ref 12–17)
IMM GRANULOCYTES # BLD AUTO: 0.09 THOUSAND/UL (ref 0–0.2)
IMM GRANULOCYTES NFR BLD AUTO: 1 % (ref 0–2)
LYMPHOCYTES # BLD AUTO: 1.85 THOUSANDS/ΜL (ref 0.6–4.47)
LYMPHOCYTES NFR BLD AUTO: 18 % (ref 14–44)
MCH RBC QN AUTO: 23 PG (ref 26.8–34.3)
MCHC RBC AUTO-ENTMCNC: 29.4 G/DL (ref 31.4–37.4)
MCV RBC AUTO: 78 FL (ref 82–98)
MONOCYTES # BLD AUTO: 2.27 THOUSAND/ΜL (ref 0.17–1.22)
MONOCYTES NFR BLD AUTO: 22 % (ref 4–12)
NEUTROPHILS # BLD AUTO: 5.69 THOUSANDS/ΜL (ref 1.85–7.62)
NEUTS SEG NFR BLD AUTO: 56 % (ref 43–75)
NRBC BLD AUTO-RTO: 0 /100 WBCS
PLATELET # BLD AUTO: 241 THOUSANDS/UL (ref 149–390)
PMV BLD AUTO: 12 FL (ref 8.9–12.7)
POTASSIUM SERPL-SCNC: 3.7 MMOL/L (ref 3.5–5.3)
RBC # BLD AUTO: 4.61 MILLION/UL (ref 3.88–5.62)
SODIUM SERPL-SCNC: 138 MMOL/L (ref 136–145)
WBC # BLD AUTO: 10.21 THOUSAND/UL (ref 4.31–10.16)

## 2021-08-26 PROCEDURE — 36415 COLL VENOUS BLD VENIPUNCTURE: CPT

## 2021-08-26 PROCEDURE — 82272 OCCULT BLD FECES 1-3 TESTS: CPT | Performed by: FAMILY MEDICINE

## 2021-08-26 PROCEDURE — 99218 PR INITIAL OBSERVATION CARE/DAY 30 MINUTES: CPT | Performed by: FAMILY MEDICINE

## 2021-08-26 PROCEDURE — 80048 BASIC METABOLIC PNL TOTAL CA: CPT

## 2021-08-26 PROCEDURE — NC001 PR NO CHARGE: Performed by: FAMILY MEDICINE

## 2021-08-26 PROCEDURE — 85025 COMPLETE CBC W/AUTO DIFF WBC: CPT

## 2021-08-26 RX ADMIN — LORAZEPAM 1 MG: 0.5 TABLET ORAL at 08:33

## 2021-08-26 RX ADMIN — DIVALPROEX SODIUM 500 MG: 500 TABLET, EXTENDED RELEASE ORAL at 20:29

## 2021-08-26 RX ADMIN — OLANZAPINE 5 MG: 5 TABLET, FILM COATED ORAL at 20:29

## 2021-08-26 RX ADMIN — GABAPENTIN 800 MG: 400 CAPSULE ORAL at 20:29

## 2021-08-26 RX ADMIN — AMLODIPINE BESYLATE 5 MG: 5 TABLET ORAL at 08:28

## 2021-08-26 RX ADMIN — DOCUSATE SODIUM 100 MG: 100 CAPSULE ORAL at 17:19

## 2021-08-26 RX ADMIN — LITHIUM CARBONATE 900 MG: 450 TABLET ORAL at 20:28

## 2021-08-26 RX ADMIN — LORAZEPAM 1 MG: 0.5 TABLET ORAL at 17:19

## 2021-08-26 RX ADMIN — CEFTRIAXONE 1000 MG: 1 INJECTION, POWDER, FOR SOLUTION INTRAMUSCULAR; INTRAVENOUS at 05:23

## 2021-08-26 RX ADMIN — GABAPENTIN 800 MG: 400 CAPSULE ORAL at 08:28

## 2021-08-26 RX ADMIN — METRONIDAZOLE 500 MG: 500 INJECTION, SOLUTION INTRAVENOUS at 17:20

## 2021-08-26 RX ADMIN — PANTOPRAZOLE SODIUM 40 MG: 40 TABLET, DELAYED RELEASE ORAL at 05:23

## 2021-08-26 RX ADMIN — METRONIDAZOLE 500 MG: 500 INJECTION, SOLUTION INTRAVENOUS at 08:33

## 2021-08-26 RX ADMIN — GABAPENTIN 800 MG: 400 CAPSULE ORAL at 17:19

## 2021-08-26 NOTE — PLAN OF CARE
Problem: MOBILITY - ADULT  Goal: Maintain or return to baseline ADL function  Description: INTERVENTIONS:  -  Assess patient's ability to carry out ADLs; assess patient's baseline for ADL function and identify physical deficits which impact ability to perform ADLs (bathing, care of mouth/teeth, toileting, grooming, dressing, etc )  - Assess/evaluate cause of self-care deficits   - Assess range of motion  - Assess patient's mobility; develop plan if impaired  - Assess patient's need for assistive devices and provide as appropriate  - Encourage maximum independence but intervene and supervise when necessary  - Involve family in performance of ADLs  - Assess for home care needs following discharge   - Consider OT consult to assist with ADL evaluation and planning for discharge  - Provide patient education as appropriate  Outcome: Progressing  Goal: Maintains/Returns to pre admission functional level  Description: INTERVENTIONS:  - Perform BMAT or MOVE assessment daily    - Set and communicate daily mobility goal to care team and patient/family/caregiver  - Collaborate with rehabilitation services on mobility goals if consulted  - Perform Range of Motion 3 times a day  - Reposition patient every 3 hours    - Dangle patient 3 times a day  - Stand patient 3 times a day  - Ambulate patient 3 times a day  - Out of bed to chair 3 times a day   - Out of bed for meals 3 times a day  - Out of bed for toileting  - Record patient progress and toleration of activity level   Outcome: Progressing     Problem: Potential for Falls  Goal: Patient will remain free of falls  Description: INTERVENTIONS:  - Educate patient/family on patient safety including physical limitations  - Instruct patient to call for assistance with activity   - Consult OT/PT to assist with strengthening/mobility   - Keep Call bell within reach  - Keep bed low and locked with side rails adjusted as appropriate  - Keep care items and personal belongings within reach  - Initiate and maintain comfort rounds  - Make Fall Risk Sign visible to staff  - Offer Toileting every 2 Hours, in advance of need  - Initiate/Maintain bedalarm  - Obtain necessary fall risk management equipment:   - Apply yellow socks and bracelet for high fall risk patients  - Consider moving patient to room near nurses station  Outcome: Progressing     Problem: PAIN - ADULT  Goal: Verbalizes/displays adequate comfort level or baseline comfort level  Description: Interventions:  - Encourage patient to monitor pain and request assistance  - Assess pain using appropriate pain scale  - Administer analgesics based on type and severity of pain and evaluate response  - Implement non-pharmacological measures as appropriate and evaluate response  - Consider cultural and social influences on pain and pain management  - Notify physician/advanced practitioner if interventions unsuccessful or patient reports new pain  Outcome: Progressing     Problem: SAFETY ADULT  Goal: Maintain or return to baseline ADL function  Description: INTERVENTIONS:  -  Assess patient's ability to carry out ADLs; assess patient's baseline for ADL function and identify physical deficits which impact ability to perform ADLs (bathing, care of mouth/teeth, toileting, grooming, dressing, etc )  - Assess/evaluate cause of self-care deficits   - Assess range of motion  - Assess patient's mobility; develop plan if impaired  - Assess patient's need for assistive devices and provide as appropriate  - Encourage maximum independence but intervene and supervise when necessary  - Involve family in performance of ADLs  - Assess for home care needs following discharge   - Consider OT consult to assist with ADL evaluation and planning for discharge  - Provide patient education as appropriate  Outcome: Progressing  Goal: Maintains/Returns to pre admission functional level  Description: INTERVENTIONS:  - Perform BMAT or MOVE assessment daily    - Set and communicate daily mobility goal to care team and patient/family/caregiver  - Collaborate with rehabilitation services on mobility goals if consulted  - Perform Range of Motion  times a day  - Reposition patient every  hours    - Dangle patient  times a day  - Stand patient  times a day  - Ambulate patient  times a day  - Out of bed to chair  times a day   - Out of bed for meals  times a day  - Out of bed for toileting  - Record patient progress and toleration of activity level   Outcome: Progressing  Goal: Patient will remain free of falls  Description: INTERVENTIONS:  - Educate patient/family on patient safety including physical limitations  - Instruct patient to call for assistance with activity   - Consult OT/PT to assist with strengthening/mobility   - Keep Call bell within reach  - Keep bed low and locked with side rails adjusted as appropriate  - Keep care items and personal belongings within reach  - Initiate and maintain comfort rounds  - Make Fall Risk Sign visible to staff  - Offer Toileting every  Hours, in advance of need  - Initiate/Maintain alarm  - Obtain necessary fall risk management equipment:   - Apply yellow socks and bracelet for high fall risk patients  - Consider moving patient to room near nurses station  Outcome: Progressing     Problem: DISCHARGE PLANNING  Goal: Discharge to home or other facility with appropriate resources  Description: INTERVENTIONS:  - Identify barriers to discharge w/patient and caregiver  - Arrange for needed discharge resources and transportation as appropriate  - Identify discharge learning needs (meds, wound care, etc )  - Arrange for interpretive services to assist at discharge as needed  - Refer to Case Management Department for coordinating discharge planning if the patient needs post-hospital services based on physician/advanced practitioner order or complex needs related to functional status, cognitive ability, or social support system  Outcome: Progressing Problem: Knowledge Deficit  Goal: Patient/family/caregiver demonstrates understanding of disease process, treatment plan, medications, and discharge instructions  Description: Complete learning assessment and assess knowledge base    Interventions:  - Provide teaching at level of understanding  - Provide teaching via preferred learning methods  Outcome: Progressing

## 2021-08-26 NOTE — PROGRESS NOTES
[address] [facility]  Progress Note - Nuria Becerra 1985, 29 y o  male MRN: [id number]  Unit/Bed#: -01 Encounter: [id number]  Primary Care Provider: Goldy Whitt PA-C   Date and time admitted to hospital: 8/25/2021 12:11 PM    GERD (gastroesophageal reflux disease)  Assessment & Plan  Continue Protonix while inpatient, home regimen is omeprazole    Benign essential hypertension  Assessment & Plan  Continue PTA amlodipine    Anxiety  Assessment & Plan  Continue PTA Ativan    Constipation  Assessment & Plan  Continue PTA Colace    Bipolar 1 disorder (HCC)  Assessment & Plan  Continue PTA Depakote, lithium, and Zyprexa    * Colitis, acute  Assessment & Plan  Status uncertain as patient failed to show stool to nurse r caregivers before flushing  - Will continue Flagyl and give another dose of ceftriaxone  - Continue regular diet  - Will monitor overnight with proper observation of stools        VTE Pharmacologic Prophylaxis: None  VTE Mechanical Prophylaxis: sequential compression device  Diet: Regular Diet  Code Status: Full Code  Disposition: Anticipated dispo to home tomorrow    Discussed with attending physician, Dr Camacho, and [facility] team     Subjective:   OVN, caregiver states that pt went to bathroom 11 times  They are unsure if he had stools each time, but one time the caregiver did see that there was blood on the toilet seat  Pt flushes stools before they can be seen, and nurse was not made aware either  Objective:     Vitals: Blood pressure (!) 142/112, pulse 84, temperature 98 3 °F (36 8 °C), temperature source Oral, resp  rate 18, height 5' 9" (1 753 m), weight 102 kg (225 lb 5 oz), SpO2 99 %  ,Body mass index is 33 27 kg/m²  Intake/Output Summary (Last 24 hours) at 8/26/2021 1159  Last data filed at 8/26/2021 0729  Gross per 24 hour   Intake 200 ml   Output --   Net 200 ml       Physical Exam  Constitutional:       Appearance: Normal appearance  HENT:      Head: Normocephalic and atraumatic  Nose: Nose normal       Mouth/Throat:      Mouth: Mucous membranes are moist    Eyes:      General: No scleral icterus  Cardiovascular:      Rate and Rhythm: Normal rate and regular rhythm  Pulses: Normal pulses  Heart sounds: Normal heart sounds  Pulmonary:      Effort: Pulmonary effort is normal       Breath sounds: Normal breath sounds  Abdominal:      General: Bowel sounds are normal       Palpations: Abdomen is soft  Tenderness: There is no abdominal tenderness  There is no guarding  Musculoskeletal:         General: Normal range of motion  Cervical back: Normal range of motion  Skin:     General: Skin is warm and dry  Neurological:      General: No focal deficit present  Mental Status: He is alert and oriented to person, place, and time  Psychiatric:         Mood and Affect: Mood normal          Invasive Devices     Peripheral Intravenous Line            Peripheral IV 08/25/21 Right Antecubital <1 day                           Lab and other studies:  I have personally reviewed pertinent reports       Admission on 08/25/2021   Component Date Value    WBC 08/25/2021 12 16*    RBC 08/25/2021 5 24     Hemoglobin 08/25/2021 12 0     Hematocrit 08/25/2021 41 2     MCV 08/25/2021 79*    MCH 08/25/2021 22 9*    MCHC 08/25/2021 29 1*    RDW 08/25/2021 16 2*    MPV 08/25/2021 11 5     Platelets 22/59/9978 251     nRBC 08/25/2021 0     Neutrophils Relative 08/25/2021 62     Immat GRANS % 08/25/2021 0     Lymphocytes Relative 08/25/2021 15     Monocytes Relative 08/25/2021 21*    Eosinophils Relative 08/25/2021 1     Basophils Relative 08/25/2021 1     Neutrophils Absolute 08/25/2021 7 50     Immature Grans Absolute 08/25/2021 0 05     Lymphocytes Absolute 08/25/2021 1 78     Monocytes Absolute 08/25/2021 2 60*    Eosinophils Absolute 08/25/2021 0 17     Basophils Absolute 08/25/2021 0 06     Sodium 08/25/2021 139     Potassium 08/25/2021 3 9     Chloride 08/25/2021 108     CO2 08/25/2021 25     ANION GAP 08/25/2021 6     BUN 08/25/2021 13     Creatinine 08/25/2021 0 83     Glucose 08/25/2021 90     Calcium 08/25/2021 8 9     Corrected Calcium 08/25/2021 9 8     AST 08/25/2021 35     ALT 08/25/2021 58     Alkaline Phosphatase 08/25/2021 56     Total Protein 08/25/2021 8 0     Albumin 08/25/2021 2 9*    Total Bilirubin 08/25/2021 0 35     eGFR 08/25/2021 114     Lipase 08/25/2021 66*    Valproic Acid, Total 08/25/2021 32*    SARS-CoV-2 08/25/2021 Negative     Ventricular Rate 08/25/2021 101     Atrial Rate 08/25/2021 101     LA Interval 08/25/2021 166     QRSD Interval 08/25/2021 142     QT Interval 08/25/2021 374     QTC Interval 08/25/2021 484     P Axis 08/25/2021 26     QRS Axis 08/25/2021 95     T Wave Axis 08/25/2021 5     Troponin I 08/25/2021 <0 02     Color, UA 08/25/2021 Yellow     Clarity, UA 08/25/2021 Clear     Specific Gravity, UA 08/25/2021 1 022     pH, UA 08/25/2021 6 5     Leukocytes, UA 08/25/2021 Negative     Nitrite, UA 08/25/2021 Negative     Protein, UA 08/25/2021 Negative     Glucose, UA 08/25/2021 Negative     Ketones, UA 08/25/2021 Negative     Urobilinogen, UA 08/25/2021 0 2     Bilirubin, UA 08/25/2021 Negative     Blood, UA 08/25/2021 Negative     WBC 08/26/2021 10 21*    RBC 08/26/2021 4 61     Hemoglobin 08/26/2021 10 6*    Hematocrit 08/26/2021 36 1*    MCV 08/26/2021 78*    MCH 08/26/2021 23 0*    MCHC 08/26/2021 29 4*    RDW 08/26/2021 16 2*    MPV 08/26/2021 12 0     Platelets 83/18/7644 241     nRBC 08/26/2021 0     Neutrophils Relative 08/26/2021 56     Immat GRANS % 08/26/2021 1     Lymphocytes Relative 08/26/2021 18     Monocytes Relative 08/26/2021 22*    Eosinophils Relative 08/26/2021 2     Basophils Relative 08/26/2021 1     Neutrophils Absolute 08/26/2021 5 69     Immature Grans Absolute 08/26/2021 0 09     Lymphocytes Absolute 08/26/2021 1 85     Monocytes Absolute 08/26/2021 2 27*    Eosinophils Absolute 08/26/2021 0 25     Basophils Absolute 08/26/2021 0 06        Recent Results (from the past 24 hour(s))   ECG 12 lead    Collection Time: 08/25/21 12:51 PM   Result Value Ref Range    Ventricular Rate 101 BPM    Atrial Rate 101 BPM    ME Interval 166 ms    QRSD Interval 142 ms    QT Interval 374 ms    QTC Interval 484 ms    P [place] 26 degrees    QRS Axis 95 degrees    T Wave Axis 5 degrees   CBC and differential    Collection Time: 08/25/21 12:56 PM   Result Value Ref Range    WBC 12 16 (H) 4 31 - 10 16 Thousand/uL    RBC 5 24 3 88 - 5 62 Million/uL    Hemoglobin 12 0 12 0 - 17 0 g/dL    Hematocrit 41 2 36 5 - 49 3 %    MCV 79 (L) 82 - 98 fL    MCH 22 9 (L) 26 8 - 34 3 pg    MCHC 29 1 (L) 31 4 - 37 4 g/dL    RDW 16 2 (H) 11 6 - 15 1 %    MPV 11 5 8 9 - 12 7 fL    Platelets 341 711 - 928 Thousands/uL    nRBC 0 /100 WBCs    Neutrophils Relative 62 43 - 75 %    Immat GRANS % 0 0 - 2 %    Lymphocytes Relative 15 14 - 44 %    Monocytes Relative 21 (H) 4 - 12 %    Eosinophils Relative 1 0 - 6 %    Basophils Relative 1 0 - 1 %    Neutrophils Absolute 7 50 1 85 - 7 62 Thousands/µL    Immature Grans Absolute 0 05 0 00 - 0 20 Thousand/uL    Lymphocytes Absolute 1 78 0 60 - 4 47 Thousands/µL    Monocytes Absolute 2 60 (H) 0 17 - 1 22 Thousand/µL    Eosinophils Absolute 0 17 0 00 - 0 61 Thousand/µL    Basophils Absolute 0 06 0 00 - 0 10 Thousands/µL   Comprehensive metabolic panel    Collection Time: 08/25/21 12:56 PM   Result Value Ref Range    Sodium 139 136 - 145 mmol/L    Potassium 3 9 3 5 - 5 3 mmol/L    Chloride 108 100 - 108 mmol/L    CO2 25 21 - 32 mmol/L    ANION GAP 6 4 - 13 mmol/L    BUN 13 5 - 25 mg/dL    Creatinine 0 83 0 60 - 1 30 mg/dL    Glucose 90 65 - 140 mg/dL    Calcium 8 9 8 3 - 10 1 mg/dL    Corrected Calcium 9 8 8 3 - 10 1 mg/dL    AST 35 5 - 45 U/L    ALT 58 12 - 78 U/L    Alkaline Phosphatase 56 46 - 116 U/L    Total Protein 8 0 6 4 - 8 2 g/dL    Albumin 2 9 (L) 3 5 - 5 0 g/dL    Total Bilirubin 0 35 0 20 - 1 00 mg/dL    eGFR 114 ml/min/1 73sq m   Lipase    Collection Time: 08/25/21 12:56 PM   Result Value Ref Range    Lipase 66 (L) 73 - 393 u/L   Valproic acid level, total    Collection Time: 08/25/21 12:56 PM   Result Value Ref Range    Valproic Acid, Total 32 (L) 50 - 100 ug/mL   Novel Coronavirus (Covid-19),PCR [facility] - 2 Hour Stat    Collection Time: 08/25/21 12:56 PM    Specimen: Nose; Nares   Result Value Ref Range    SARS-CoV-2 Negative Negative   Troponin I    Collection Time: 08/25/21  2:21 PM   Result Value Ref Range    Troponin I <0 02 <=0 04 ng/mL   UA w Reflex to Microscopic w Reflex to Culture    Collection Time: 08/25/21  2:39 PM    Specimen: Urine, Clean Catch   Result Value Ref Range    Color, UA Yellow     Clarity, UA Clear     Specific Gravity, UA 1 022 1 003 - 1 030    pH, UA 6 5 4 5, 5 0, 5 5, 6 0, 6 5, 7 0, 7 5, 8 0    Leukocytes, UA Negative Negative    Nitrite, UA Negative Negative    Protein, UA Negative Negative mg/dl    Glucose, UA Negative Negative mg/dl    Ketones, UA Negative Negative mg/dl    Urobilinogen, UA 0 2 0 2, 1 0 E U /dl E U /dl    Bilirubin, UA Negative Negative    Blood, UA Negative Negative   CBC and differential    Collection Time: 08/26/21  7:36 AM   Result Value Ref Range    WBC 10 21 (H) 4 31 - 10 16 Thousand/uL    RBC 4 61 3 88 - 5 62 Million/uL    Hemoglobin 10 6 (L) 12 0 - 17 0 g/dL    Hematocrit 36 1 (L) 36 5 - 49 3 %    MCV 78 (L) 82 - 98 fL    MCH 23 0 (L) 26 8 - 34 3 pg    MCHC 29 4 (L) 31 4 - 37 4 g/dL    RDW 16 2 (H) 11 6 - 15 1 %    MPV 12 0 8 9 - 12 7 fL    Platelets 545 929 - 814 Thousands/uL    nRBC 0 /100 WBCs    Neutrophils Relative 56 43 - 75 %    Immat GRANS % 1 0 - 2 %    Lymphocytes Relative 18 14 - 44 %    Monocytes Relative 22 (H) 4 - 12 %    Eosinophils Relative 2 0 - 6 %    Basophils Relative 1 0 - 1 %    Neutrophils Absolute 5 69 1 85 - 7 62 Thousands/µL    Immature Grans Absolute 0 09 0 00 - 0 20 Thousand/uL    Lymphocytes Absolute 1 85 0 60 - 4 47 Thousands/µL    Monocytes Absolute 2 27 (H) 0 17 - 1 22 Thousand/µL    Eosinophils Absolute 0 25 0 00 - 0 61 Thousand/µL    Basophils Absolute 0 06 0 00 - 0 10 Thousands/µL     Blood Culture: No results found for: BLOODCX,   Urinalysis:   Lab Results   Component Value Date    COLORU Yellow 08/25/2021    CLARITYU Clear 08/25/2021    SPECGRAV 1 022 08/25/2021    PHUR 6 5 08/25/2021    PHUR 6 0 08/29/2017    LEUKOCYTESUR Negative 08/25/2021    NITRITE Negative 08/25/2021    GLUCOSEU Negative 08/25/2021    KETONESU Negative 08/25/2021    BILIRUBINUR Negative 08/25/2021    BLOODU Negative 08/25/2021   ,       Imaging:   XR chest 2 views    Result Date: 8/25/2021  Poor inspiration with prominent markings No acute cardiopulmonary disease  Workstation performed: [id number]     CT abdomen pelvis with contrast    Result Date: 8/25/2021  1  Diffuse mild-moderate colonic wall thickening with scattered air-fluid levels throughout suggesting colitis  2   Several mildly prominent nonspecific mesenteric lymph nodes, particularly in the right lower quadrant which are presumably reactive, possibly related to colitis  3   Additional findings as noted  The study was marked in [facility] for immediate notification   Workstation performed: [id number]        Current Facility-Administered Medications   Medication Dose Route Frequency    acetaminophen (TYLENOL) tablet 325 mg  325 mg Oral Q4H PRN    amLODIPine (NORVASC) tablet 5 mg  5 mg Oral Daily    bismuth subsalicylate (PEPTO BISMOL) oral suspension 262 mg  262 mg Oral Q6H PRN    ceftriaxone (ROCEPHIN) 1 g/50 mL in dextrose IVPB  1,000 mg Intravenous Q24H    dextromethorphan-guaiFENesin (ROBITUSSIN DM) oral syrup 5 mL  5 mL Oral TID PRN    divalproex sodium (DEPAKOTE ER) 24 hr tablet 500 mg  500 mg Oral Daily    docusate sodium (COLACE) capsule 100 mg  100 mg Oral BID    fluticasone (FLONASE) 50 mcg/act nasal spray 1 spray  1 spray Nasal Daily    gabapentin (NEURONTIN) capsule 800 mg  800 mg Oral TID    ibuprofen (MOTRIN) tablet 600 mg  600 mg Oral Q6H PRN    lithium carbonate (LITHOBID) CR tablet 900 mg  900 mg Oral Daily    loratadine (CLARITIN) tablet 5 mg  5 mg Oral Daily    LORazepam (ATIVAN) tablet 1 mg  1 mg Oral BID    metroNIDAZOLE (FLAGYL) IVPB (premix) 500 mg 100 mL  500 mg Intravenous Q8H    OLANZapine (ZyPREXA) tablet 5 mg  5 mg Oral HS    pantoprazole (PROTONIX) EC tablet 40 mg  40 mg Oral Early Morning    phenol (CHLORASEPTIC) 1 4 % mucosal liquid 1 spray  1 spray Mouth/Throat Q2H PRN             Titus Gonzalez DO PGY-1  Family Medicine

## 2021-08-26 NOTE — UTILIZATION REVIEW
Initial Clinical Review    Admission: Date/Time/Statement:   Admission Orders (From admission, onward)     Ordered        08/25/21 1629  Place in Observation  Once         08/25/21 1628  Place in Observation  Once                   Orders Placed This Encounter   Procedures    Place in Observation     Standing Status:   Standing     Number of Occurrences:   1     Order Specific Question:   Level of Care     Answer:   Med Surg [16]    Place in Observation     Standing Status:   Standing     Number of Occurrences:   1     Order Specific Question:   Level of Care     Answer:   Med Surg [16]     ED Arrival Information     Expected Arrival Acuity    - 8/25/2021 11:41 Urgent         Means of arrival Escorted by Service Admission type    Walk-In Self General Medicine Urgent         Arrival complaint    Diarrhea/Weakness        Chief Complaint   Patient presents with    Abdominal Pain     Pt c/o epigastric pain since Sunday, diarrhea last night  Initial Presentation:     29year old male presents to ed from home for epigastric pain with diarrhea  Clinical assessment significant for wbc 12 16, imaging shows colitis  Treated in ed with iv ns bolus, iv ancef, po protonix, iv flagyl, iv ceftriaxone  Date: 8-26-21  Day 2: observation  Abdominal pain improved  Tolerating diet  Care giver reports patient had frequent trips to bathroom  overnight however bowel movements not witnessed by anyone else  Patient is afebrile  Continue to monitor bowel movements  Continue iv antibiotics  Stool studies sent  Repeat BMP  Keep stool chart       ED Triage Vitals   08/25/21 1213 08/25/21 1213 08/25/21 1213 08/25/21 1213 08/25/21 1213   98 3 °F (36 8 °C) 105 18 130/86 98 %      Oral Monitor         Pain Score       4          08/25/21 102 kg (225 lb 5 oz)     Additional Vital Signs:     Date/Time  Temp  Pulse  Resp  BP  MAP   SpO2  O2 Device   08/26/21 13:13:51  98 9 °F (37 2°C)  83  16  127/89  102  99 %  None (Room air)   08/26/21 1313  --  --  --  --  --  97 %  None (Room air)   08/26/21 0800  --  84  18  142/112  Abnormal   124  99 %  --   08/26/21 0225  --  94  18  129/81  --  97 %  None (Room air)   08/25/21 2015  --  102  18  140/78  --  97 %  None (Room air)   08/25/21 1556  --  102  18  124/89  --  99 %  None (Room air)             Pertinent Labs/Diagnostic Test Results:     Collection Time Result Time Vent R Atrial R AK Int  QRSD Int  QT Int  QTC Int  P Axis QRS Axis T Wave Ax    08/25/21 12:51:22 08/25/21 13:33:58 101 101 166 142 374 484 26 95 5                     Sinus tachycardia   Right bundle branch block   Abnormal ECG   When compared with ECG of 11-MAY-2016 14:20,   Right bundle branch block is now Present                     CT abdomen pelvis with contrast   Final  (08/25 1530)         1  Diffuse mild-moderate colonic wall thickening with scattered air-fluid levels throughout suggesting colitis  2   Several mildly prominent nonspecific mesenteric lymph nodes, particularly in the right lower quadrant which are presumably reactive, possibly related to colitis  3   Additional findings as noted  XR chest 2 views   Final  (08/25 1548)   Poor inspiration with prominent markings   No acute cardiopulmonary disease          Results from last 7 days   Lab Units 08/25/21  1256   SARS-COV-2  Negative     Results from last 7 days   Lab Units 08/26/21  0736 08/25/21  1256   WBC Thousand/uL 10 21* 12 16*   HEMOGLOBIN g/dL 10 6* 12 0   HEMATOCRIT % 36 1* 41 2   PLATELETS Thousands/uL 241 251   NEUTROS ABS Thousands/µL 5 69 7 50         Results from last 7 days   Lab Units 08/26/21  1308 08/25/21  1256   SODIUM mmol/L 138 139   POTASSIUM mmol/L 3 7 3 9   CHLORIDE mmol/L 108 108   CO2 mmol/L 26 25   ANION GAP mmol/L 4 6   BUN mg/dL 8 13   CREATININE mg/dL 0 77 0 83   EGFR ml/min/1 73sq m 118 114   CALCIUM mg/dL 9 0 8 9     Results from last 7 days   Lab Units 08/25/21  1256   AST U/L 35   ALT U/L 58   ALK PHOS U/L 56   TOTAL PROTEIN g/dL 8 0   ALBUMIN g/dL 2 9*   TOTAL BILIRUBIN mg/dL 0 35         Results from last 7 days   Lab Units 08/26/21  1308 08/25/21  1256   GLUCOSE RANDOM mg/dL 95 90         Results from last 7 days   Lab Units 08/25/21  1421   TROPONIN I ng/mL <0 02         Results from last 7 days   Lab Units 08/25/21  1256   LIPASE u/L 66*         Results from last 7 days   Lab Units 08/25/21  1439   CLARITY UA  Clear   COLOR UA  Yellow   SPEC GRAV UA  1 022   PH UA  6 5   GLUCOSE UA mg/dl Negative   KETONES UA mg/dl Negative   BLOOD UA  Negative   PROTEIN UA mg/dl Negative   NITRITE UA  Negative   BILIRUBIN UA  Negative   UROBILINOGEN UA E U /dl 0 2   LEUKOCYTES UA  Negative       ED Treatment:   Medication Administration from 08/25/2021 1141 to 08/26/2021 1101       Date/Time Order Dose Route Action     08/25/2021 1551 sodium chloride 0 9 % bolus 1,000 mL 1,000 mL Intravenous New Bag     08/25/2021 1551 ceFAZolin (ANCEF) IVPB (premix in dextrose) 2,000 mg 50 mL 2,000 mg Intravenous New Bag     08/26/2021 0828 gabapentin (NEURONTIN) capsule 800 mg 800 mg Oral Given     08/26/2021 0833 LORazepam (ATIVAN) tablet 1 mg 1 mg Oral Given     08/25/2021 1955 LORazepam (ATIVAN) tablet 1 mg 1 mg Oral Given     08/25/2021 2214 OLANZapine (ZyPREXA) tablet 5 mg 5 mg Oral Given     08/26/2021 0828 amLODIPine (NORVASC) tablet 5 mg 5 mg Oral Given     08/25/2021 1959 amLODIPine (NORVASC) tablet 5 mg 5 mg Oral Given     08/26/2021 0523 pantoprazole (PROTONIX) EC tablet 40 mg 40 mg Oral Given     08/25/2021 1955 docusate sodium (COLACE) capsule 100 mg 100 mg Oral Given     08/26/2021 0833 metroNIDAZOLE (FLAGYL) IVPB (premix) 500 mg 100 mL 500 mg Intravenous New Bag     08/25/2021 1955 metroNIDAZOLE (FLAGYL) IVPB (premix) 500 mg 100 mL 500 mg Intravenous New Bag     08/26/2021 0523 ceftriaxone (ROCEPHIN) 1 g/50 mL in dextrose IVPB 1,000 mg Intravenous New Bag        Past Medical History:   Diagnosis Date    ADHD (attention deficit hyperactivity disorder)     Atypical pervasive developmental disorder     Autism     Bipolar disorder ([place] [address] )     Constipation     Depression     Head-banging     Last Assessed:  9/1/17    Hydronephrosis 9/19/2019    Hyperlipidemia     Hypertension     Intermittent explosive disorder     Oppositional defiant disorder     Personality disorder ([place] [address] )     Schizophrenia ([place] [address] )     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[place] [address] )     Seizures ([place] [address] )     Sleep disorder     Vitamin D insufficiency     Last Assessed:  6/22/17     Present on Admission:   Bipolar 1 disorder ([place] [address] )   Constipation   Benign essential hypertension   GERD (gastroesophageal reflux disease)   Anxiety      Admitting Diagnosis:     Colitis [K52 9]  Epigastric pain [R10 13]  Weakness [R53 1]    Age/Sex: 29 y o  male    Scheduled Medications:    amLODIPine, 5 mg, Oral, Daily  cefTRIAXone, 1,000 mg, Intravenous, Q24H  divalproex sodium, 500 mg, Oral, Daily  docusate sodium, 100 mg, Oral, BID  fluticasone, 1 spray, Nasal, Daily  gabapentin, 800 mg, Oral, TID  lithium carbonate, 900 mg, Oral, Daily  loratadine, 5 mg, Oral, Daily  LORazepam, 1 mg, Oral, BID  metroNIDAZOLE, 500 mg, Intravenous, Q8H  OLANZapine, 5 mg, Oral, HS  pantoprazole, 40 mg, Oral, Early Morning      Continuous IV Infusions:     PRN Meds:  acetaminophen, 325 mg, Oral, Q4H PRN  bismuth subsalicylate, 041 mg, Oral, Q6H PRN  dextromethorphan-guaiFENesin, 5 mL, Oral, TID PRN  ibuprofen, 600 mg, Oral, Q6H PRN  phenol, 1 spray, Mouth/Throat, Q2H PRN        None    Network Utilization Review Department  ATTENTION: Please call with any questions or concerns to [phone] and carefully listen to the prompts so that you are directed to the right person   All voicemails are confidential   Kristina Query all requests for admission clinical reviews, approved or denied determinations and any other requests to dedicated fax number below belonging to the campus where the patient is receiving treatment   List of dedicated fax numbers for the Facilities:  [address] [place] [address] DENIALS (Administrative/Medical Necessity) [phone]   [address]  (Maternity/NICU/Pediatrics) [phone]   [address] [facility] [address] Vinny Lacey [address] [address] [place] [address] [address] [facility] [address] [facility] [phone]

## 2021-08-26 NOTE — QUICK NOTE
Heme occult blood test performed at bedside with pt consent  Pt tolerated it well, and sample was sent

## 2021-08-27 PROBLEM — K62.5 RECTAL BLEEDING: Status: ACTIVE | Noted: 2021-08-27

## 2021-08-27 LAB
ANION GAP SERPL CALCULATED.3IONS-SCNC: 4 MMOL/L (ref 4–13)
BUN SERPL-MCNC: 9 MG/DL (ref 5–25)
CALCIUM SERPL-MCNC: 8.8 MG/DL (ref 8.3–10.1)
CHLORIDE SERPL-SCNC: 111 MMOL/L (ref 100–108)
CO2 SERPL-SCNC: 27 MMOL/L (ref 21–32)
CREAT SERPL-MCNC: 0.74 MG/DL (ref 0.6–1.3)
ERYTHROCYTE [DISTWIDTH] IN BLOOD BY AUTOMATED COUNT: 16.2 % (ref 11.6–15.1)
GFR SERPL CREATININE-BSD FRML MDRD: 120 ML/MIN/1.73SQ M
GLUCOSE SERPL-MCNC: 103 MG/DL (ref 65–140)
HCT VFR BLD AUTO: 37.6 % (ref 36.5–49.3)
HGB BLD-MCNC: 10.9 G/DL (ref 12–17)
MCH RBC QN AUTO: 23.1 PG (ref 26.8–34.3)
MCHC RBC AUTO-ENTMCNC: 29 G/DL (ref 31.4–37.4)
MCV RBC AUTO: 80 FL (ref 82–98)
PLATELET # BLD AUTO: 254 THOUSANDS/UL (ref 149–390)
PMV BLD AUTO: 11.7 FL (ref 8.9–12.7)
POTASSIUM SERPL-SCNC: 4 MMOL/L (ref 3.5–5.3)
RBC # BLD AUTO: 4.72 MILLION/UL (ref 3.88–5.62)
SODIUM SERPL-SCNC: 142 MMOL/L (ref 136–145)
WBC # BLD AUTO: 10.33 THOUSAND/UL (ref 4.31–10.16)

## 2021-08-27 PROCEDURE — 99254 IP/OBS CNSLTJ NEW/EST MOD 60: CPT | Performed by: INTERNAL MEDICINE

## 2021-08-27 PROCEDURE — 85027 COMPLETE CBC AUTOMATED: CPT | Performed by: FAMILY MEDICINE

## 2021-08-27 PROCEDURE — 80048 BASIC METABOLIC PNL TOTAL CA: CPT

## 2021-08-27 RX ORDER — SODIUM CHLORIDE 9 MG/ML
75 INJECTION, SOLUTION INTRAVENOUS CONTINUOUS
Status: DISCONTINUED | OUTPATIENT
Start: 2021-08-27 | End: 2021-08-30 | Stop reason: HOSPADM

## 2021-08-27 RX ADMIN — DOCUSATE SODIUM 100 MG: 100 CAPSULE ORAL at 17:34

## 2021-08-27 RX ADMIN — METRONIDAZOLE 500 MG: 500 INJECTION, SOLUTION INTRAVENOUS at 08:25

## 2021-08-27 RX ADMIN — AMLODIPINE BESYLATE 5 MG: 5 TABLET ORAL at 08:24

## 2021-08-27 RX ADMIN — LORAZEPAM 1 MG: 0.5 TABLET ORAL at 11:41

## 2021-08-27 RX ADMIN — METRONIDAZOLE 500 MG: 500 INJECTION, SOLUTION INTRAVENOUS at 01:25

## 2021-08-27 RX ADMIN — DOCUSATE SODIUM 100 MG: 100 CAPSULE ORAL at 08:24

## 2021-08-27 RX ADMIN — LORATADINE 5 MG: 10 TABLET ORAL at 08:23

## 2021-08-27 RX ADMIN — GABAPENTIN 800 MG: 400 CAPSULE ORAL at 22:57

## 2021-08-27 RX ADMIN — CEFTRIAXONE 1000 MG: 1 INJECTION, POWDER, FOR SOLUTION INTRAMUSCULAR; INTRAVENOUS at 05:33

## 2021-08-27 RX ADMIN — DIVALPROEX SODIUM 500 MG: 500 TABLET, EXTENDED RELEASE ORAL at 16:14

## 2021-08-27 RX ADMIN — METRONIDAZOLE 500 MG: 500 INJECTION, SOLUTION INTRAVENOUS at 16:16

## 2021-08-27 RX ADMIN — OLANZAPINE 5 MG: 5 TABLET, FILM COATED ORAL at 22:57

## 2021-08-27 RX ADMIN — LITHIUM CARBONATE 900 MG: 450 TABLET ORAL at 17:34

## 2021-08-27 RX ADMIN — GABAPENTIN 800 MG: 400 CAPSULE ORAL at 17:34

## 2021-08-27 RX ADMIN — GABAPENTIN 800 MG: 400 CAPSULE ORAL at 08:23

## 2021-08-27 RX ADMIN — SODIUM CHLORIDE 75 ML/HR: 0.9 INJECTION, SOLUTION INTRAVENOUS at 12:23

## 2021-08-27 RX ADMIN — LORAZEPAM 1 MG: 0.5 TABLET ORAL at 16:14

## 2021-08-27 RX ADMIN — PANTOPRAZOLE SODIUM 40 MG: 40 TABLET, DELAYED RELEASE ORAL at 05:33

## 2021-08-27 NOTE — CASE MANAGEMENT
Patient is not a 30-day readmission, nor a bundle patient   spoke with patient's caregiver via phone () to obtain psychosocial assessment and discuss discharge needs  Patient is a 81-year-old male admitted to [facility] with a diagnosis of acute colitis  Patient lives in a group home through [facility] in [facility]  Per the caregiver, patient is independent with ADL's and mobility, the staff assists as needed and does all homemaking tasks, no DME's, and no current home care services  Patient has a mental health history and is on several medications  Caregiver denies patient having a history of substance use  PCP verified as Kelli Brito  Preferred pharmacy is [address] [place] [address]  Patient is COVID vaccinated  Patient's caregiver was educated on the CM role, transportation, and the discharge process, including options such as home care versus rehab  The group home can provide transportation home for patient at time of discharge  CM will continue to follow patient to assist with discharge planning and provide supportive counseling as needed  CM reviewed d/c planning process including the following: identifying help at home, patient preference for d/c planning needs, Discharge Lounge, [facility] Meds to Bed program, availability of treatment team to discuss questions or concerns patient and/or family may have regarding understanding medications and recognizing signs and symptoms once discharged  CM also encouraged patient to follow up with all recommended appointments after discharge  Patient advised of importance for patient and family to participate in managing patients medical well being

## 2021-08-27 NOTE — CONSULTS
Consultation - [facility]  Juan C Thurmanjosie 29 y o  male MRN: [id number]  Unit/Bed#: -01 Encounter: [id number]    Reason for Consult / Principal Problem:     Diarrhea, rectal bleed    ASSESSMENT AND PLAN:      1  Diarrhea - likely acute infectious enterocolitis  Suspect self-limited illness  Lower suspicion for bacterial etiology  Possible component of IBS as well although acuity not consistent with this  Low suspicion for IBD  · Follow-up stool studies ordered by primary team   · No plan for endoscopic evaluation at this time; however, if patient's symptoms fail to improve or if there is any clinical deterioration with significant bleeding, can certainly re-evaluate for more urgent endoscopy at that time   · Hold off on antidiarrheals for now; can consider starting if stool studies are negative   · Can use Banatrol or Metamucil for stool-bulking if needed  · IV hydration and supportive care per primary team    2  Rectal bleeding - likely outlet bleeding based on description  Possibly irritation from diarrhea  Hemoglobin did decline slightly but overall appears hemodynamically stable without any recurrent bleeding at this time  · Monitor stool output   · Monitor blood counts   · Transfuse as needed for hemoglobin less than 7 and/or symptomatic anemia  · GI recommendations otherwise as noted above    3  GERD - stable  · Continue PPI daily    ______________________________________________________________________    HISTORY OF PRESENT ILLNESS:  78-year-old male with history of anxiety, bipolar 1 disorder, hypertension, GERD  Majority of information regarding HPI was obtained from review of chart and from caregiver who was present diarrhea initially, the stool was not bloody in appearance  However, he did have an episode of blood within his stool during his hospital course  GI evaluation requested due to visible blood within stool    Patient has had stool occult blood testing which was positive  Patient may have had colonoscopy in the past but the report is not available and unclear if there were any significant findings  At this time, per caregiver, patient appears to have improved  REVIEW OF SYSTEMS:    Unable to obtain  Patient with limited verbal interaction  Historical Information   Past Medical History:   Diagnosis Date    ADHD (attention deficit hyperactivity disorder)     Atypical pervasive developmental disorder     Autism     Bipolar disorder ([facility] )     Constipation     Depression     Head-banging     Last Assessed:  9/1/17    Hydronephrosis 9/19/2019    Hyperlipidemia     Hypertension     Intermittent explosive disorder     Oppositional defiant disorder     Personality disorder ([facility] )     Schizophrenia ([facility] )     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[facility] )     Seizures ([place] [id number] )     Sleep disorder     Vitamin D insufficiency     Last Assessed:  6/22/17     Past Surgical History:   Procedure Laterality Date    NO PAST SURGERIES      UMBILICAL HERNIA REPAIR       Social History   Social History     Substance and Sexual Activity   Alcohol Use No     Social History     Substance and Sexual Activity   Drug Use No     Social History     Tobacco Use   Smoking Status Never Smoker   Smokeless Tobacco Never Used     History reviewed  No pertinent family history      Meds/Allergies   Medications Prior to Admission   Medication    acetaminophen (TYLENOL) 325 mg tablet    amLODIPine (NORVASC) 5 mg tablet    ammonium lactate (LAC-HYDRIN) 12 % lotion    Benzoyl Peroxide (benzoyl peroxide) 10 % LIQD    bismuth subsalicylate (PEPTO BISMOL) 524 mg/30 mL oral suspension    Camphor-Eucalyptus-Menthol (Vicks VapoRub) 4 7-1 2-2 6 % OINT    cetirizine (ZyrTEC) 5 MG chewable tablet    Colace 100 MG capsule    dextromethorphan-guaiFENesin (ROBITUSSIN DM)  mg/5 mL syrup    divalproex sodium (DEPAKOTE ER) 500 mg 24 hr tablet    fluticasone (FLONASE) 50 mcg/act nasal spray    gabapentin (NEURONTIN) 800 mg tablet    ibuprofen (MOTRIN) 600 mg tablet    L-Methylfolate-Algae (DEPLIN 15) 15-90 314 MG CAPS    lanolin-mineral oil (BABY OIL) OIL    lithium carbonate (LITHOBID) 450 mg CR tablet    LORazepam (ATIVAN) 0 5 mg tablet    neomycin-bacitracin-polymyxin b (NEOSPORIN) ointment    OLANZapine (ZyPREXA) 20 MG tablet    omeprazole (PriLOSEC) 20 mg delayed release capsule    phenol (CHLORASEPTIC) 1 4 % mucosal liquid    pyrithione zinc (HEAD AND SHOULDERS) 1 % shampoo    Sodium Fluoride (PreviDent 5000 Booster Plus) 1 1 % PSTE    SODIUM FLUORIDE, DENTAL RINSE, (Listerine Smart Rinse) 0 02 % SOLN    Sulfacetamide Sodium, Acne, 10 % LOTN    Sunscreens (Sunblock SPF30) LOTN     Current Facility-Administered Medications   Medication Dose Route Frequency    acetaminophen (TYLENOL) tablet 325 mg  325 mg Oral Q4H PRN    amLODIPine (NORVASC) tablet 5 mg  5 mg Oral Daily    bismuth subsalicylate (PEPTO BISMOL) oral suspension 262 mg  262 mg Oral Q6H PRN    ceftriaxone (ROCEPHIN) 1 g/50 mL in dextrose IVPB  1,000 mg Intravenous Q24H    dextromethorphan-guaiFENesin (ROBITUSSIN DM) oral syrup 5 mL  5 mL Oral TID PRN    divalproex sodium (DEPAKOTE ER) 24 hr tablet 500 mg  500 mg Oral Daily    docusate sodium (COLACE) capsule 100 mg  100 mg Oral BID    fluticasone (FLONASE) 50 mcg/act nasal spray 1 spray  1 spray Nasal Daily    gabapentin (NEURONTIN) capsule 800 mg  800 mg Oral TID    ibuprofen (MOTRIN) tablet 600 mg  600 mg Oral Q6H PRN    lithium carbonate (LITHOBID) CR tablet 900 mg  900 mg Oral Daily    loratadine (CLARITIN) tablet 5 mg  5 mg Oral Daily    LORazepam (ATIVAN) tablet 1 mg  1 mg Oral BID    metroNIDAZOLE (FLAGYL) IVPB (premix) 500 mg 100 mL  500 mg Intravenous Q8H    OLANZapine (ZyPREXA) tablet 5 mg  5 mg Oral HS    pantoprazole (PROTONIX) EC tablet 40 mg  40 mg Oral Early Morning    phenol (CHLORASEPTIC) 1 4 % mucosal liquid 1 spray  1 spray Mouth/Throat Q2H PRN    sodium chloride 0 9 % infusion  75 mL/hr Intravenous Continuous     No Known Allergies      PHYSICAL EXAM:      Objective   Blood pressure 120/76, pulse 83, temperature 98 4 °F (36 9 °C), resp  rate 16, height 5' 9" (1 753 m), weight 102 kg (225 lb 5 oz), SpO2 99 %  Body mass index is 33 27 kg/m²      Intake/Output Summary (Last 24 hours) at 8/27/2021 1211  Last data filed at 8/27/2021 0751  Gross per 24 hour   Intake 236 ml   Output 1 ml   Net 235 ml       General Appearance:   Alert, no distress   HEENT:   Normocephalic, atraumatic, anicteric, strabismus     Neck:   Supple, symmetrical, trachea midline   Lungs:   Respirations unlabored    Heart:   Regular rate and rhythm   Abdomen:   Soft, non-tender, non-distended; normal bowel sounds; no masses, no organomegaly    Genitalia:   Deferred    Rectal:   Deferred    Extremities:   No cyanosis, clubbing or edema    Pulses:   2+ and symmetric all extremities    Skin:   No jaundice, rashes, or lesions        LAB RESULTS:     Admission on 08/25/2021   Component Date Value    WBC 08/25/2021 12 16*    RBC 08/25/2021 5 24     Hemoglobin 08/25/2021 12 0     Hematocrit 08/25/2021 41 2     MCV 08/25/2021 79*    MCH 08/25/2021 22 9*    MCHC 08/25/2021 29 1*    RDW 08/25/2021 16 2*    MPV 08/25/2021 11 5     Platelets 95/33/1987 251     nRBC 08/25/2021 0     Neutrophils Relative 08/25/2021 62     Immat GRANS % 08/25/2021 0     Lymphocytes Relative 08/25/2021 15     Monocytes Relative 08/25/2021 21*    Eosinophils Relative 08/25/2021 1     Basophils Relative 08/25/2021 1     Neutrophils Absolute 08/25/2021 7 50     Immature Grans Absolute 08/25/2021 0 05     Lymphocytes Absolute 08/25/2021 1 78     Monocytes Absolute 08/25/2021 2 60*    Eosinophils Absolute 08/25/2021 0 17     Basophils Absolute 08/25/2021 0 06     Sodium 08/25/2021 139     Potassium 08/25/2021 3 9     Chloride 08/25/2021 108     CO2 08/25/2021 25     ANION GAP 08/25/2021 6     BUN 08/25/2021 13     Creatinine 08/25/2021 0 83     Glucose 08/25/2021 90     Calcium 08/25/2021 8 9     Corrected Calcium 08/25/2021 9 8     AST 08/25/2021 35     ALT 08/25/2021 58     Alkaline Phosphatase 08/25/2021 56     Total Protein 08/25/2021 8 0     Albumin 08/25/2021 2 9*    Total Bilirubin 08/25/2021 0 35     eGFR 08/25/2021 114     Lipase 08/25/2021 66*    Valproic Acid, Total 08/25/2021 32*    SARS-CoV-2 08/25/2021 Negative     Ventricular Rate 08/25/2021 101     Atrial Rate 08/25/2021 101     MS Interval 08/25/2021 166     QRSD Interval 08/25/2021 142     QT Interval 08/25/2021 374     QTC Interval 08/25/2021 484     P Axis 08/25/2021 26     QRS Axis 08/25/2021 95     T Wave Axis 08/25/2021 5     Troponin I 08/25/2021 <0 02     Color, UA 08/25/2021 Yellow     Clarity, UA 08/25/2021 Clear     Specific Gravity, UA 08/25/2021 1 022     pH, UA 08/25/2021 6 5     Leukocytes, UA 08/25/2021 Negative     Nitrite, UA 08/25/2021 Negative     Protein, UA 08/25/2021 Negative     Glucose, UA 08/25/2021 Negative     Ketones, UA 08/25/2021 Negative     Urobilinogen, UA 08/25/2021 0 2     Bilirubin, UA 08/25/2021 Negative     Blood, UA 08/25/2021 Negative     WBC 08/26/2021 10 21*    RBC 08/26/2021 4 61     Hemoglobin 08/26/2021 10 6*    Hematocrit 08/26/2021 36 1*    MCV 08/26/2021 78*    MCH 08/26/2021 23 0*    MCHC 08/26/2021 29 4*    RDW 08/26/2021 16 2*    MPV 08/26/2021 12 0     Platelets 68/30/0544 241     nRBC 08/26/2021 0     Neutrophils Relative 08/26/2021 56     Immat GRANS % 08/26/2021 1     Lymphocytes Relative 08/26/2021 18     Monocytes Relative 08/26/2021 22*    Eosinophils Relative 08/26/2021 2     Basophils Relative 08/26/2021 1     Neutrophils Absolute 08/26/2021 5 69     Immature Grans Absolute 08/26/2021 0 09     Lymphocytes Absolute 08/26/2021 1 85     Monocytes Absolute 08/26/2021 2 27*    Eosinophils Absolute 08/26/2021 0 25     Basophils Absolute 08/26/2021 0 06     Sodium 08/26/2021 138     Potassium 08/26/2021 3 7     Chloride 08/26/2021 108     CO2 08/26/2021 26     ANION GAP 08/26/2021 4     BUN 08/26/2021 8     Creatinine 08/26/2021 0 77     Glucose 08/26/2021 95     Calcium 08/26/2021 9 0     eGFR 08/26/2021 118     Fecal Occult Blood Diagn* 08/26/2021 Positive*    Fecal Occult Blood Diagn* 08/26/2021 Test not performed     Fecal Occult Blood Diagn* 08/26/2021 Test not performed     Sodium 08/27/2021 142     Potassium 08/27/2021 4 0     Chloride 08/27/2021 111*    CO2 08/27/2021 27     ANION GAP 08/27/2021 4     BUN 08/27/2021 9     Creatinine 08/27/2021 0 74     Glucose 08/27/2021 103     Calcium 08/27/2021 8 8     eGFR 08/27/2021 120     WBC 08/27/2021 10 33*    RBC 08/27/2021 4 72     Hemoglobin 08/27/2021 10 9*    Hematocrit 08/27/2021 37 6     MCV 08/27/2021 80*    MCH 08/27/2021 23 1*    MCHC 08/27/2021 29 0*    RDW 08/27/2021 16 2*    Platelets 06/24/2269 254     MPV 08/27/2021 11 7        RADIOLOGY RESULTS: I have personally reviewed pertinent imaging studies  TEJAL Gar  Gastroenterology Fellow  [facility]  Available on [email or web address]

## 2021-08-27 NOTE — PROGRESS NOTES
[address] [facility]  Progress Note - Bay Adorno 1985, 29 y o  male MRN: [id number]  Unit/Bed#: -01 Encounter: [id number]  Primary Care Provider: Cathie Ortiz PA-C   Date and time admitted to hospital: 8/25/2021 12:11 PM    * Colitis, acute  Assessment & Plan  OVN, pt had 2 episodes of liquid blood per rectum  - see rectal bleeding plan  - Continue Flagyl & Ceftriaxone  - c-diff, stool culture & fecal leukocytes pending  - Consulted GI, awaiting recs    Rectal bleeding  Assessment & Plan  Acute onset  - Heme occult positive, Hgb stable on CBC  - Consulted GI, awaiting recs    Benign essential hypertension  Assessment & Plan  Continue PTA amlodipine    Bipolar 1 disorder (HCC)  Assessment & Plan  Continue PTA Depakote, lithium, and Zyprexa    Constipation  Assessment & Plan  Continue PTA Colace    Anxiety  Assessment & Plan  Continue PTA Ativan    GERD (gastroesophageal reflux disease)  Assessment & Plan  Continue Protonix while inpatient, home regimen is omeprazole        VTE Pharmacologic Prophylaxis: Reason for no pharmacologic prophylaxis active bleeding  VTE Mechanical Prophylaxis: sequential compression device  Diet: Regular Diet  Code Status: Level 1  Disposition: Continue current management as pt is still symptomatic    Discussed with attending physician, Dr Martino, and  FM team     Subjective:   OVN, pt had 2 episodes of liquid blood per rectum  C/o lower middle abdominal pain  Denies diarrhea, fever  Objective:     Vitals: Blood pressure 120/76, pulse 83, temperature 98 4 °F (36 9 °C), resp  rate 16, height 5' 9" (1 753 m), weight 102 kg (225 lb 5 oz), SpO2 99 %  ,Body mass index is 33 27 kg/m²  Intake/Output Summary (Last 24 hours) at 8/27/2021 1330  Last data filed at 8/27/2021 0751  Gross per 24 hour   Intake 236 ml   Output --   Net 236 ml       Physical Exam  Constitutional:       Appearance: Normal appearance     HENT:      Head: Normocephalic and atraumatic  Nose: Nose normal       Mouth/Throat:      Mouth: Mucous membranes are moist    Eyes:      General: No scleral icterus  Cardiovascular:      Rate and Rhythm: Normal rate and regular rhythm  Pulses: Normal pulses  Heart sounds: Normal heart sounds  Pulmonary:      Effort: Pulmonary effort is normal       Breath sounds: Normal breath sounds  Abdominal:      General: Bowel sounds are normal       Palpations: Abdomen is soft  Tenderness: There is abdominal tenderness  There is no guarding or rebound  Musculoskeletal:         General: Normal range of motion  Cervical back: Normal range of motion  Skin:     General: Skin is warm and dry  Neurological:      General: No focal deficit present  Mental Status: He is alert and oriented to person, place, and time  Psychiatric:         Mood and Affect: Mood normal          Behavior: Behavior normal          Invasive Devices     Peripheral Intravenous Line            Peripheral IV 08/27/21 Dorsal (posterior); Left Hand <1 day                           Lab and other studies:  I have personally reviewed pertinent reports       Admission on 08/25/2021   Component Date Value    WBC 08/25/2021 12 16*    RBC 08/25/2021 5 24     Hemoglobin 08/25/2021 12 0     Hematocrit 08/25/2021 41 2     MCV 08/25/2021 79*    MCH 08/25/2021 22 9*    MCHC 08/25/2021 29 1*    RDW 08/25/2021 16 2*    MPV 08/25/2021 11 5     Platelets 51/05/0562 251     nRBC 08/25/2021 0     Neutrophils Relative 08/25/2021 62     Immat GRANS % 08/25/2021 0     Lymphocytes Relative 08/25/2021 15     Monocytes Relative 08/25/2021 21*    Eosinophils Relative 08/25/2021 1     Basophils Relative 08/25/2021 1     Neutrophils Absolute 08/25/2021 7 50     Immature Grans Absolute 08/25/2021 0 05     Lymphocytes Absolute 08/25/2021 1 78     Monocytes Absolute 08/25/2021 2 60*    Eosinophils Absolute 08/25/2021 0 17     Basophils Absolute 08/25/2021 0 06  Sodium 08/25/2021 139     Potassium 08/25/2021 3 9     Chloride 08/25/2021 108     CO2 08/25/2021 25     ANION GAP 08/25/2021 6     BUN 08/25/2021 13     Creatinine 08/25/2021 0 83     Glucose 08/25/2021 90     Calcium 08/25/2021 8 9     Corrected Calcium 08/25/2021 9 8     AST 08/25/2021 35     ALT 08/25/2021 58     Alkaline Phosphatase 08/25/2021 56     Total Protein 08/25/2021 8 0     Albumin 08/25/2021 2 9*    Total Bilirubin 08/25/2021 0 35     eGFR 08/25/2021 114     Lipase 08/25/2021 66*    Valproic Acid, Total 08/25/2021 32*    SARS-CoV-2 08/25/2021 Negative     Ventricular Rate 08/25/2021 101     Atrial Rate 08/25/2021 101     NE Interval 08/25/2021 166     QRSD Interval 08/25/2021 142     QT Interval 08/25/2021 374     QTC Interval 08/25/2021 484     P Axis 08/25/2021 26     QRS Axis 08/25/2021 95     T Wave Axis 08/25/2021 5     Troponin I 08/25/2021 <0 02     Color, UA 08/25/2021 Yellow     Clarity, UA 08/25/2021 Clear     Specific Gravity, UA 08/25/2021 1 022     pH, UA 08/25/2021 6 5     Leukocytes, UA 08/25/2021 Negative     Nitrite, UA 08/25/2021 Negative     Protein, UA 08/25/2021 Negative     Glucose, UA 08/25/2021 Negative     Ketones, UA 08/25/2021 Negative     Urobilinogen, UA 08/25/2021 0 2     Bilirubin, UA 08/25/2021 Negative     Blood, UA 08/25/2021 Negative     WBC 08/26/2021 10 21*    RBC 08/26/2021 4 61     Hemoglobin 08/26/2021 10 6*    Hematocrit 08/26/2021 36 1*    MCV 08/26/2021 78*    MCH 08/26/2021 23 0*    MCHC 08/26/2021 29 4*    RDW 08/26/2021 16 2*    MPV 08/26/2021 12 0     Platelets 73/62/4126 241     nRBC 08/26/2021 0     Neutrophils Relative 08/26/2021 56     Immat GRANS % 08/26/2021 1     Lymphocytes Relative 08/26/2021 18     Monocytes Relative 08/26/2021 22*    Eosinophils Relative 08/26/2021 2     Basophils Relative 08/26/2021 1     Neutrophils Absolute 08/26/2021 5 69     Immature Grans Absolute 08/26/2021 0 09     Lymphocytes Absolute 08/26/2021 1 85     Monocytes Absolute 08/26/2021 2 27*    Eosinophils Absolute 08/26/2021 0 25     Basophils Absolute 08/26/2021 0 06     Sodium 08/26/2021 138     Potassium 08/26/2021 3 7     Chloride 08/26/2021 108     CO2 08/26/2021 26     ANION GAP 08/26/2021 4     BUN 08/26/2021 8     Creatinine 08/26/2021 0 77     Glucose 08/26/2021 95     Calcium 08/26/2021 9 0     eGFR 08/26/2021 118     Fecal Occult Blood Diagn* 08/26/2021 Positive*    Fecal Occult Blood Diagn* 08/26/2021 Test not performed     Fecal Occult Blood Diagn* 08/26/2021 Test not performed     Sodium 08/27/2021 142     Potassium 08/27/2021 4 0     Chloride 08/27/2021 111*    CO2 08/27/2021 27     ANION GAP 08/27/2021 4     BUN 08/27/2021 9     Creatinine 08/27/2021 0 74     Glucose 08/27/2021 103     Calcium 08/27/2021 8 8     eGFR 08/27/2021 120     WBC 08/27/2021 10 33*    RBC 08/27/2021 4 72     Hemoglobin 08/27/2021 10 9*    Hematocrit 08/27/2021 37 6     MCV 08/27/2021 80*    MCH 08/27/2021 23 1*    MCHC 08/27/2021 29 0*    RDW 08/27/2021 16 2*    Platelets 78/63/2503 254     MPV 08/27/2021 11 7        Recent Results (from the past 24 hour(s))   Occult blood 1-3, stool    Collection Time: 08/26/21  2:11 PM   Result Value Ref Range    Fecal Occult Blood Diagnostic Positive (A) Negative    Fecal Occult Blood Diagnostic 2 Test not performed Negative    Fecal Occult Blood Diagnostic 3 Test not performed Negative   Basic metabolic panel    Collection Time: 08/27/21  4:41 AM   Result Value Ref Range    Sodium 142 136 - 145 mmol/L    Potassium 4 0 3 5 - 5 3 mmol/L    Chloride 111 (H) 100 - 108 mmol/L    CO2 27 21 - 32 mmol/L    ANION GAP 4 4 - 13 mmol/L    BUN 9 5 - 25 mg/dL    Creatinine 0 74 0 60 - 1 30 mg/dL    Glucose 103 65 - 140 mg/dL    Calcium 8 8 8 3 - 10 1 mg/dL    eGFR 120 ml/min/1 73sq m   CBC and Platelet    Collection Time: 08/27/21 10:45 AM   Result Value Ref Range WBC 10 33 (H) 4 31 - 10 16 Thousand/uL    RBC 4 72 3 88 - 5 62 Million/uL    Hemoglobin 10 9 (L) 12 0 - 17 0 g/dL    Hematocrit 37 6 36 5 - 49 3 %    MCV 80 (L) 82 - 98 fL    MCH 23 1 (L) 26 8 - 34 3 pg    MCHC 29 0 (L) 31 4 - 37 4 g/dL    RDW 16 2 (H) 11 6 - 15 1 %    Platelets 047 574 - 734 Thousands/uL    MPV 11 7 8 9 - 12 7 fL     Blood Culture: No results found for: BLOODCX,   Urinalysis:   Lab Results   Component Value Date    COLORU Yellow 08/25/2021    CLARITYU Clear 08/25/2021    SPECGRAV 1 022 08/25/2021    PHUR 6 5 08/25/2021    PHUR 6 0 08/29/2017    LEUKOCYTESUR Negative 08/25/2021    NITRITE Negative 08/25/2021    GLUCOSEU Negative 08/25/2021    KETONESU Negative 08/25/2021    BILIRUBINUR Negative 08/25/2021    BLOODU Negative 08/25/2021   ,     Imaging:  XR chest 2 views    Result Date: 8/25/2021  Poor inspiration with prominent markings No acute cardiopulmonary disease  Workstation performed: [id number]     CT abdomen pelvis with contrast    Result Date: 8/25/2021  1  Diffuse mild-moderate colonic wall thickening with scattered air-fluid levels throughout suggesting colitis  2   Several mildly prominent nonspecific mesenteric lymph nodes, particularly in the right lower quadrant which are presumably reactive, possibly related to colitis  3   Additional findings as noted  The study was marked in [facility] for immediate notification   Workstation performed: [id number]        Current Facility-Administered Medications   Medication Dose Route Frequency    acetaminophen (TYLENOL) tablet 325 mg  325 mg Oral Q4H PRN    amLODIPine (NORVASC) tablet 5 mg  5 mg Oral Daily    bismuth subsalicylate (PEPTO BISMOL) oral suspension 262 mg  262 mg Oral Q6H PRN    ceftriaxone (ROCEPHIN) 1 g/50 mL in dextrose IVPB  1,000 mg Intravenous Q24H    dextromethorphan-guaiFENesin (ROBITUSSIN DM) oral syrup 5 mL  5 mL Oral TID PRN    divalproex sodium (DEPAKOTE ER) 24 hr tablet 500 mg  500 mg Oral Daily    docusate sodium (COLACE) capsule 100 mg  100 mg Oral BID    fluticasone (FLONASE) 50 mcg/act nasal spray 1 spray  1 spray Nasal Daily    gabapentin (NEURONTIN) capsule 800 mg  800 mg Oral TID    ibuprofen (MOTRIN) tablet 600 mg  600 mg Oral Q6H PRN    lithium carbonate (LITHOBID) CR tablet 900 mg  900 mg Oral Daily    loratadine (CLARITIN) tablet 5 mg  5 mg Oral Daily    LORazepam (ATIVAN) tablet 1 mg  1 mg Oral BID    metroNIDAZOLE (FLAGYL) IVPB (premix) 500 mg 100 mL  500 mg Intravenous Q8H    OLANZapine (ZyPREXA) tablet 5 mg  5 mg Oral HS    pantoprazole (PROTONIX) EC tablet 40 mg  40 mg Oral Early Morning    phenol (CHLORASEPTIC) 1 4 % mucosal liquid 1 spray  1 spray Mouth/Throat Q2H PRN    sodium chloride 0 9 % infusion  75 mL/hr Intravenous Continuous             Allison Wakefield DO PGY-1  Family Medicine

## 2021-08-27 NOTE — UTILIZATION REVIEW
Initial Clinical Review    Admission: Date/Time/Statement:  Observation 8/25 @ 1628 and changed to Inpatient on 8/27 @ 0910  Pt requiring continued stay for treatment of Acute colitis/ + Heme occult and Iv antibiotics  Ordered        08/27/21 0910  Inpatient Admission  Once         08/25/21 1628  Place in Observation  Once                   Orders Placed This Encounter   Procedures    Inpatient Admission     Standing Status:   Standing     Number of Occurrences:   1     Order Specific Question:   Level of Care     Answer:   Med Surg [16]     Order Specific Question:   Estimated length of stay     Answer:   More than 2 Midnights     Order Specific Question:   Certification     Answer:   I certify that inpatient services are medically necessary for this patient for a duration of greater than two midnights  See H&P and MD Progress Notes for additional information about the patient's course of treatment  ED Arrival Information     Expected Arrival Acuity    - 8/25/2021 11:41 Urgent         Means of arrival Escorted by Service Admission type    Walk-In Self General Medicine Urgent         Arrival complaint    Diarrhea/Weakness        Chief Complaint   Patient presents with    Abdominal Pain     Pt c/o epigastric pain since Sunday, diarrhea last night  Initial Presentation:   29y Male to ED presents with abdominal pain, weakness, decreased appetite and nausea  Per caregiver, pt has been "off" since Saturday and large volume episode of diarrhea, non bloody  PMH for GERD, HTN , Anxiety, Constipation and Bipolar 1 disorder  Admit Observation level of care for Acute Colitis  Given Iv antibiotics in ED and continue  Resume diet as tolerated  Continue home meds  8/25 CT AP: Diffuse mild-moderate colonic wall thickening with scattered air-fluid levels throughout suggesting colitis   Several mildly prominent nonspecific mesenteric lymph nodes, particularly in the right lower quadrant which are presumably reactive, possibly related to colitis    8/26 Progress notes; Continue Iv antibiotics  Failed to show stool to nurse before flushing  Per caregiver, pt went to bathroom 11 times, unsure if he had stools each time but caregiver saw bld on toilet seat x1     8/27 Progress notes; Pt with 2 episodes of liquid bld per rectum  Heme occult positive  Continue Iv antibiotics  C diff, stool culture and fecal leukocytes pending  GI consult  Pt c/o lower middle abdominal pain  Date: 8/28   Day 2:    ED Triage Vitals   08/25/21 1213 08/25/21 1213 08/25/21 1213 08/25/21 1213 08/25/21 1213   98 3 °F (36 8 °C) 105 18 130/86 98 %      Oral Monitor         Pain Score       4          08/25/21 102 kg (225 lb 5 oz)     Additional Vital Signs:   08/27/21 07:51:05  98 4 °F (36 9 °C)  --  --  120/76  91  --  --     Date/Time  Temp  Pulse  Resp  BP  MAP   SpO2  O2 Device   08/26/21 13:13:51  98 9 °F (37 2°C)  83  16  127/89  102  99 %  None (Room air)   08/26/21 1313  --  --  --  --  --  97 %  None (Room air)   08/26/21 0800  --  84  18  142/112  Abnormal   124  99 %  --   08/26/21 0225  --  94  18  129/81  --  97 %  None (Room air)   08/25/21 2015  --  102  18  140/78  --  97 %  None (Room air)   08/25/21 1556  --  102  18  124/89  --  99 %  None (Room air)       Pertinent Labs/Diagnostic Test Results:     Collection Time Result Time Vent R Atrial R NV Int  QRSD Int  QT Int  QTC Int  P Axis QRS Axis T Wave Ax    08/25/21 12:51:22 08/25/21 13:33:58 101 101 166 142 374 484 26 95 5                     Sinus tachycardia   Right bundle branch block   Abnormal ECG   When compared with ECG of 11-MAY-2016 14:20,   Right bundle branch block is now Present                 CT abdomen pelvis with contrast   Final  (08/25 1530)         1  Diffuse mild-moderate colonic wall thickening with scattered air-fluid levels throughout suggesting colitis     2   Several mildly prominent nonspecific mesenteric lymph nodes, particularly in the right lower quadrant which are presumably reactive, possibly related to colitis  3   Additional findings as noted  XR chest 2 views   Final  (08/25 1548)   Poor inspiration with prominent markings   No acute cardiopulmonary disease          Results from last 7 days   Lab Units 08/25/21  1256   SARS-COV-2  Negative     Results from last 7 days   Lab Units 08/27/21  1045 08/26/21  0736 08/25/21  1256   WBC Thousand/uL 10 33* 10 21* 12 16*   HEMOGLOBIN g/dL 10 9* 10 6* 12 0   HEMATOCRIT % 37 6 36 1* 41 2   PLATELETS Thousands/uL 254 241 251   NEUTROS ABS Thousands/µL  --  5 69 7 50         Results from last 7 days   Lab Units 08/27/21  0441 08/26/21  1308 08/25/21  1256   SODIUM mmol/L 142 138 139   POTASSIUM mmol/L 4 0 3 7 3 9   CHLORIDE mmol/L 111* 108 108   CO2 mmol/L 27 26 25   ANION GAP mmol/L 4 4 6   BUN mg/dL 9 8 13   CREATININE mg/dL 0 74 0 77 0 83   EGFR ml/min/1 73sq m 120 118 114   CALCIUM mg/dL 8 8 9 0 8 9     Results from last 7 days   Lab Units 08/25/21  1256   AST U/L 35   ALT U/L 58   ALK PHOS U/L 56   TOTAL PROTEIN g/dL 8 0   ALBUMIN g/dL 2 9*   TOTAL BILIRUBIN mg/dL 0 35         Results from last 7 days   Lab Units 08/27/21  0441 08/26/21  1308 08/25/21  1256   GLUCOSE RANDOM mg/dL 103 95 90         Results from last 7 days   Lab Units 08/25/21  1421   TROPONIN I ng/mL <0 02         Results from last 7 days   Lab Units 08/25/21  1256   LIPASE u/L 66*         Results from last 7 days   Lab Units 08/25/21  1439   CLARITY UA  Clear   COLOR UA  Yellow   SPEC GRAV UA  1 022   PH UA  6 5   GLUCOSE UA mg/dl Negative   KETONES UA mg/dl Negative   BLOOD UA  Negative   PROTEIN UA mg/dl Negative   NITRITE UA  Negative   BILIRUBIN UA  Negative   UROBILINOGEN UA E U /dl 0 2   LEUKOCYTES UA  Negative       ED Treatment:   Medication Administration from 08/25/2021 1141 to 08/26/2021 1101       Date/Time Order Dose Route Action     08/25/2021 1551 sodium chloride 0 9 % bolus 1,000 mL 1,000 mL Intravenous New Bag 08/25/2021 1551 ceFAZolin (ANCEF) IVPB (premix in dextrose) 2,000 mg 50 mL 2,000 mg Intravenous New Bag     08/26/2021 0828 gabapentin (NEURONTIN) capsule 800 mg 800 mg Oral Given     08/26/2021 4841 LORazepam (ATIVAN) tablet 1 mg 1 mg Oral Given     08/25/2021 1955 LORazepam (ATIVAN) tablet 1 mg 1 mg Oral Given     08/25/2021 2214 OLANZapine (ZyPREXA) tablet 5 mg 5 mg Oral Given     08/26/2021 0828 amLODIPine (NORVASC) tablet 5 mg 5 mg Oral Given     08/25/2021 1959 amLODIPine (NORVASC) tablet 5 mg 5 mg Oral Given     08/26/2021 0523 pantoprazole (PROTONIX) EC tablet 40 mg 40 mg Oral Given     08/25/2021 1955 docusate sodium (COLACE) capsule 100 mg 100 mg Oral Given     08/26/2021 0833 metroNIDAZOLE (FLAGYL) IVPB (premix) 500 mg 100 mL 500 mg Intravenous New Bag     08/25/2021 1955 metroNIDAZOLE (FLAGYL) IVPB (premix) 500 mg 100 mL 500 mg Intravenous New Bag     08/26/2021 0523 ceftriaxone (ROCEPHIN) 1 g/50 mL in dextrose IVPB 1,000 mg Intravenous New Bag        Past Medical History:   Diagnosis Date    ADHD (attention deficit hyperactivity disorder)     Atypical pervasive developmental disorder     Autism     [facility]ipolar disorder [facility]     Constipation     Depression     Head-banging     Last Assessed:  9/1/17    Hydronephrosis 9/19/2019    Hyperlipidemia     Hypertension     Intermittent explosive disorder     Oppositional defiant disorder     Personality disorder ([facility]     Schizophrenia ([facility]     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[facility]     Seizures ([facility]     Sleep disorder     Vitamin D insufficiency     Last Assessed:  6/22/17     Present on Admission:   Bipolar 1 disorder ([facility]   Constipation   Benign essential hypertension   GERD (gastroesophageal reflux disease)   Anxiety      Admitting Diagnosis:     Colitis [K52 9]  Epigastric pain [R10 13]  Weakness [R53 1]    Age/Sex: 29 y o  male    Scheduled Medications:    amLODIPine, 5 mg, Oral, Daily  cefTRIAXone, 1,000 mg, Intravenous, Q24H  divalproex sodium, 500 mg, Oral, Daily  docusate sodium, 100 mg, Oral, BID  fluticasone, 1 spray, Nasal, Daily  gabapentin, 800 mg, Oral, TID  lithium carbonate, 900 mg, Oral, Daily  loratadine, 5 mg, Oral, Daily  LORazepam, 1 mg, Oral, BID  metroNIDAZOLE, 500 mg, Intravenous, Q8H  OLANZapine, 5 mg, Oral, HS  pantoprazole, 40 mg, Oral, Early Morning      Continuous IV Infusions:  sodium chloride, 75 mL/hr, Intravenous, Continuous      PRN Meds:  acetaminophen, 325 mg, Oral, Q4H PRN  bismuth subsalicylate, 216 mg, Oral, Q6H PRN  dextromethorphan-guaiFENesin, 5 mL, Oral, TID PRN  ibuprofen, 600 mg, Oral, Q6H PRN  phenol, 1 spray, Mouth/Throat, Q2H PRN        IP CONSULT TO GASTROENTEROLOGY    Network Utilization Review Department  ATTENTION: Please call with any questions or concerns to [phone] and carefully listen to the prompts so that you are directed to the right person  All voicemails are confidential   Avelina Cowden all requests for admission clinical reviews, approved or denied determinations and any other requests to dedicated fax number below belonging to the campus where the patient is receiving treatment   List of dedicated fax numbers for the Facilities:  [address] DENIALS (Administrative/Medical Necessity) [phone]   [address] (Maternity/NICU/Pediatrics) [phone]   [address] [place] [address] [address] [facility] [facility] [phone] [place] [address] [address] [facility] [phone]

## 2021-08-27 NOTE — PLAN OF CARE
Problem: MOBILITY - ADULT  Goal: Maintain or return to baseline ADL function  Description: INTERVENTIONS:  -  Assess patient's ability to carry out ADLs; assess patient's baseline for ADL function and identify physical deficits which impact ability to perform ADLs (bathing, care of mouth/teeth, toileting, grooming, dressing, etc )  - Assess/evaluate cause of self-care deficits   - Assess range of motion  - Assess patient's mobility; develop plan if impaired  - Assess patient's need for assistive devices and provide as appropriate  - Encourage maximum independence but intervene and supervise when necessary  - Involve family in performance of ADLs  - Assess for home care needs following discharge   - Consider OT consult to assist with ADL evaluation and planning for discharge  - Provide patient education as appropriate  Outcome: Progressing  Goal: Maintains/Returns to pre admission functional level  Description: INTERVENTIONS:  - Perform BMAT or MOVE assessment daily    - Set and communicate daily mobility goal to care team and patient/family/caregiver  - Collaborate with rehabilitation services on mobility goals if consulted  - Perform Range of Motion 2 times a day  - Reposition patient every 2 hours    - Dangle patient 2 times a day  - Stand patient 2 times a day  - Ambulate patient 2 times a day  - Out of bed to chair 2 times a day   - Out of bed for meals 2 times a day  - Out of bed for toileting  - Record patient progress and toleration of activity level   Outcome: Progressing     Problem: Potential for Falls  Goal: Patient will remain free of falls  Description: INTERVENTIONS:  - Educate patient/family on patient safety including physical limitations  - Instruct patient to call for assistance with activity   - Consult OT/PT to assist with strengthening/mobility   - Keep Call bell within reach  - Keep bed low and locked with side rails adjusted as appropriate  - Keep care items and personal belongings within reach  - Initiate and maintain comfort rounds  - Make Fall Risk Sign visible to staff  - Offer Toileting every 2 Hours, in advance of need  - Initiate/Maintain bed alarm  - Apply yellow socks and bracelet for high fall risk patients  - Consider moving patient to room near nurses station  Outcome: Progressing     Problem: PAIN - ADULT  Goal: Verbalizes/displays adequate comfort level or baseline comfort level  Description: Interventions:  - Encourage patient to monitor pain and request assistance  - Assess pain using appropriate pain scale  - Administer analgesics based on type and severity of pain and evaluate response  - Implement non-pharmacological measures as appropriate and evaluate response  - Consider cultural and social influences on pain and pain management  - Notify physician/advanced practitioner if interventions unsuccessful or patient reports new pain  Outcome: Progressing     Problem: SAFETY ADULT  Goal: Maintain or return to baseline ADL function  Description: INTERVENTIONS:  -  Assess patient's ability to carry out ADLs; assess patient's baseline for ADL function and identify physical deficits which impact ability to perform ADLs (bathing, care of mouth/teeth, toileting, grooming, dressing, etc )  - Assess/evaluate cause of self-care deficits   - Assess range of motion  - Assess patient's mobility; develop plan if impaired  - Assess patient's need for assistive devices and provide as appropriate  - Encourage maximum independence but intervene and supervise when necessary  - Involve family in performance of ADLs  - Assess for home care needs following discharge   - Consider OT consult to assist with ADL evaluation and planning for discharge  - Provide patient education as appropriate  Outcome: Progressing  Goal: Maintains/Returns to pre admission functional level  Description: INTERVENTIONS:  - Perform BMAT or MOVE assessment daily    - Set and communicate daily mobility goal to care team and patient/family/caregiver  - Collaborate with rehabilitation services on mobility goals if consulted  - Perform Range of Motion 2 times a day  - Reposition patient every 2 hours    - Dangle patient 2 times a day  - Stand patient 2 times a day  - Ambulate patient 2 times a day  - Out of bed to chair 2 times a day   - Out of bed for meals 2 times a day  - Out of bed for toileting  - Record patient progress and toleration of activity level   Outcome: Progressing  Goal: Patient will remain free of falls  Description: INTERVENTIONS:  - Educate patient/family on patient safety including physical limitations  - Instruct patient to call for assistance with activity   - Consult OT/PT to assist with strengthening/mobility   - Keep Call bell within reach  - Keep bed low and locked with side rails adjusted as appropriate  - Keep care items and personal belongings within reach  - Initiate and maintain comfort rounds  - Make Fall Risk Sign visible to staff  - Offer Toileting every 2 Hours, in advance of need  - Initiate/Maintain bed alarm  - Apply yellow socks and bracelet for high fall risk patients  - Consider moving patient to room near nurses station  Outcome: Progressing     Problem: DISCHARGE PLANNING  Goal: Discharge to home or other facility with appropriate resources  Description: INTERVENTIONS:  - Identify barriers to discharge w/patient and caregiver  - Arrange for needed discharge resources and transportation as appropriate  - Identify discharge learning needs (meds, wound care, etc )  - Arrange for interpretive services to assist at discharge as needed  - Refer to Case Management Department for coordinating discharge planning if the patient needs post-hospital services based on physician/advanced practitioner order or complex needs related to functional status, cognitive ability, or social support system  Outcome: Progressing     Problem: Knowledge Deficit  Goal: Patient/family/caregiver demonstrates understanding of disease process, treatment plan, medications, and discharge instructions  Description: Complete learning assessment and assess knowledge base    Interventions:  - Provide teaching at level of understanding  - Provide teaching via preferred learning methods  Outcome: Progressing

## 2021-08-28 LAB
ANION GAP SERPL CALCULATED.3IONS-SCNC: 0 MMOL/L (ref 4–13)
BUN SERPL-MCNC: 8 MG/DL (ref 5–25)
CALCIUM SERPL-MCNC: 9.1 MG/DL (ref 8.3–10.1)
CHLORIDE SERPL-SCNC: 113 MMOL/L (ref 100–108)
CO2 SERPL-SCNC: 28 MMOL/L (ref 21–32)
CREAT SERPL-MCNC: 0.85 MG/DL (ref 0.6–1.3)
ERYTHROCYTE [DISTWIDTH] IN BLOOD BY AUTOMATED COUNT: 16.8 % (ref 11.6–15.1)
GFR SERPL CREATININE-BSD FRML MDRD: 113 ML/MIN/1.73SQ M
GLUCOSE SERPL-MCNC: 92 MG/DL (ref 65–140)
HCT VFR BLD AUTO: 36.7 % (ref 36.5–49.3)
HGB BLD-MCNC: 10.4 G/DL (ref 12–17)
MCH RBC QN AUTO: 22.9 PG (ref 26.8–34.3)
MCHC RBC AUTO-ENTMCNC: 28.3 G/DL (ref 31.4–37.4)
MCV RBC AUTO: 81 FL (ref 82–98)
PLATELET # BLD AUTO: 273 THOUSANDS/UL (ref 149–390)
PMV BLD AUTO: 13.3 FL (ref 8.9–12.7)
POTASSIUM SERPL-SCNC: 4.1 MMOL/L (ref 3.5–5.3)
RBC # BLD AUTO: 4.55 MILLION/UL (ref 3.88–5.62)
SODIUM SERPL-SCNC: 141 MMOL/L (ref 136–145)
WBC # BLD AUTO: 10.08 THOUSAND/UL (ref 4.31–10.16)

## 2021-08-28 PROCEDURE — 87505 NFCT AGENT DETECTION GI: CPT

## 2021-08-28 PROCEDURE — 85027 COMPLETE CBC AUTOMATED: CPT

## 2021-08-28 PROCEDURE — 80048 BASIC METABOLIC PNL TOTAL CA: CPT

## 2021-08-28 PROCEDURE — 87493 C DIFF AMPLIFIED PROBE: CPT

## 2021-08-28 PROCEDURE — 89055 LEUKOCYTE ASSESSMENT FECAL: CPT

## 2021-08-28 RX ADMIN — LORATADINE 5 MG: 10 TABLET ORAL at 08:32

## 2021-08-28 RX ADMIN — DIVALPROEX SODIUM 500 MG: 500 TABLET, EXTENDED RELEASE ORAL at 16:48

## 2021-08-28 RX ADMIN — PANTOPRAZOLE SODIUM 40 MG: 40 TABLET, DELAYED RELEASE ORAL at 05:28

## 2021-08-28 RX ADMIN — GABAPENTIN 800 MG: 400 CAPSULE ORAL at 08:32

## 2021-08-28 RX ADMIN — METRONIDAZOLE 500 MG: 500 INJECTION, SOLUTION INTRAVENOUS at 02:35

## 2021-08-28 RX ADMIN — OLANZAPINE 5 MG: 5 TABLET, FILM COATED ORAL at 20:25

## 2021-08-28 RX ADMIN — GABAPENTIN 800 MG: 400 CAPSULE ORAL at 16:47

## 2021-08-28 RX ADMIN — SODIUM CHLORIDE 75 ML/HR: 0.9 INJECTION, SOLUTION INTRAVENOUS at 02:36

## 2021-08-28 RX ADMIN — LITHIUM CARBONATE 900 MG: 450 TABLET ORAL at 16:47

## 2021-08-28 RX ADMIN — METRONIDAZOLE 500 MG: 500 INJECTION, SOLUTION INTRAVENOUS at 16:45

## 2021-08-28 RX ADMIN — CEFTRIAXONE 1000 MG: 1 INJECTION, POWDER, FOR SOLUTION INTRAMUSCULAR; INTRAVENOUS at 05:28

## 2021-08-28 RX ADMIN — DOCUSATE SODIUM 100 MG: 100 CAPSULE ORAL at 08:33

## 2021-08-28 RX ADMIN — METRONIDAZOLE 500 MG: 500 INJECTION, SOLUTION INTRAVENOUS at 08:33

## 2021-08-28 RX ADMIN — AMLODIPINE BESYLATE 5 MG: 5 TABLET ORAL at 08:32

## 2021-08-28 RX ADMIN — GABAPENTIN 800 MG: 400 CAPSULE ORAL at 20:25

## 2021-08-28 RX ADMIN — SODIUM CHLORIDE 75 ML/HR: 0.9 INJECTION, SOLUTION INTRAVENOUS at 16:45

## 2021-08-28 RX ADMIN — LORAZEPAM 1 MG: 0.5 TABLET ORAL at 11:56

## 2021-08-28 RX ADMIN — LORAZEPAM 1 MG: 0.5 TABLET ORAL at 16:48

## 2021-08-28 NOTE — UTILIZATION REVIEW
Initial Clinical Review    Admission: Date/Time/Statement:  Observation 8/25 @ 1628 and changed to Inpatient on 8/27 @ 0910  Pt requiring continued stay for treatment of Acute colitis/ + Heme occult and Iv antibiotics  Ordered        08/27/21 0910  Inpatient Admission  Once         08/25/21 1628  Place in Observation  Once                   Orders Placed This Encounter   Procedures    Inpatient Admission     Standing Status:   Standing     Number of Occurrences:   1     Order Specific Question:   Level of Care     Answer:   Med Surg [16]     Order Specific Question:   Estimated length of stay     Answer:   More than 2 Midnights     Order Specific Question:   Certification     Answer:   I certify that inpatient services are medically necessary for this patient for a duration of greater than two midnights  See H&P and MD Progress Notes for additional information about the patient's course of treatment  ED Arrival Information     Expected Arrival Acuity    - 8/25/2021 11:41 Urgent         Means of arrival Escorted by Service Admission type    Walk-In Self General Medicine Urgent         Arrival complaint    Diarrhea/Weakness        Chief Complaint   Patient presents with    Abdominal Pain     Pt c/o epigastric pain since Sunday, diarrhea last night  Initial Presentation:   29y Male to ED presents with abdominal pain, weakness, decreased appetite and nausea  Per caregiver, pt has been "off" since Saturday and large volume episode of diarrhea, non bloody  PMH for GERD, HTN , Anxiety, Constipation and Bipolar 1 disorder  Admit Observation level of care for Acute Colitis  Given Iv antibiotics in ED and continue  Resume diet as tolerated  Continue home meds  8/25 CT AP: Diffuse mild-moderate colonic wall thickening with scattered air-fluid levels throughout suggesting colitis   Several mildly prominent nonspecific mesenteric lymph nodes, particularly in the right lower quadrant which are presumably reactive, possibly related to colitis    8/26 Progress notes; Continue Iv antibiotics  Failed to show stool to nurse before flushing  Per caregiver, pt went to bathroom 11 times, unsure if he had stools each time but caregiver saw bld on toilet seat x1     8/27 Progress notes; Pt with 2 episodes of liquid bld per rectum  Heme occult positive  Continue Iv antibiotics  C diff, stool culture and fecal leukocytes pending  GI consult  Pt c/o lower middle abdominal pain  8/27 GI cons; Diarrhea, Rectal bleeding  likely acute infectious enterocolitis  Lower suspicion for bacterial etiology  Possible component of IBS as well although acuity not consistent with this  Low suspicion for IBD  F/u stool studies  No plan for endoscopic evaluation at this time  Hold off on antidiarrheals for now  Can use Banatrol or Metamucil for stool-bulking if needed  IVFs  Continue PPI daily  Date: 8/28   Day 2:  Progress notes; No BM since 8/27 am  Continue Iv antibiotics  C diff  stool culture & fecal leukocytes pending BM  Heme occult positive, Hgb of 10 4 today 08/28       ED Triage Vitals   08/25/21 1213 08/25/21 1213 08/25/21 1213 08/25/21 1213 08/25/21 1213   98 3 °F (36 8 °C) 105 18 130/86 98 %      Oral Monitor         Pain Score       4          08/25/21 102 kg (225 lb 5 oz)     Additional Vital Signs:   08/28/21 08:23:49  97 8 °F (36 6 °C)  --  --  118/81  93  --  --  --   08/27/21 22:19:36  97 6 °F (36 4 °C)  --  --  119/80  93  --  --  --   08/27/21 2000  --  --  --  --  --  --  None (Room air)       08/27/21 07:51:05  98 4 °F (36 9 °C)  --  --  120/76  91  --  --     Date/Time  Temp  Pulse  Resp  BP  MAP   SpO2  O2 Device   08/26/21 13:13:51  98 9 °F (37 2°C)  83  16  127/89  102  99 %  None (Room air)   08/26/21 1313  --  --  --  --  --  97 %  None (Room air)   08/26/21 0800  --  84  18  142/112  Abnormal   124  99 %  --   08/26/21 0225  --  94  18  129/81  --  97 %  None (Room air)   08/25/21 2015  --  102  18 140/78  --  97 %  None (Room air)   08/25/21 1556  --  102  18  124/89  --  99 %  None (Room air)     Pertinent Labs/Diagnostic Test Results:     Collection Time Result Time Vent R Atrial R OH Int  QRSD Int  QT Int  QTC Int  P Axis QRS Axis T Wave Ax    08/25/21 12:51:22 08/25/21 13:33:58 101 101 166 142 374 484 26 95 5                     Sinus tachycardia   Right bundle branch block   Abnormal ECG   When compared with ECG of 11-MAY-2016 14:20,   Right bundle branch block is now Present                 CT abdomen pelvis with contrast   Final  (08/25 1530)         1  Diffuse mild-moderate colonic wall thickening with scattered air-fluid levels throughout suggesting colitis  2   Several mildly prominent nonspecific mesenteric lymph nodes, particularly in the right lower quadrant which are presumably reactive, possibly related to colitis  3   Additional findings as noted  XR chest 2 views   Final  (08/25 1548)   Poor inspiration with prominent markings   No acute cardiopulmonary disease          Results from last 7 days   Lab Units 08/25/21  1256   SARS-COV-2  Negative     Results from last 7 days   Lab Units 08/28/21  0451 08/27/21  1045 08/26/21  0736 08/25/21  1256   WBC Thousand/uL 10 08 10 33* 10 21* 12 16*   HEMOGLOBIN g/dL 10 4* 10 9* 10 6* 12 0   HEMATOCRIT % 36 7 37 6 36 1* 41 2   PLATELETS Thousands/uL 273 254 241 251   NEUTROS ABS Thousands/µL  --   --  5 69 7 50         Results from last 7 days   Lab Units 08/28/21  0451 08/27/21  0441 08/26/21  1308 08/25/21  1256   SODIUM mmol/L 141 142 138 139   POTASSIUM mmol/L 4 1 4 0 3 7 3 9   CHLORIDE mmol/L 113* 111* 108 108   CO2 mmol/L 28 27 26 25   ANION GAP mmol/L 0* 4 4 6   BUN mg/dL 8 9 8 13   CREATININE mg/dL 0 85 0 74 0 77 0 83   EGFR ml/min/1 73sq m 113 120 118 114   CALCIUM mg/dL 9 1 8 8 9 0 8 9     Results from last 7 days   Lab Units 08/25/21  1256   AST U/L 35   ALT U/L 58   ALK PHOS U/L 56   TOTAL PROTEIN g/dL 8 0   ALBUMIN g/dL 2 9* TOTAL BILIRUBIN mg/dL 0 35         Results from last 7 days   Lab Units 08/28/21  0451 08/27/21  0441 08/26/21  1308 08/25/21  1256   GLUCOSE RANDOM mg/dL 92 103 95 90         Results from last 7 days   Lab Units 08/25/21  1421   TROPONIN I ng/mL <0 02       Results from last 7 days   Lab Units 08/25/21  1256   LIPASE u/L 66*       Results from last 7 days   Lab Units 08/25/21  1439   CLARITY UA  Clear   COLOR UA  Yellow   SPEC GRAV UA  1 022   PH UA  6 5   GLUCOSE UA mg/dl Negative   KETONES UA mg/dl Negative   BLOOD UA  Negative   PROTEIN UA mg/dl Negative   NITRITE UA  Negative   BILIRUBIN UA  Negative   UROBILINOGEN UA E U /dl 0 2   LEUKOCYTES UA  Negative       ED Treatment:   Medication Administration from 08/25/2021 1141 to 08/26/2021 1101       Date/Time Order Dose Route Action     08/25/2021 1551 sodium chloride 0 9 % bolus 1,000 mL 1,000 mL Intravenous New Bag     08/25/2021 1551 ceFAZolin (ANCEF) IVPB (premix in dextrose) 2,000 mg 50 mL 2,000 mg Intravenous New Bag     08/26/2021 0828 gabapentin (NEURONTIN) capsule 800 mg 800 mg Oral Given     08/26/2021 0833 LORazepam (ATIVAN) tablet 1 mg 1 mg Oral Given     08/25/2021 1955 LORazepam (ATIVAN) tablet 1 mg 1 mg Oral Given     08/25/2021 2214 OLANZapine (ZyPREXA) tablet 5 mg 5 mg Oral Given     08/26/2021 0828 amLODIPine (NORVASC) tablet 5 mg 5 mg Oral Given     08/25/2021 1959 amLODIPine (NORVASC) tablet 5 mg 5 mg Oral Given     08/26/2021 0523 pantoprazole (PROTONIX) EC tablet 40 mg 40 mg Oral Given     08/25/2021 1955 docusate sodium (COLACE) capsule 100 mg 100 mg Oral Given     08/26/2021 0833 metroNIDAZOLE (FLAGYL) IVPB (premix) 500 mg 100 mL 500 mg Intravenous New Bag     08/25/2021 1955 metroNIDAZOLE (FLAGYL) IVPB (premix) 500 mg 100 mL 500 mg Intravenous New Bag     08/26/2021 0523 ceftriaxone (ROCEPHIN) 1 g/50 mL in dextrose IVPB 1,000 mg Intravenous New Bag        Past Medical History:   Diagnosis Date    ADHD (attention deficit hyperactivity disorder)     Atypical pervasive developmental disorder     Autism     Bipolar disorder ([place] [address] )     Constipation     Depression     Head-banging     Last Assessed:  9/1/17    Hydronephrosis 9/19/2019    Hyperlipidemia     Hypertension     Intermittent explosive disorder     Oppositional defiant disorder     Personality disorder ([place] [address] )     Schizophrenia ([place] [address] )     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[place] [address] )     Seizures ([place] [address] )     Sleep disorder     Vitamin D insufficiency     Last Assessed:  6/22/17     Present on Admission:   Bipolar 1 disorder ([place] [address] )   Constipation   Benign essential hypertension   GERD (gastroesophageal reflux disease)   Anxiety      Admitting Diagnosis:     Colitis [K52 9]  Epigastric pain [R10 13]  Weakness [R53 1]    Age/Sex: 29 y o  male    Scheduled Medications:  amLODIPine, 5 mg, Oral, Daily  cefTRIAXone, 1,000 mg, Intravenous, Q24H  divalproex sodium, 500 mg, Oral, Daily  fluticasone, 1 spray, Nasal, Daily  gabapentin, 800 mg, Oral, TID  lithium carbonate, 900 mg, Oral, Daily  loratadine, 5 mg, Oral, Daily  LORazepam, 1 mg, Oral, BID  metroNIDAZOLE, 500 mg, Intravenous, Q8H  OLANZapine, 5 mg, Oral, HS  pantoprazole, 40 mg, Oral, Early Morning      Continuous IV Infusions:  sodium chloride, 75 mL/hr, Intravenous, Continuous      PRN Meds:  acetaminophen, 325 mg, Oral, Q4H PRN  bismuth subsalicylate, 934 mg, Oral, Q6H PRN  dextromethorphan-guaiFENesin, 5 mL, Oral, TID PRN  ibuprofen, 600 mg, Oral, Q6H PRN  phenol, 1 spray, Mouth/Throat, Q2H PRN        IP CONSULT TO GASTROENTEROLOGY    Network Utilization Review Department  ATTENTION: Please call with any questions or concerns to [phone] and carefully listen to the prompts so that you are directed to the right person   All voicemails are confidential   [place] Drop all requests for admission clinical reviews, approved or denied determinations and any other requests to dedicated fax number below belonging to the campus where the patient is receiving treatment   List of dedicated fax numbers for the Facilities:  [address] [place] [address] DENIALS (Administrative/Medical Necessity) [phone]   [address]  (Maternity/NICU/Pediatrics) [phone] [address] [place] [address] Dr Vinny Lacey [address] [id number] [place] [address] [address]ien [address] [phone]

## 2021-08-28 NOTE — DISCHARGE SUMMARY
Discharge Summary    Name: Fabio Gómez  : 1985  MRN: [id number]      Admission Date: 2021   Discharge Date: 21    Principal Problem:    Colitis, acute  Active Problems:    Bipolar 1 disorder (HCC)    Constipation    Anxiety    Benign essential hypertension    GERD (gastroesophageal reflux disease)    Rectal bleeding        Diagnosis: Colitis [K52 9]  Epigastric pain [R10 13]  Weakness [R53 1]    H&P: Per admitting physician H&P, "  Fabio Gómez is a 29 y o  male who presents with caregiver  Caregiver states that patient has been off since Saturday  She states that patient was complaining of abdominal pain and seemed a little more weak  He had decreased appetite along with occasional nausea  Denies vomiting  She had states that last night patient had a large volume episode of diarrhea  She states that the stool was nonbloody did not notice any mucus or foul smell at that time  She states that this was a 1 time episode and has not recurred since  She denies fever and states that patient is the only 1 besides caregivers living in the home      ED Management:  Ancef  CT abdomen pelvis"      Hospital Course: 30yo male with Bipolar 1, HTN, and anxiety admitted for acute colitis  He was started on Rocephin and Flagyl and CT demonstrated "diffuse mild-moderate colonic wall thickening with scattered air-fluid level throughout suggestive of colitis "  During admission, the patient had 2 episodes of blood in loose stool with positive heme-occult  Hemoglobin remained stable at 10 and there were no recurrences of bloody stool  GI was consulted and recommended holding off of anti-diarrheals, obtaining stool studies, and performing endoscopic evaluation if there was no symptomatic improvement or clinical deterioration  Stool studies performed showed positive fecal occult blood, negative for C  Diff  Stool enteric panel and fecal leukocytes are still pending   Patient's clinical status improved and vitals remained stable along with no significant drop in hemoglobin and the patient was determined to be appropriate for discharge  Antibiotics were discontinued at time of discharge with no need for outpatient continuation  Follow-up with PCP was recommended within a week of discharge  On day of discharge, patient was hemodynamically stable and appropriate for discharge home with care-giver  Complications: N/A    Procedures Performed:   Orders Placed This Encounter   Procedures    ED ECG Documentation Only     CT abdomen pelvis with contrast   Final Result by Janki Clark MD (08/25 9140)         1  Diffuse mild-moderate colonic wall thickening with scattered air-fluid levels throughout suggesting colitis  2   Several mildly prominent nonspecific mesenteric lymph nodes, particularly in the right lower quadrant which are presumably reactive, possibly related to colitis  3   Additional findings as noted  The study was marked in [facility] for immediate notification  Workstation performed: [id number]         XR chest 2 views   Final Result by Mi Woods MD (08/25 8330)   Poor inspiration with prominent markings   No acute cardiopulmonary disease  Workstation performed: [id number]               Significant Findings/Abnormal Results with this admission:  · C  Diff: negative   · Stool Enteric Panel: pending  · Fecal leukocytes: pending      Exam on Day of Discharge:   Physical Exam  Vitals reviewed  Constitutional:       Appearance: He is well-developed  HENT:      Head: Normocephalic and atraumatic  Eyes:      Conjunctiva/sclera: Conjunctivae normal    Cardiovascular:      Rate and Rhythm: Normal rate and regular rhythm  Heart sounds: Normal heart sounds  No murmur heard  Pulmonary:      Effort: Pulmonary effort is normal  No respiratory distress  Breath sounds: Normal breath sounds  Abdominal:      Palpations: Abdomen is soft  Tenderness:  There is no abdominal tenderness  Skin:     General: Skin is warm and dry  Neurological:      Mental Status: He is alert  Condition at Discharge: good     Discharge Medications:  See after visit summary for reconciled discharge medications provided to patient and family  Medication changes made with this admission:   · Continue taking antibiotics:  · Metronidazole 500mg tonight and 2 more days = 7 doses total  · Ciprofloxacin 500mg for one more day = 2 more doses    Important Physician Related Follow Up and appointments:   · Please follow-up with PCP and make an appt in 3 days      Discharge instructions/Information to patient and family:   See after visit summary for information provided to patient and family  Provisions for Follow-Up Care:  See after visit summary for information related to follow-up care and any pertinent home health orders  Disposition: Return to group home    Discharge Statement   I spent 30 minutes discharging the patient  This time was spent on the day of discharge  I had direct contact with the patient on the day of discharge  Additional documentation is required if more than 30 minutes were spent on discharge       Planned Readmission: Cami Del Valle 122 PGY1  8/30/2021  6:07 AM

## 2021-08-28 NOTE — PROGRESS NOTES
[address] [facility]  Progress Note - Jessa Hua 1985, 29 y o  male MRN: [id number]  Unit/Bed#: MS [facility] Encounter: [id number]  Primary Care Provider: Volodymyr Frazier PA-C   Date and time admitted to hospital: 8/25/2021 12:11 PM    * Colitis, acute  Assessment & Plan  Improving, no acute symptoms  - Initially had diarrhea after admission with [facility]  - Patient had no diarrhea OVN, no BM since 08/27 in the morning; on exam, patient reports need to have BM  - Continue Flagyl & Ceftriaxone  - C-diff, stool culture & fecal leukocytes ordered, pending BM  - Most likely infectious enterocolitis   - GI recs: continuing abx at this time, monitoring for worsening of status, monitor Hgb and Hct, potentially with scope depending on if clinical deterioration    Rectal bleeding  Assessment & Plan  Acute onset  - Heme occult positive, Hgb of 10 4 today 08/28  - Will continue to monitor Hgb/Hct daily and for signs/symptoms of worsening bleed  - GI following    GERD (gastroesophageal reflux disease)  Assessment & Plan  Continue Protonix while inpatient, home regimen is omeprazole    Benign essential hypertension  Assessment & Plan  Vitals:    08/28/21 0823   BP: 118/81   Pulse:    Resp:    Temp: 97 8 °F (36 6 °C)   SpO2:      BP at goal  - Continue PTA amlodipine    Anxiety  Assessment & Plan  Continue PTA Ativan    Constipation  Assessment & Plan  Hold PTA Colace in the setting of diarrhea  - Restart on discharge if diarrhea has resolved    Bipolar 1 disorder ([facility] [address] )  Assessment & Plan  Continue PTA Depakote, lithium, and Zyprexa        Diet:        Diet Orders   (From admission, onward)             Start     Ordered    08/25/21 1642  Diet Regular; Regular House  Diet effective now     Question Answer Comment   Diet Type Regular    Regular Regular House    RD to adjust diet per protocol?  Yes        08/25/21 1642              Pharmacologic VTE prophylaxis: RX contraindicated due to: acute GI bleed  Mechanical VTE prophylaxis: sequential compression device  Code status: Level 1      Dispo:   Plan discussed with Dr Martino and [facility] Team   - Monitor for BM and subsequent labs, daily CBC while inpatient, possible D/c tomorrow if no more episodes of diarrhea or abdominal pain, will follow-up with GI as well    Subjective:   Patient his caretaker reports that he is doing well today and that he has not had a bowel movement since yesterday morning  His caretaker says that he did eat all of his breakfast this morning and he is hoping that that triggers a bowel movement  The patient denies any acute bleeding from the rectum and any abdominal pain at this time  Caretaker says that he did not complain of any pain overnight  Objective:     Vitals:  Vitals:    08/27/21 0751 08/27/21 1608 08/27/21 2219 08/28/21 0823   BP: 120/76 119/79 119/80 118/81   Pulse:       Resp:       Temp: 98 4 °F (36 9 °C) 97 5 °F (36 4 °C) 97 6 °F (36 4 °C) 97 8 °F (36 6 °C)   TempSrc:       SpO2:       Weight:       Height:             Intake/Output Summary (Last 24 hours) at 8/28/2021 1120  Last data filed at 8/27/2021 1135  Gross per 24 hour   Intake 236 ml   Output --   Net 236 ml       Invasive Devices     Peripheral Intravenous Line            Peripheral IV 08/27/21 Dorsal (posterior); Left Hand <1 day                Physical Exam  Vitals reviewed  Constitutional:       Appearance: Normal appearance  HENT:      Head: Normocephalic and atraumatic  Cardiovascular:      Rate and Rhythm: Normal rate and regular rhythm  Pulmonary:      Effort: Pulmonary effort is normal    Abdominal:      General: Bowel sounds are normal  There is no distension  Palpations: Abdomen is soft  Tenderness: There is no abdominal tenderness  Musculoskeletal:         General: Normal range of motion  Skin:     General: Skin is warm and dry  Neurological:      Mental Status: He is alert  Mental status is at baseline     Psychiatric: Mood and Affect: Mood normal          Labs  Recent Results (from the past 24 hour(s))   CBC and Platelet    Collection Time: 08/28/21  4:51 AM   Result Value Ref Range    WBC 10 08 4 31 - 10 16 Thousand/uL    RBC 4 55 3 88 - 5 62 Million/uL    Hemoglobin 10 4 (L) 12 0 - 17 0 g/dL    Hematocrit 36 7 36 5 - 49 3 %    MCV 81 (L) 82 - 98 fL    MCH 22 9 (L) 26 8 - 34 3 pg    MCHC 28 3 (L) 31 4 - 37 4 g/dL    RDW 16 8 (H) 11 6 - 15 1 %    Platelets 073 710 - 923 Thousands/uL    MPV 13 3 (H) 8 9 - 12 7 fL   Basic metabolic panel    Collection Time: 08/28/21  4:51 AM   Result Value Ref Range    Sodium 141 136 - 145 mmol/L    Potassium 4 1 3 5 - 5 3 mmol/L    Chloride 113 (H) 100 - 108 mmol/L    CO2 28 21 - 32 mmol/L    ANION GAP 0 (L) 4 - 13 mmol/L    BUN 8 5 - 25 mg/dL    Creatinine 0 85 0 60 - 1 30 mg/dL    Glucose 92 65 - 140 mg/dL    Calcium 9 1 8 3 - 10 1 mg/dL    eGFR 113 ml/min/1 73sq m           Current Facility-Administered Medications   Medication Dose Route Frequency    acetaminophen (TYLENOL) tablet 325 mg  325 mg Oral Q4H PRN    amLODIPine (NORVASC) tablet 5 mg  5 mg Oral Daily    bismuth subsalicylate (PEPTO BISMOL) oral suspension 262 mg  262 mg Oral Q6H PRN    ceftriaxone (ROCEPHIN) 1 g/50 mL in dextrose IVPB  1,000 mg Intravenous Q24H    dextromethorphan-guaiFENesin (ROBITUSSIN DM) oral syrup 5 mL  5 mL Oral TID PRN    divalproex sodium (DEPAKOTE ER) 24 hr tablet 500 mg  500 mg Oral Daily    fluticasone (FLONASE) 50 mcg/act nasal spray 1 spray  1 spray Nasal Daily    gabapentin (NEURONTIN) capsule 800 mg  800 mg Oral TID    ibuprofen (MOTRIN) tablet 600 mg  600 mg Oral Q6H PRN    lithium carbonate (LITHOBID) CR tablet 900 mg  900 mg Oral Daily    loratadine (CLARITIN) tablet 5 mg  5 mg Oral Daily    LORazepam (ATIVAN) tablet 1 mg  1 mg Oral BID    metroNIDAZOLE (FLAGYL) IVPB (premix) 500 mg 100 mL  500 mg Intravenous Q8H    OLANZapine (ZyPREXA) tablet 5 mg  5 mg Oral HS    pantoprazole (PROTONIX) EC tablet 40 mg  40 mg Oral Early Morning    phenol (CHLORASEPTIC) 1 4 % mucosal liquid 1 spray  1 spray Mouth/Throat Q2H PRN    sodium chloride 0 9 % infusion  75 mL/hr Intravenous Continuous     No Known Allergies      Imaging/Other:    CT abdomen pelvis with contrast   Final Result by Matti Jacobs MD (08/25 1026)         1  Diffuse mild-moderate colonic wall thickening with scattered air-fluid levels throughout suggesting colitis  2   Several mildly prominent nonspecific mesenteric lymph nodes, particularly in the right lower quadrant which are presumably reactive, possibly related to colitis  3   Additional findings as noted  The study was marked in [facility] for immediate notification  Workstation performed: [id number]         XR chest 2 views   Final Result by Nae Montgomery MD (08/25 1048)   Poor inspiration with prominent markings   No acute cardiopulmonary disease                    Workstation performed: Arsenio Ritter MD  Family Medicine  PGY-3

## 2021-08-29 LAB — C DIFF TOX GENS STL QL NAA+PROBE: NEGATIVE

## 2021-08-29 RX ADMIN — GABAPENTIN 800 MG: 400 CAPSULE ORAL at 08:41

## 2021-08-29 RX ADMIN — METRONIDAZOLE 500 MG: 500 INJECTION, SOLUTION INTRAVENOUS at 08:45

## 2021-08-29 RX ADMIN — LITHIUM CARBONATE 900 MG: 450 TABLET ORAL at 17:29

## 2021-08-29 RX ADMIN — LORAZEPAM 1 MG: 0.5 TABLET ORAL at 17:29

## 2021-08-29 RX ADMIN — GABAPENTIN 800 MG: 400 CAPSULE ORAL at 17:29

## 2021-08-29 RX ADMIN — PANTOPRAZOLE SODIUM 40 MG: 40 TABLET, DELAYED RELEASE ORAL at 05:22

## 2021-08-29 RX ADMIN — OLANZAPINE 5 MG: 5 TABLET, FILM COATED ORAL at 21:38

## 2021-08-29 RX ADMIN — DIVALPROEX SODIUM 500 MG: 500 TABLET, EXTENDED RELEASE ORAL at 17:36

## 2021-08-29 RX ADMIN — CEFTRIAXONE 1000 MG: 1 INJECTION, POWDER, FOR SOLUTION INTRAMUSCULAR; INTRAVENOUS at 05:22

## 2021-08-29 RX ADMIN — LORATADINE 5 MG: 10 TABLET ORAL at 08:41

## 2021-08-29 RX ADMIN — AMLODIPINE BESYLATE 5 MG: 5 TABLET ORAL at 08:41

## 2021-08-29 RX ADMIN — METRONIDAZOLE 500 MG: 500 INJECTION, SOLUTION INTRAVENOUS at 01:44

## 2021-08-29 RX ADMIN — LORAZEPAM 1 MG: 0.5 TABLET ORAL at 12:20

## 2021-08-29 RX ADMIN — METRONIDAZOLE 500 MG: 500 INJECTION, SOLUTION INTRAVENOUS at 17:30

## 2021-08-29 RX ADMIN — GABAPENTIN 800 MG: 400 CAPSULE ORAL at 21:38

## 2021-08-29 RX ADMIN — SODIUM CHLORIDE 75 ML/HR: 0.9 INJECTION, SOLUTION INTRAVENOUS at 08:37

## 2021-08-29 NOTE — QUICK NOTE
Patient appears stable with stable hemoglobin  Okay to discharge from gastroenterology standpoint when deemed appropriate by primary team   No further inpatient gastroenterology workup  Gastroenterology will sign off

## 2021-08-29 NOTE — QUICK NOTE
Talked to patient and his aid at bedside  According to aid patient had 2 non-bloody BM since the morning  The aid also notes that the patient has not been back to his baseline appetite and eating habits yet  No other acute concerns or questions

## 2021-08-29 NOTE — PROGRESS NOTES
[facility]  Progress Note - Kulwant Isaacs 1985, 29 y o  male MRN: [id number]  Unit/Bed#: -01 Encounter: [id number]  Primary Care Provider: Tianna Waddell PA-C   Date and time admitted to hospital: 8/25/2021 12:11 PM           Assessment and Plan  Acute Colitis   - Asymptomatic   - No bm yesterday, 1 episode of a greenish bowel movement this morning common no associated pain, fever, chill    - C diff,  enteric panel pending result   - likely infectious in nature  - Plan  Continue Rocephin and Flagyl day 3 (started on 8/26),     Clinically much improved, per discussion  with GI, pt is stable to be discharged tomorrow  Rectal Bleeding  - Heme occult positive  - Pt doesn't appear anemic   - 1 bm today, no blood tint   - f/u with GI post discharge to assess for need of scoping   GERD   - Cont' Protonix inpt, pt has home regimen Omeprazole  Benign essential Hypertension   - Cont' PTA amlodipine   Anxiety  - cont' PTA Ativan   Constipation   - no bm yesterday  - 1 bm today, greenish color     VTE Mechanical Prophylaxis: sequential compression device  Diet:  Code Status:  Level 1  Disposition:  - Pt was stable today, planned for discharge >> problem with health care facility where pt will be discharged to "no discharge over the weekend" >> hold off discharge today, will d/c tomorrow   - One greenish colored bm this morning, no c/o of abdominal pain  - Will reach out GI per d/c condition/ follow up     Discussed with attending physician, Dr Paige Cason, and [facility] team     Subjective:   Pt was seen and examinated at bed site this morning  No acute c/o over night  Pt didn't have bm yesterday, however just had one bm prior to this morning encounter, stool was green w/o tint of blood  Pt reported no abdominal pain  Pt was afebrile and stable, cont' Rocephin and Flagyl day 3   Pt is in stable condition and fit for dc but holding off due to problem with health care facility, will plan for d/c tomorrow  Pt denied fever, chill, n/v      Objective:     Vitals: Blood pressure 127/83, pulse 83, temperature 98 2 °F (36 8 °C), resp  rate 16, height 5' 9" (1 753 m), weight 102 kg (225 lb 5 oz), SpO2 99 %  ,Body mass index is 33 27 kg/m²  Intake/Output Summary (Last 24 hours) at 8/29/2021 1111  Last data filed at 8/28/2021 1700  Gross per 24 hour   Intake 699 ml   Output --   Net 699 ml       Physical Exam  Constitutional:       Appearance: Normal appearance  HENT:      Head: Normocephalic and atraumatic  Right Ear: External ear normal       Left Ear: External ear normal       Mouth/Throat:      Mouth: Mucous membranes are moist    Cardiovascular:      Rate and Rhythm: Normal rate and regular rhythm  Pulses: Normal pulses  Heart sounds: Normal heart sounds  No murmur heard  No gallop  Pulmonary:      Effort: Pulmonary effort is normal       Breath sounds: Normal breath sounds  No wheezing or rhonchi  Abdominal:      General: Abdomen is flat  Bowel sounds are normal  There is no distension  Palpations: Abdomen is soft  Tenderness: There is no abdominal tenderness  There is no guarding  Musculoskeletal:         General: No swelling, tenderness, deformity or signs of injury  Cervical back: Normal range of motion  Left lower leg: No edema  Skin:     General: Skin is warm  Capillary Refill: Capillary refill takes less than 2 seconds  Neurological:      Mental Status: He is alert  Mental status is at baseline  Psychiatric:         Mood and Affect: Mood normal          Behavior: Behavior normal          Invasive Devices     Peripheral Intravenous Line            Peripheral IV 08/27/21 Dorsal (posterior); Left Hand 1 day                           Lab and other studies:  I have personally reviewed pertinent reports       Admission on 08/25/2021   Component Date Value    WBC 08/25/2021 12 16*    RBC 08/25/2021 5 24     Hemoglobin 08/25/2021 12 0  Hematocrit 08/25/2021 41 2     MCV 08/25/2021 79*    MCH 08/25/2021 22 9*    MCHC 08/25/2021 29 1*    RDW 08/25/2021 16 2*    MPV 08/25/2021 11 5     Platelets 82/63/3139 251     nRBC 08/25/2021 0     Neutrophils Relative 08/25/2021 62     Immat GRANS % 08/25/2021 0     Lymphocytes Relative 08/25/2021 15     Monocytes Relative 08/25/2021 21*    Eosinophils Relative 08/25/2021 1     Basophils Relative 08/25/2021 1     Neutrophils Absolute 08/25/2021 7 50     Immature Grans Absolute 08/25/2021 0 05     Lymphocytes Absolute 08/25/2021 1 78     Monocytes Absolute 08/25/2021 2 60*    Eosinophils Absolute 08/25/2021 0 17     Basophils Absolute 08/25/2021 0 06     Sodium 08/25/2021 139     Potassium 08/25/2021 3 9     Chloride 08/25/2021 108     CO2 08/25/2021 25     ANION GAP 08/25/2021 6     BUN 08/25/2021 13     Creatinine 08/25/2021 0 83     Glucose 08/25/2021 90     Calcium 08/25/2021 8 9     Corrected Calcium 08/25/2021 9 8     AST 08/25/2021 35     ALT 08/25/2021 58     Alkaline Phosphatase 08/25/2021 56     Total Protein 08/25/2021 8 0     Albumin 08/25/2021 2 9*    Total Bilirubin 08/25/2021 0 35     eGFR 08/25/2021 114     Lipase 08/25/2021 66*    Valproic Acid, Total 08/25/2021 32*    SARS-CoV-2 08/25/2021 Negative     Ventricular Rate 08/25/2021 101     Atrial Rate 08/25/2021 101     GA Interval 08/25/2021 166     QRSD Interval 08/25/2021 142     QT Interval 08/25/2021 374     QTC Interval 08/25/2021 484     P Axis 08/25/2021 26     QRS Axis 08/25/2021 95     T Wave Axis 08/25/2021 5     Troponin I 08/25/2021 <0 02     Color, UA 08/25/2021 Yellow     Clarity, UA 08/25/2021 Clear     Specific Gravity, UA 08/25/2021 1 022     pH, UA 08/25/2021 6 5     Leukocytes, UA 08/25/2021 Negative     Nitrite, UA 08/25/2021 Negative     Protein, UA 08/25/2021 Negative     Glucose, UA 08/25/2021 Negative     Ketones, UA 08/25/2021 Negative     Urobilinogen, UA 08/25/2021 0 2     Bilirubin, UA 08/25/2021 Negative     Blood, UA 08/25/2021 Negative     WBC 08/26/2021 10 21*    RBC 08/26/2021 4 61     Hemoglobin 08/26/2021 10 6*    Hematocrit 08/26/2021 36 1*    MCV 08/26/2021 78*    MCH 08/26/2021 23 0*    MCHC 08/26/2021 29 4*    RDW 08/26/2021 16 2*    MPV 08/26/2021 12 0     Platelets 51/29/6148 241     nRBC 08/26/2021 0     Neutrophils Relative 08/26/2021 56     Immat GRANS % 08/26/2021 1     Lymphocytes Relative 08/26/2021 18     Monocytes Relative 08/26/2021 22*    Eosinophils Relative 08/26/2021 2     Basophils Relative 08/26/2021 1     Neutrophils Absolute 08/26/2021 5 69     Immature Grans Absolute 08/26/2021 0 09     Lymphocytes Absolute 08/26/2021 1 85     Monocytes Absolute 08/26/2021 2 27*    Eosinophils Absolute 08/26/2021 0 25     Basophils Absolute 08/26/2021 0 06     Sodium 08/26/2021 138     Potassium 08/26/2021 3 7     Chloride 08/26/2021 108     CO2 08/26/2021 26     ANION GAP 08/26/2021 4     BUN 08/26/2021 8     Creatinine 08/26/2021 0 77     Glucose 08/26/2021 95     Calcium 08/26/2021 9 0     eGFR 08/26/2021 118     Fecal Occult Blood Diagn* 08/26/2021 Positive*    Fecal Occult Blood Diagn* 08/26/2021 Test not performed     Fecal Occult Blood Diagn* 08/26/2021 Test not performed     Sodium 08/27/2021 142     Potassium 08/27/2021 4 0     Chloride 08/27/2021 111*    CO2 08/27/2021 27     ANION GAP 08/27/2021 4     BUN 08/27/2021 9     Creatinine 08/27/2021 0 74     Glucose 08/27/2021 103     Calcium 08/27/2021 8 8     eGFR 08/27/2021 120     WBC 08/27/2021 10 33*    RBC 08/27/2021 4 72     Hemoglobin 08/27/2021 10 9*    Hematocrit 08/27/2021 37 6     MCV 08/27/2021 80*    MCH 08/27/2021 23 1*    MCHC 08/27/2021 29 0*    RDW 08/27/2021 16 2*    Platelets 36/31/4706 254     MPV 08/27/2021 11 7     WBC 08/28/2021 10 08     RBC 08/28/2021 4 55     Hemoglobin 08/28/2021 10 4*    Hematocrit 08/28/2021 36 7     MCV 08/28/2021 81*    MCH 08/28/2021 22 9*    MCHC 08/28/2021 28 3*    RDW 08/28/2021 16 8*    Platelets 48/65/0422 273     MPV 08/28/2021 13 3*    Sodium 08/28/2021 141     Potassium 08/28/2021 4 1     Chloride 08/28/2021 113*    CO2 08/28/2021 28     ANION GAP 08/28/2021 0*    BUN 08/28/2021 8     Creatinine 08/28/2021 0 85     Glucose 08/28/2021 92     Calcium 08/28/2021 9 1     eGFR 08/28/2021 113        No results found for this or any previous visit (from the past 24 hour(s))  Blood Culture: No results found for: BLOODCX,   Urinalysis:   Lab Results   Component Value Date    COLORU Yellow 08/25/2021    CLARITYU Clear 08/25/2021    SPECGRAV 1 022 08/25/2021    PHUR 6 5 08/25/2021    PHUR 6 0 08/29/2017    LEUKOCYTESUR Negative 08/25/2021    NITRITE Negative 08/25/2021    GLUCOSEU Negative 08/25/2021    KETONESU Negative 08/25/2021    BILIRUBINUR Negative 08/25/2021    BLOODU Negative 08/25/2021   ,   Urine Culture: No results found for: URINECX,   Wound Culure: No results found for: WOUNDCULT      XR chest 2 views    Result Date: 8/25/2021  Poor inspiration with prominent markings No acute cardiopulmonary disease  Workstation performed: [id number]     CT abdomen pelvis with contrast    Result Date: 8/25/2021  1  Diffuse mild-moderate colonic wall thickening with scattered air-fluid levels throughout suggesting colitis  2   Several mildly prominent nonspecific mesenteric lymph nodes, particularly in the right lower quadrant which are presumably reactive, possibly related to colitis  3   Additional findings as noted  The study was marked in [facility] for immediate notification   Workstation performed: [id number]        Current Facility-Administered Medications   Medication Dose Route Frequency    acetaminophen (TYLENOL) tablet 325 mg  325 mg Oral Q4H PRN    amLODIPine (NORVASC) tablet 5 mg  5 mg Oral Daily    bismuth subsalicylate (PEPTO BISMOL) oral suspension 262 mg  262 mg Oral Q6H PRN    ceftriaxone (ROCEPHIN) 1 g/50 mL in dextrose IVPB  1,000 mg Intravenous Q24H    dextromethorphan-guaiFENesin (ROBITUSSIN DM) oral syrup 5 mL  5 mL Oral TID PRN    divalproex sodium (DEPAKOTE ER) 24 hr tablet 500 mg  500 mg Oral Daily    fluticasone (FLONASE) 50 mcg/act nasal spray 1 spray  1 spray Nasal Daily    gabapentin (NEURONTIN) capsule 800 mg  800 mg Oral TID    ibuprofen (MOTRIN) tablet 600 mg  600 mg Oral Q6H PRN    lithium carbonate (LITHOBID) CR tablet 900 mg  900 mg Oral Daily    loratadine (CLARITIN) tablet 5 mg  5 mg Oral Daily    LORazepam (ATIVAN) tablet 1 mg  1 mg Oral BID    metroNIDAZOLE (FLAGYL) IVPB (premix) 500 mg 100 mL  500 mg Intravenous Q8H    OLANZapine (ZyPREXA) tablet 5 mg  5 mg Oral HS    pantoprazole (PROTONIX) EC tablet 40 mg  40 mg Oral Early Morning    phenol (CHLORASEPTIC) 1 4 % mucosal liquid 1 spray  1 spray Mouth/Throat Q2H PRN    sodium chloride 0 9 % infusion  75 mL/hr Intravenous Continuous             Brittany Chill, DO PGY-1  Family Medicine

## 2021-08-30 VITALS
HEIGHT: 69 IN | BODY MASS INDEX: 33.37 KG/M2 | TEMPERATURE: 97.7 F | OXYGEN SATURATION: 97 % | DIASTOLIC BLOOD PRESSURE: 92 MMHG | SYSTOLIC BLOOD PRESSURE: 145 MMHG | HEART RATE: 71 BPM | WEIGHT: 225.31 LBS | RESPIRATION RATE: 16 BRPM

## 2021-08-30 PROBLEM — K62.5 RECTAL BLEEDING: Status: RESOLVED | Noted: 2021-08-27 | Resolved: 2021-08-30

## 2021-08-30 PROBLEM — K59.00 CONSTIPATION: Status: RESOLVED | Noted: 2018-04-24 | Resolved: 2021-08-30

## 2021-08-30 LAB
CAMPYLOBACTER DNA SPEC NAA+PROBE: DETECTED
SALMONELLA DNA SPEC QL NAA+PROBE: ABNORMAL
SHIGA TOXIN STX GENE SPEC NAA+PROBE: ABNORMAL
SHIGELLA DNA SPEC QL NAA+PROBE: ABNORMAL

## 2021-08-30 PROCEDURE — 99238 HOSP IP/OBS DSCHRG MGMT 30/<: CPT | Performed by: FAMILY MEDICINE

## 2021-08-30 RX ORDER — METRONIDAZOLE 500 MG/1
500 TABLET ORAL
Qty: 7 TABLET | Refills: 0 | Status: SHIPPED | OUTPATIENT
Start: 2021-08-30 | End: 2021-08-30 | Stop reason: HOSPADM

## 2021-08-30 RX ORDER — CIPROFLOXACIN 500 MG/1
500 TABLET, FILM COATED ORAL 2 TIMES DAILY WITH MEALS
Qty: 2 TABLET | Refills: 0 | Status: SHIPPED | OUTPATIENT
Start: 2021-08-30 | End: 2021-08-31

## 2021-08-30 RX ADMIN — METRONIDAZOLE 500 MG: 500 INJECTION, SOLUTION INTRAVENOUS at 09:42

## 2021-08-30 RX ADMIN — AMLODIPINE BESYLATE 5 MG: 5 TABLET ORAL at 09:40

## 2021-08-30 RX ADMIN — PANTOPRAZOLE SODIUM 40 MG: 40 TABLET, DELAYED RELEASE ORAL at 06:43

## 2021-08-30 RX ADMIN — METRONIDAZOLE 500 MG: 500 INJECTION, SOLUTION INTRAVENOUS at 00:55

## 2021-08-30 RX ADMIN — FLUTICASONE PROPIONATE 1 SPRAY: 50 SPRAY, METERED NASAL at 09:43

## 2021-08-30 RX ADMIN — LORAZEPAM 1 MG: 0.5 TABLET ORAL at 14:14

## 2021-08-30 RX ADMIN — LORAZEPAM 1 MG: 0.5 TABLET ORAL at 09:00

## 2021-08-30 RX ADMIN — LORATADINE 5 MG: 10 TABLET ORAL at 09:40

## 2021-08-30 RX ADMIN — GABAPENTIN 800 MG: 400 CAPSULE ORAL at 09:40

## 2021-08-30 RX ADMIN — CEFTRIAXONE 1000 MG: 1 INJECTION, POWDER, FOR SOLUTION INTRAMUSCULAR; INTRAVENOUS at 06:43

## 2021-08-30 RX ADMIN — SODIUM CHLORIDE 75 ML/HR: 0.9 INJECTION, SOLUTION INTRAVENOUS at 00:04

## 2021-08-30 NOTE — UTILIZATION REVIEW
Inpatient Admission Authorization Request   NOTIFICATION OF INPATIENT ADMISSION/INPATIENT AUTHORIZATION REQUEST   SERVICING FACILITY:   [facility]  Address: [address] [place] [address], [address] [place] [address]  Tax ID: [id number]  NPI: [id number]  Place of Service: Inpatient [address] [facility] Code: 24     ATTENDING PROVIDER:  Attending Name and NPI#: Yonathan Llamas [[id number]]  Address: [address] [place] [address], [address] [place] [address]  Phone: [phone]     UTILIZATION REVIEW CONTACT:  Darin Lopez Utilization   Network Utilization Review Department  Phone: [phone]  Fax: [phone]  Email: [email or web address]     PHYSICIAN ADVISORY SERVICES:  FOR [id number] REVIEW - MEDICAL NECESSITY DENIAL  Phone: [phone]  Fax: [phone]  Email: [email or web address]     TYPE OF REQUEST:  Inpatient Status     ADMISSION INFORMATION:  ADMISSION DATE/TIME: 8/27/21  9:10 AM  PATIENT DIAGNOSIS CODE/DESCRIPTION:  Colitis [K52 9]  Epigastric pain [R10 13]  Weakness [R53 1]  DISCHARGE DATE/TIME: No discharge date for patient encounter  DISCHARGE DISPOSITION (IF DISCHARGED): Home/Self Care     IMPORTANT INFORMATION:  Please contact the Darin Lopez directly with any questions or concerns regarding this request  Department voicemails are confidential     Send requests for admission clinical reviews, concurrent reviews, approvals, and administrative denials due to lack of clinical to fax [phone]

## 2021-08-30 NOTE — QUICK NOTE
Patient seen at bedside during evening rounds  He is resting comfortably in bed in no acute distress with his caregiver at bedside  Patient and caregiver report no concerns at this time  Plan for likely discharge tomorrow

## 2021-08-30 NOTE — CASE MANAGEMENT
TOBIAS confirmed with Makenzie at pt's group home that staff has come to pick pt up and they spoke with Sergio

## 2021-08-30 NOTE — CASE MANAGEMENT
CM received a message from Ana Maria Israel at [employer] [facility] that they will need AVS faxed to f: [phone] for review prior to arranging transport  CM informed Family Medicine Dr Massiel Boone that once AVS is completed, CM will fax accordingly

## 2021-08-30 NOTE — CASE MANAGEMENT
CM informed pt stable for DC    CM left message for Juan M Diaz, director of [facility] [phone], and Chelsea Zuluaga RN  ([phone]) informing them of the above

## 2021-08-31 ENCOUNTER — TRANSITIONAL CARE MANAGEMENT (OUTPATIENT)
Dept: FAMILY MEDICINE CLINIC | Facility: CLINIC | Age: 36
End: 2021-08-31

## 2021-08-31 LAB — WBC SPEC QL GRAM STN: ABNORMAL

## 2021-08-31 NOTE — UTILIZATION REVIEW
Notification of Discharge   This is a Notification of Discharge from our facility [address]  Please be advised that this patient has been discharge from our facility  Below you will find the admission and discharge date and time including the patients disposition  UTILIZATION REVIEW CONTACT:  Lobito Cazares  Utilization   Network Utilization Review Department  Phone: [phone] x carefully listen to the prompts  All voicemails are confidential   Email: [email or web address]     PHYSICIAN ADVISORY SERVICES:  FOR [id number] REVIEW - MEDICAL NECESSITY DENIAL  Phone: [phone]  Fax: [phone]  Email: [email or web address]     PRESENTATION DATE: 8/25/2021 12:11 PM  OBERVATION ADMISSION DATE:   INPATIENT ADMISSION DATE: 8/27/21  9:10 AM   DISCHARGE DATE: 8/30/2021  6:33 PM  DISPOSITION: Home/Self Care Home/Self Care      IMPORTANT INFORMATION:  Send all requests for admission clinical reviews, approved or denied determinations and any other requests to dedicated fax number below belonging to the campus where the patient is receiving treatment   List of dedicated fax numbers:  [address] DENIALS (Administrative/Medical Necessity) [phone]   [address] (Maternity/NICU/Pediatrics) [phone]   Griffin Mann [phone]   Marcial Vazquez [phone]   Jose Falcon [phone]   Saima Lundberg [address] [phone]   [facility]  [phone]   [address] [facility]  [address] [facility] 1000 [facility] [phone]

## 2021-09-01 NOTE — UTILIZATION REVIEW
Initial Clinical Review    Admission: Date/Time/Statement:   Admission Orders (From admission, onward)     Ordered        08/27/21 0910  Inpatient Admission  Once         08/25/21 1629  Place in Observation  Once         08/25/21 1628  Place in Observation  Once                   Orders Placed This Encounter   Procedures    Place in Observation     Standing Status:   Standing     Number of Occurrences:   1     Order Specific Question:   Level of Care     Answer:   Med Surg [16]    Place in Observation     Standing Status:   Standing     Number of Occurrences:   1     Order Specific Question:   Level of Care     Answer:   Med Surg [16]    Inpatient Admission     Standing Status:   Standing     Number of Occurrences:   1     Order Specific Question:   Level of Care     Answer:   Med Surg [16]     Order Specific Question:   Estimated length of stay     Answer:   More than 2 Midnights     Order Specific Question:   Certification     Answer:   I certify that inpatient services are medically necessary for this patient for a duration of greater than two midnights  See H&P and MD Progress Notes for additional information about the patient's course of treatment  ED Arrival Information     Expected Arrival Acuity    - 8/25/2021 11:41 Urgent         Means of arrival Escorted by Service Admission type    Walk-In Self General Medicine Urgent         Arrival complaint    Diarrhea/Weakness        Chief Complaint   Patient presents with    Abdominal Pain     Pt c/o epigastric pain since Sunday, diarrhea last night  Initial Presentation:     29year old male presents to ed from home for epigastric pain with diarrhea  Clinical assessment significant for wbc 12 16, imaging shows colitis  Treated in ed with iv ns bolus, iv ancef, po protonix, iv flagyl, iv ceftriaxone  Date: 8-26-21  Day 2: observation  Abdominal pain improved  Tolerating diet    Care giver reports patient had frequent trips to bathroom overnight however bowel movements not witnessed by anyone else  Patient is afebrile  Continue to monitor bowel movements  Continue iv antibiotics  Stool studies sent  Repeat BMP  Keep stool chart  ED Triage Vitals   08/25/21 1213 08/25/21 1213 08/25/21 1213 08/25/21 1213 08/25/21 1213   98 3 °F (36 8 °C) 105 18 130/86 98 %      Oral Monitor         Pain Score       4          08/25/21 102 kg (225 lb 5 oz)     Additional Vital Signs:     Date/Time  Temp  Pulse  Resp  BP  MAP   SpO2  O2 Device   08/26/21 13:13:51  98 9 °F (37 2°C)  83  16  127/89  102  99 %  None (Room air)   08/26/21 1313  --  --  --  --  --  97 %  None (Room air)   08/26/21 0800  --  84  18  142/112  Abnormal   124  99 %  --   08/26/21 0225  --  94  18  129/81  --  97 %  None (Room air)   08/25/21 2015  --  102  18  140/78  --  97 %  None (Room air)   08/25/21 1556  --  102  18  124/89  --  99 %  None (Room air)             Pertinent Labs/Diagnostic Test Results:     Collection Time Result Time Vent R Atrial R IA Int  QRSD Int  QT Int  QTC Int  P Axis QRS Axis T Wave Ax    08/25/21 12:51:22 08/25/21 13:33:58 101 101 166 142 374 484 26 95 5                     Sinus tachycardia   Right bundle branch block   Abnormal ECG   When compared with ECG of 11-MAY-2016 14:20,   Right bundle branch block is now Present                     CT abdomen pelvis with contrast   Final  (08/25 1530)         1  Diffuse mild-moderate colonic wall thickening with scattered air-fluid levels throughout suggesting colitis  2   Several mildly prominent nonspecific mesenteric lymph nodes, particularly in the right lower quadrant which are presumably reactive, possibly related to colitis  3   Additional findings as noted  XR chest 2 views   Final  (08/25 1548)   Poor inspiration with prominent markings   No acute cardiopulmonary disease          Results from last 7 days   Lab Units 08/25/21  1256   SARS-COV-2  Negative     Results from last 7 days   Lab Units 08/28/21  0451 08/27/21  1045 08/26/21  0736 08/25/21  1256   WBC Thousand/uL 10 08 10 33* 10 21* 12 16*   HEMOGLOBIN g/dL 10 4* 10 9* 10 6* 12 0   HEMATOCRIT % 36 7 37 6 36 1* 41 2   PLATELETS Thousands/uL 273 254 241 251   NEUTROS ABS Thousands/µL  --   --  5 69 7 50         Results from last 7 days   Lab Units 08/28/21  0451 08/27/21  0441 08/26/21  1308 08/25/21  1256   SODIUM mmol/L 141 142 138 139   POTASSIUM mmol/L 4 1 4 0 3 7 3 9   CHLORIDE mmol/L 113* 111* 108 108   CO2 mmol/L 28 27 26 25   ANION GAP mmol/L 0* 4 4 6   BUN mg/dL 8 9 8 13   CREATININE mg/dL 0 85 0 74 0 77 0 83   EGFR ml/min/1 73sq m 113 120 118 114   CALCIUM mg/dL 9 1 8 8 9 0 8 9     Results from last 7 days   Lab Units 08/25/21  1256   AST U/L 35   ALT U/L 58   ALK PHOS U/L 56   TOTAL PROTEIN g/dL 8 0   ALBUMIN g/dL 2 9*   TOTAL BILIRUBIN mg/dL 0 35         Results from last 7 days   Lab Units 08/28/21  0451 08/27/21  0441 08/26/21  1308 08/25/21  1256   GLUCOSE RANDOM mg/dL 92 103 95 90         Results from last 7 days   Lab Units 08/25/21  1421   TROPONIN I ng/mL <0 02         Results from last 7 days   Lab Units 08/25/21  1256   LIPASE u/L 66*         Results from last 7 days   Lab Units 08/25/21  1439   CLARITY UA  Clear   COLOR UA  Yellow   SPEC GRAV UA  1 022   PH UA  6 5   GLUCOSE UA mg/dl Negative   KETONES UA mg/dl Negative   BLOOD UA  Negative   PROTEIN UA mg/dl Negative   NITRITE UA  Negative   BILIRUBIN UA  Negative   UROBILINOGEN UA E U /dl 0 2   LEUKOCYTES UA  Negative       ED Treatment:   Medication Administration from 08/25/2021 1141 to 08/26/2021 1101       Date/Time Order Dose Route Action     08/25/2021 1551 sodium chloride 0 9 % bolus 1,000 mL 1,000 mL Intravenous New Bag     08/25/2021 1551 ceFAZolin (ANCEF) IVPB (premix in dextrose) 2,000 mg 50 mL 2,000 mg Intravenous New Bag     08/26/2021 0828 gabapentin (NEURONTIN) capsule 800 mg 800 mg Oral Given     08/26/2021 0888 LORazepam (ATIVAN) tablet 1 mg 1 mg Oral Given     08/25/2021 1955 LORazepam (ATIVAN) tablet 1 mg 1 mg Oral Given     08/25/2021 2214 OLANZapine (ZyPREXA) tablet 5 mg 5 mg Oral Given     08/26/2021 0828 amLODIPine (NORVASC) tablet 5 mg 5 mg Oral Given     08/25/2021 1959 amLODIPine (NORVASC) tablet 5 mg 5 mg Oral Given     08/26/2021 0523 pantoprazole (PROTONIX) EC tablet 40 mg 40 mg Oral Given     08/25/2021 1955 docusate sodium (COLACE) capsule 100 mg 100 mg Oral Given     08/26/2021 0833 metroNIDAZOLE (FLAGYL) IVPB (premix) 500 mg 100 mL 500 mg Intravenous New Bag     08/25/2021 1955 metroNIDAZOLE (FLAGYL) IVPB (premix) 500 mg 100 mL 500 mg Intravenous New Bag     08/26/2021 0523 ceftriaxone (ROCEPHIN) 1 g/50 mL in dextrose IVPB 1,000 mg Intravenous New Bag        Past Medical History:   Diagnosis Date    ADHD (attention deficit hyperactivity disorder)     Atypical pervasive developmental disorder     Autism     [facility]     Constipation     Depression     Head-banging     Last Assessed:  9/1/17    Hydronephrosis 9/19/2019    Hyperlipidemia     Hypertension     Intermittent explosive disorder     Oppositional defiant disorder     Personality disorder [facility]     Schizophrenia [facility]     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[facility]     Seizures ([facility]     Sleep disorder     Vitamin D insufficiency     Last Assessed:  6/22/17     Present on Admission:   Bipolar 1 disorder ([facility]   (Resolved) Constipation   Benign essential hypertension   GERD (gastroesophageal reflux disease)   (Resolved) Anxiety      Admitting Diagnosis:     Colitis [K52 9]  Epigastric pain [R10 13]  Weakness [R53 1]    Age/Sex: 29 y o  male    Scheduled Medications:    No current facility-administered medications for this encounter  Continuous IV Infusions:  No current facility-administered medications for this encounter  PRN Meds:  No current facility-administered medications for this encounter        IP CONSULT TO GASTROENTEROLOGY    Network Utilization Review Department  ATTENTION: Please call with any questions or concerns to [phone] and carefully listen to the prompts so that you are directed to the right person  All voicemails are confidential   Claudbrian Patient all requests for admission clinical reviews, approved or denied determinations and any other requests to dedicated fax number below belonging to the campus where the patient is receiving treatment  List of dedicated fax numbers for the Facilities:  [address] DENIALS (Administrative/Medical Necessity) [phone]   [address] (Maternity/NICU/Pediatrics) [phone]   [address] [place] [address] [address] [place] [facility] [address] [id number] [address]  Cleopatra Salas [address] [facility] [place] [address] [phone]         Initial Clinical Review    Admission: Date/Time/Statement:  Observation 8/25 @ 1628 and changed to Inpatient on 8/27 @ 0910  Pt requiring continued stay for treatment of Acute colitis/ + Heme occult and Iv antibiotics        Ordered        08/27/21 0910  Inpatient Admission  Once         08/25/21 1628  Place in Observation  Once                   Orders Placed This Encounter   Procedures    Inpatient Admission     Standing Status:   Standing     Number of Occurrences:   1     Order Specific Question:   Level of Care     Answer:   Med Surg [16]     Order Specific Question:   Estimated length of stay     Answer:   More than 2 Midnights     Order Specific Question:   Certification     Answer:   I certify that inpatient services are medically necessary for this patient for a duration of greater than two midnights  See H&P and MD Progress Notes for additional information about the patient's course of treatment  ED Arrival Information     Expected Arrival Acuity    - 8/25/2021 11:41 Urgent         Means of arrival Escorted by Service Admission type    Walk-In Self General Medicine Urgent         Arrival complaint    Diarrhea/Weakness        Chief Complaint   Patient presents with    Abdominal Pain     Pt c/o epigastric pain since Sunday, diarrhea last night  Initial Presentation:   29y Male to ED presents with abdominal pain, weakness, decreased appetite and nausea  Per caregiver, pt has been "off" since Saturday and large volume episode of diarrhea, non bloody  PMH for GERD, HTN , Anxiety, Constipation and Bipolar 1 disorder  Admit Observation level of care for Acute Colitis  Given Iv antibiotics in ED and continue  Resume diet as tolerated  Continue home meds  8/25 CT AP: Diffuse mild-moderate colonic wall thickening with scattered air-fluid levels throughout suggesting colitis  Several mildly prominent nonspecific mesenteric lymph nodes, particularly in the right lower quadrant which are presumably reactive, possibly related to colitis    8/26 Progress notes; Continue Iv antibiotics  Failed to show stool to nurse before flushing  Per caregiver, pt went to bathroom 11 times, unsure if he had stools each time but caregiver saw bld on toilet seat x1     8/27 Progress notes; Pt with 2 episodes of liquid bld per rectum  Heme occult positive  Continue Iv antibiotics  C diff, stool culture and fecal leukocytes pending  GI consult  Pt c/o lower middle abdominal pain      Date: 8/28   Day 2:    ED Triage Vitals   08/25/21 1213 08/25/21 1213 08/25/21 1213 08/25/21 1213 08/25/21 1213   98 3 °F (36 8 °C) 105 18 130/86 98 %      Oral Monitor         Pain Score       4          08/25/21 102 kg (225 lb 5 oz)     Additional Vital Signs:   08/27/21 07:51:05  98 4 °F (36 9 °C)  --  --  120/76  91  --  --     Date/Time  Temp  Pulse  Resp  BP  MAP   SpO2  O2 Device   08/26/21 13:13:51  98 9 °F (37 2°C)  83  16  127/89  102  99 %  None (Room air)   08/26/21 1313  --  --  --  --  --  97 %  None (Room air)   08/26/21 0800  --  84  18  142/112  Abnormal   124  99 %  --   08/26/21 0225  --  94  18  129/81  --  97 %  None (Room air)   08/25/21 2015  --  102  18  140/78  --  97 %  None (Room air)   08/25/21 1556  --  102  18  124/89  --  99 %  None (Room air)       Pertinent Labs/Diagnostic Test Results:     Collection Time Result Time Vent R Atrial R TN Int  QRSD Int  QT Int  QTC Int  P Axis QRS Axis T Wave Ax    08/25/21 12:51:22 08/25/21 13:33:58 101 101 166 142 374 484 26 95 5                     Sinus tachycardia   Right bundle branch block   Abnormal ECG   When compared with ECG of 11-MAY-2016 14:20,   Right bundle branch block is now Present                 CT abdomen pelvis with contrast   Final  (08/25 1530)         1  Diffuse mild-moderate colonic wall thickening with scattered air-fluid levels throughout suggesting colitis  2   Several mildly prominent nonspecific mesenteric lymph nodes, particularly in the right lower quadrant which are presumably reactive, possibly related to colitis  3   Additional findings as noted  XR chest 2 views   Final  (08/25 1548)   Poor inspiration with prominent markings   No acute cardiopulmonary disease          Results from last 7 days   Lab Units 08/25/21  1256   SARS-COV-2  Negative     Results from last 7 days   Lab Units 08/28/21  0451 08/27/21  1045 08/26/21  0736 08/25/21  1256   WBC Thousand/uL 10 08 10 33* 10 21* 12 16*   HEMOGLOBIN g/dL 10 4* 10 9* 10 6* 12 0   HEMATOCRIT % 36 7 37 6 36 1* 41 2   PLATELETS Thousands/uL 273 254 241 251   NEUTROS ABS Thousands/µL  --   --  5 69 7 50         Results from last 7 days   Lab Units 08/28/21  0451 08/27/21  0441 08/26/21  1308 08/25/21  1259 SODIUM mmol/L 141 142 138 139   POTASSIUM mmol/L 4 1 4 0 3 7 3 9   CHLORIDE mmol/L 113* 111* 108 108   CO2 mmol/L 28 27 26 25   ANION GAP mmol/L 0* 4 4 6   BUN mg/dL 8 9 8 13   CREATININE mg/dL 0 85 0 74 0 77 0 83   EGFR ml/min/1 73sq m 113 120 118 114   CALCIUM mg/dL 9 1 8 8 9 0 8 9     Results from last 7 days   Lab Units 08/25/21  1256   AST U/L 35   ALT U/L 58   ALK PHOS U/L 56   TOTAL PROTEIN g/dL 8 0   ALBUMIN g/dL 2 9*   TOTAL BILIRUBIN mg/dL 0 35         Results from last 7 days   Lab Units 08/28/21  0451 08/27/21  0441 08/26/21  1308 08/25/21  1256   GLUCOSE RANDOM mg/dL 92 103 95 90         Results from last 7 days   Lab Units 08/25/21  1421   TROPONIN I ng/mL <0 02         Results from last 7 days   Lab Units 08/25/21  1256   LIPASE u/L 66*         Results from last 7 days   Lab Units 08/25/21  1439   CLARITY UA  Clear   COLOR UA  Yellow   SPEC GRAV UA  1 022   PH UA  6 5   GLUCOSE UA mg/dl Negative   KETONES UA mg/dl Negative   BLOOD UA  Negative   PROTEIN UA mg/dl Negative   NITRITE UA  Negative   BILIRUBIN UA  Negative   UROBILINOGEN UA E U /dl 0 2   LEUKOCYTES UA  Negative       ED Treatment:   Medication Administration from 08/25/2021 1141 to 08/26/2021 1101       Date/Time Order Dose Route Action     08/25/2021 1551 sodium chloride 0 9 % bolus 1,000 mL 1,000 mL Intravenous New Bag     08/25/2021 1551 ceFAZolin (ANCEF) IVPB (premix in dextrose) 2,000 mg 50 mL 2,000 mg Intravenous New Bag     08/26/2021 0828 gabapentin (NEURONTIN) capsule 800 mg 800 mg Oral Given     08/26/2021 0833 LORazepam (ATIVAN) tablet 1 mg 1 mg Oral Given     08/25/2021 1955 LORazepam (ATIVAN) tablet 1 mg 1 mg Oral Given     08/25/2021 2214 OLANZapine (ZyPREXA) tablet 5 mg 5 mg Oral Given     08/26/2021 0828 amLODIPine (NORVASC) tablet 5 mg 5 mg Oral Given     08/25/2021 1959 amLODIPine (NORVASC) tablet 5 mg 5 mg Oral Given     08/26/2021 0523 pantoprazole (PROTONIX) EC tablet 40 mg 40 mg Oral Given     08/25/2021 1955 docusate sodium (COLACE) capsule 100 mg 100 mg Oral Given     08/26/2021 0833 metroNIDAZOLE (FLAGYL) IVPB (premix) 500 mg 100 mL 500 mg Intravenous New Bag     08/25/2021 1955 metroNIDAZOLE (FLAGYL) IVPB (premix) 500 mg 100 mL 500 mg Intravenous New Bag     08/26/2021 0523 ceftriaxone (ROCEPHIN) 1 g/50 mL in dextrose IVPB 1,000 mg Intravenous New Bag        Past Medical History:   Diagnosis Date    ADHD (attention deficit hyperactivity disorder)     Atypical pervasive developmental disorder     Autism     [facility] [address] [facility]     Constipation     Depression     Head-banging     Last Assessed:  9/1/17    Hydronephrosis 9/19/2019    Hyperlipidemia     Hypertension     Intermittent explosive disorder     Oppositional defiant disorder     Personality disorder ([facility] [address] [facility]     Schizophrenia ([facility] [address] [facility]     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[facility] [address] [facility]     Seizures ([facility] [address] [facility]     Sleep disorder     Vitamin D insufficiency     Last Assessed:  6/22/17     Present on Admission:   Bipolar 1 disorder ([facility] [address] [facility]   (Resolved) Constipation   Benign essential hypertension   GERD (gastroesophageal reflux disease)   (Resolved) Anxiety      Admitting Diagnosis:     Colitis [K52 9]  Epigastric pain [R10 13]  Weakness [R53 1]    Age/Sex: 29 y o  male    Scheduled Medications:    No current facility-administered medications for this encounter  Continuous IV Infusions:  No current facility-administered medications for this encounter  PRN Meds:  No current facility-administered medications for this encounter  IP CONSULT TO GASTROENTEROLOGY    Network Utilization Review Department  ATTENTION: Please call with any questions or concerns to [phone] and carefully listen to the prompts so that you are directed to the right person   All voicemails are confidential   Kristina Query all requests for admission clinical reviews, approved or denied determinations and any other requests to dedicated fax number below belonging to the campus where the patient is receiving treatment  List of dedicated fax numbers for the Facilities:  [address] [place] [address] DENIALS (Administrative/Medical Necessity) [phone]   [address]  (Maternity/NICU/Pediatrics) [phone] [address] Vinny Lacey [address] [address] [address]   Andrae  Chuymaria esther [address]as [address] [facility] [facility] [phone]         Initial Clinical Review    Admission: Date/Time/Statement:  Observation 8/25 @ 1628 and changed to Inpatient on 8/27 @ 0910  Pt requiring continued stay for treatment of Acute colitis/ + Heme occult and Iv antibiotics  Ordered        08/27/21 0910  Inpatient Admission  Once         08/25/21 1628  Place in Observation  Once                   Orders Placed This Encounter   Procedures    Inpatient Admission     Standing Status:   Standing     Number of Occurrences:   1     Order Specific Question:   Level of Care     Answer:   Med Surg [16]     Order Specific Question:   Estimated length of stay     Answer:   More than 2 Midnights     Order Specific Question:   Certification     Answer:   I certify that inpatient services are medically necessary for this patient for a duration of greater than two midnights  See H&P and MD Progress Notes for additional information about the patient's course of treatment       ED Arrival Information     Expected Arrival Acuity    - 8/25/2021 11:41 Urgent         Means of arrival Escorted by Service Admission type    [facility] Medicine Urgent Arrival complaint    Diarrhea/Weakness        Chief Complaint   Patient presents with    Abdominal Pain     Pt c/o epigastric pain since Sunday, diarrhea last night  Initial Presentation:   29y Male to ED presents with abdominal pain, weakness, decreased appetite and nausea  Per caregiver, pt has been "off" since Saturday and large volume episode of diarrhea, non bloody  PMH for GERD, HTN , Anxiety, Constipation and Bipolar 1 disorder  Admit Observation level of care for Acute Colitis  Given Iv antibiotics in ED and continue  Resume diet as tolerated  Continue home meds  8/25 CT AP: Diffuse mild-moderate colonic wall thickening with scattered air-fluid levels throughout suggesting colitis  Several mildly prominent nonspecific mesenteric lymph nodes, particularly in the right lower quadrant which are presumably reactive, possibly related to colitis    8/26 Progress notes; Continue Iv antibiotics  Failed to show stool to nurse before flushing  Per caregiver, pt went to bathroom 11 times, unsure if he had stools each time but caregiver saw bld on toilet seat x1     8/27 Progress notes; Pt with 2 episodes of liquid bld per rectum  Heme occult positive  Continue Iv antibiotics  C diff, stool culture and fecal leukocytes pending  GI consult  Pt c/o lower middle abdominal pain  8/27 GI cons; Diarrhea, Rectal bleeding  likely acute infectious enterocolitis  Lower suspicion for bacterial etiology  Possible component of IBS as well although acuity not consistent with this  Low suspicion for IBD  F/u stool studies  No plan for endoscopic evaluation at this time  Hold off on antidiarrheals for now  Can use Banatrol or Metamucil for stool-bulking if needed  IVFs  Continue PPI daily  Date: 8/28   Day 2:  Progress notes; No BM since 8/27 am  Continue Iv antibiotics  C diff  stool culture & fecal leukocytes pending BM  Heme occult positive, Hgb of 10 4 today 08/28       ED Triage Vitals   08/25/21 1213 08/25/21 1213 08/25/21 1213 08/25/21 1213 08/25/21 1213   98 3 °F (36 8 °C) 105 18 130/86 98 %      Oral Monitor         Pain Score       4          08/25/21 102 kg (225 lb 5 oz)     Additional Vital Signs:   08/28/21 08:23:49  97 8 °F (36 6 °C)  --  --  118/81  93  --  --  --   08/27/21 22:19:36  97 6 °F (36 4 °C)  --  --  119/80  93  --  --  --   08/27/21 2000  --  --  --  --  --  --  None (Room air)       08/27/21 07:51:05  98 4 °F (36 9 °C)  --  --  120/76  91  --  --     Date/Time  Temp  Pulse  Resp  BP  MAP   SpO2  O2 Device   08/26/21 13:13:51  98 9 °F (37 2°C)  83  16  127/89  102  99 %  None (Room air)   08/26/21 1313  --  --  --  --  --  97 %  None (Room air)   08/26/21 0800  --  84  18  142/112  Abnormal   124  99 %  --   08/26/21 0225  --  94  18  129/81  --  97 %  None (Room air)   08/25/21 2015  --  102  18  140/78  --  97 %  None (Room air)   08/25/21 1556  --  102  18  124/89  --  99 %  None (Room air)     Pertinent Labs/Diagnostic Test Results:     Collection Time Result Time Vent R Atrial R MD Int  QRSD Int  QT Int  QTC Int  P Axis QRS Axis T Wave Ax    08/25/21 12:51:22 08/25/21 13:33:58 101 101 166 142 374 484 26 95 5                     Sinus tachycardia   Right bundle branch block   Abnormal ECG   When compared with ECG of 11-MAY-2016 14:20,   Right bundle branch block is now Present                 CT abdomen pelvis with contrast   Final  (08/25 1530)         1  Diffuse mild-moderate colonic wall thickening with scattered air-fluid levels throughout suggesting colitis  2   Several mildly prominent nonspecific mesenteric lymph nodes, particularly in the right lower quadrant which are presumably reactive, possibly related to colitis  3   Additional findings as noted  XR chest 2 views   Final  (08/25 1548)   Poor inspiration with prominent markings   No acute cardiopulmonary disease          Results from last 7 days   Lab Units 08/25/21  1256   SARS-COV-2  Negative     Results from last 7 days   Lab Units 08/28/21  0451 08/27/21  1045 08/26/21  0736 08/25/21  1256   WBC Thousand/uL 10 08 10 33* 10 21* 12 16*   HEMOGLOBIN g/dL 10 4* 10 9* 10 6* 12 0   HEMATOCRIT % 36 7 37 6 36 1* 41 2   PLATELETS Thousands/uL 273 254 241 251   NEUTROS ABS Thousands/µL  --   --  5 69 7 50         Results from last 7 days   Lab Units 08/28/21  0451 08/27/21  0441 08/26/21  1308 08/25/21  1256   SODIUM mmol/L 141 142 138 139   POTASSIUM mmol/L 4 1 4 0 3 7 3 9   CHLORIDE mmol/L 113* 111* 108 108   CO2 mmol/L 28 27 26 25   ANION GAP mmol/L 0* 4 4 6   BUN mg/dL 8 9 8 13   CREATININE mg/dL 0 85 0 74 0 77 0 83   EGFR ml/min/1 73sq m 113 120 118 114   CALCIUM mg/dL 9 1 8 8 9 0 8 9     Results from last 7 days   Lab Units 08/25/21  1256   AST U/L 35   ALT U/L 58   ALK PHOS U/L 56   TOTAL PROTEIN g/dL 8 0   ALBUMIN g/dL 2 9*   TOTAL BILIRUBIN mg/dL 0 35         Results from last 7 days   Lab Units 08/28/21  0451 08/27/21  0441 08/26/21  1308 08/25/21  1256   GLUCOSE RANDOM mg/dL 92 103 95 90         Results from last 7 days   Lab Units 08/25/21  1421   TROPONIN I ng/mL <0 02       Results from last 7 days   Lab Units 08/25/21  1256   LIPASE u/L 66*       Results from last 7 days   Lab Units 08/25/21  1439   CLARITY UA  Clear   COLOR UA  Yellow   SPEC GRAV UA  1 022   PH UA  6 5   GLUCOSE UA mg/dl Negative   KETONES UA mg/dl Negative   BLOOD UA  Negative   PROTEIN UA mg/dl Negative   NITRITE UA  Negative   BILIRUBIN UA  Negative   UROBILINOGEN UA E U /dl 0 2   LEUKOCYTES UA  Negative       ED Treatment:   Medication Administration from 08/25/2021 1141 to 08/26/2021 1101       Date/Time Order Dose Route Action     08/25/2021 1551 sodium chloride 0 9 % bolus 1,000 mL 1,000 mL Intravenous New Bag     08/25/2021 1551 ceFAZolin (ANCEF) IVPB (premix in dextrose) 2,000 mg 50 mL 2,000 mg Intravenous New Bag     08/26/2021 0828 gabapentin (NEURONTIN) capsule 800 mg 800 mg Oral Given     08/26/2021 0833 LORazepam (ATIVAN) tablet 1 mg 1 mg Oral Given     08/25/2021 1955 LORazepam (ATIVAN) tablet 1 mg 1 mg Oral Given     08/25/2021 2214 OLANZapine (ZyPREXA) tablet 5 mg 5 mg Oral Given     08/26/2021 0828 amLODIPine (NORVASC) tablet 5 mg 5 mg Oral Given     08/25/2021 1959 amLODIPine (NORVASC) tablet 5 mg 5 mg Oral Given     08/26/2021 0523 pantoprazole (PROTONIX) EC tablet 40 mg 40 mg Oral Given     08/25/2021 1955 docusate sodium (COLACE) capsule 100 mg 100 mg Oral Given     08/26/2021 0833 metroNIDAZOLE (FLAGYL) IVPB (premix) 500 mg 100 mL 500 mg Intravenous New Bag     08/25/2021 1955 metroNIDAZOLE (FLAGYL) IVPB (premix) 500 mg 100 mL 500 mg Intravenous New Bag     08/26/2021 0523 ceftriaxone (ROCEPHIN) 1 g/50 mL in dextrose IVPB 1,000 mg Intravenous New Bag        Past Medical History:   Diagnosis Date    ADHD (attention deficit hyperactivity disorder)     Atypical pervasive developmental disorder     Autism     [facility] disorder ([facility]     Constipation     Depression     Head-banging     Last Assessed:  9/1/17    Hydronephrosis 9/19/2019    Hyperlipidemia     Hypertension     Intermittent explosive disorder     Oppositional defiant disorder     Personality disorder ([facility]     Schizophrenia ([facility]     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[facility]     Seizures ([facility] )     Sleep disorder     Vitamin D insufficiency     Last Assessed:  6/22/17     Present on Admission:   Bipolar 1 disorder ([facility] )   (Resolved) Constipation   Benign essential hypertension   GERD (gastroesophageal reflux disease)   (Resolved) Anxiety      Admitting Diagnosis:     Colitis [K52 9]  Epigastric pain [R10 13]  Weakness [R53 1]    Age/Sex: 29 y o  male    Scheduled Medications:  No current facility-administered medications for this encounter  Continuous IV Infusions:  No current facility-administered medications for this encounter  PRN Meds:  No current facility-administered medications for this encounter        IP CONSULT TO GASTROENTEROLOGY    Network Utilization Review Department  ATTENTION: Please call with any questions or concerns to [phone] and carefully listen to the prompts so that you are directed to the right person  All voicemails are confidential   Lakisha Swann all requests for admission clinical reviews, approved or denied determinations and any other requests to dedicated fax number below belonging to the campus where the patient is receiving treatment  List of dedicated fax numbers for the Facilities:  [address] DENIALS (Administrative/Medical Necessity) [phone]   [address] (Maternity/NICU/Pediatrics) [phone] [address] Casillas [facility] [id number] [address] Chauhan Ashwin 1481 [address] [place] [address] [phone]     Notification of Discharge   This is a Notification of Discharge from our facility [address]  Please be advised that this patient has been discharge from our facility  Below you will find the admission and discharge date and time including the patients disposition  UTILIZATION REVIEW CONTACT:  Syeda Ace  Utilization   Network Utilization Review Department  Phone: [phone] x carefully listen to the prompts  All voicemails are confidential   Email: [email or web address]     PHYSICIAN ADVISORY SERVICES:  FOR [id number] REVIEW - MEDICAL NECESSITY DENIAL  Phone: [phone]  Fax: [phone]  Email: [email or web address]     PRESENTATION DATE: 8/25/2021 12:11 PM  OBERVATION ADMISSION DATE:   INPATIENT ADMISSION DATE: 8/27/21  9:10 AM   DISCHARGE DATE: 8/30/2021  6:33 PM  DISPOSITION: Home/Self Care Home/Self Care      IMPORTANT INFORMATION:  Send all requests for admission clinical reviews, approved or denied determinations and any other requests to dedicated fax number below belonging to the campus where the patient is receiving treatment   List of dedicated fax numbers:  [address] DENIALS (Administrative/Medical Necessity) [phone]   [address] (Maternity/NICU/Pediatrics) [phone]   [facility] [phone]   [facility] [phone]   Vani Nelson [phone]   [facility] [address] [phone]   [facility]  [phone]   [facility]  2401 [facility] 1000 [facility] [phone]

## 2021-09-08 ENCOUNTER — OFFICE VISIT (OUTPATIENT)
Dept: FAMILY MEDICINE CLINIC | Facility: CLINIC | Age: 36
End: 2021-09-08

## 2021-09-08 VITALS
RESPIRATION RATE: 18 BRPM | DIASTOLIC BLOOD PRESSURE: 72 MMHG | TEMPERATURE: 98.6 F | WEIGHT: 222.6 LBS | HEIGHT: 69 IN | HEART RATE: 114 BPM | SYSTOLIC BLOOD PRESSURE: 134 MMHG | OXYGEN SATURATION: 97 % | BODY MASS INDEX: 32.97 KG/M2

## 2021-09-08 DIAGNOSIS — K52.9 COLITIS, ACUTE: Primary | ICD-10-CM

## 2021-09-08 DIAGNOSIS — R89.9 ABNORMAL LABORATORY TEST: ICD-10-CM

## 2021-09-08 DIAGNOSIS — K21.9 GASTROESOPHAGEAL REFLUX DISEASE: ICD-10-CM

## 2021-09-08 PROBLEM — S61.512A LACERATION OF LEFT WRIST: Status: RESOLVED | Noted: 2021-02-11 | Resolved: 2021-09-08

## 2021-09-08 PROBLEM — J06.9 VIRAL UPPER RESPIRATORY TRACT INFECTION: Status: RESOLVED | Noted: 2019-02-01 | Resolved: 2021-09-08

## 2021-09-08 PROBLEM — T14.8XXA ABRASION: Status: RESOLVED | Noted: 2020-08-12 | Resolved: 2021-09-08

## 2021-09-08 PROCEDURE — 99495 TRANSJ CARE MGMT MOD F2F 14D: CPT | Performed by: PHYSICIAN ASSISTANT

## 2021-09-08 RX ORDER — METRONIDAZOLE 500 MG/1
TABLET ORAL
COMMUNITY
Start: 2021-08-30 | End: 2021-09-08 | Stop reason: ALTCHOICE

## 2021-09-08 RX ORDER — CARBAMAZEPINE 200 MG/1
200 CAPSULE, EXTENDED RELEASE ORAL 2 TIMES DAILY
COMMUNITY
End: 2021-11-23

## 2021-09-08 RX ORDER — OMEPRAZOLE 20 MG/1
CAPSULE, DELAYED RELEASE ORAL
Qty: 30 CAPSULE | Refills: 0 | Status: SHIPPED | OUTPATIENT
Start: 2021-09-08 | End: 2021-11-11

## 2021-09-08 RX ORDER — DOCUSATE SODIUM 100 MG/1
CAPSULE ORAL
Qty: 60 CAPSULE | Refills: 0 | Status: SHIPPED | OUTPATIENT
Start: 2021-09-08 | End: 2021-09-08

## 2021-09-09 ENCOUNTER — TELEPHONE (OUTPATIENT)
Dept: FAMILY MEDICINE CLINIC | Facility: CLINIC | Age: 36
End: 2021-09-09

## 2021-09-09 NOTE — TELEPHONE ENCOUNTER
Maria Del Carmen Flores from [facility] Directed Support  called to schedule PPD placement 12/13/21     If there is an issue with this please let me know

## 2021-10-12 DIAGNOSIS — R09.81 MILD NASAL CONGESTION: ICD-10-CM

## 2021-10-12 DIAGNOSIS — R21 RASH: ICD-10-CM

## 2021-10-12 DIAGNOSIS — T14.8XXA ABRASION: ICD-10-CM

## 2021-10-12 DIAGNOSIS — J02.9 SORE THROAT: ICD-10-CM

## 2021-10-12 RX ORDER — IBUPROFEN 600 MG/1
600 TABLET ORAL EVERY 6 HOURS PRN
COMMUNITY
End: 2021-10-26

## 2021-10-12 RX ORDER — IBUPROFEN 600 MG/1
600 TABLET ORAL EVERY 6 HOURS PRN
Qty: 30 TABLET | Refills: 5 | Status: SHIPPED | OUTPATIENT
Start: 2021-10-12 | End: 2022-07-19

## 2021-10-12 RX ORDER — BACITRACIN, NEOMYCIN, POLYMYXIN B 400; 3.5; 5 [USP'U]/G; MG/G; [USP'U]/G
OINTMENT TOPICAL
Qty: 15 G | Refills: 5 | Status: SHIPPED | OUTPATIENT
Start: 2021-10-12

## 2021-10-12 RX ORDER — GUAIFENESIN/DEXTROMETHORPHAN 100-10MG/5
5 SYRUP ORAL 3 TIMES DAILY PRN
Qty: 118 ML | Refills: 5 | Status: SHIPPED | OUTPATIENT
Start: 2021-10-12

## 2021-10-12 RX ORDER — L-DESOXYEPHEDRINE 50 MG
INHALER (EA) NASAL 2 TIMES DAILY PRN
Qty: 50 G | Refills: 5 | Status: SHIPPED | OUTPATIENT
Start: 2021-10-12

## 2021-10-21 DIAGNOSIS — F98.4 HEAD-BANGING: Primary | ICD-10-CM

## 2021-10-26 ENCOUNTER — IMMUNIZATIONS (OUTPATIENT)
Dept: FAMILY MEDICINE CLINIC | Facility: HOSPITAL | Age: 36
End: 2021-10-26

## 2021-10-26 DIAGNOSIS — Z23 ENCOUNTER FOR IMMUNIZATION: Primary | ICD-10-CM

## 2021-10-26 PROCEDURE — 91300 COVID-19 PFIZER VACC 0.3 ML: CPT

## 2021-10-26 PROCEDURE — 0001A COVID-19 PFIZER VACC 0.3 ML: CPT

## 2021-10-26 RX ORDER — IBUPROFEN 600 MG/1
TABLET ORAL
Qty: 30 TABLET | Refills: 0 | Status: SHIPPED | OUTPATIENT
Start: 2021-10-26

## 2021-10-29 ENCOUNTER — OFFICE VISIT (OUTPATIENT)
Dept: GASTROENTEROLOGY | Facility: MEDICAL CENTER | Age: 36
End: 2021-10-29
Payer: COMMERCIAL

## 2021-10-29 VITALS — BODY MASS INDEX: 33.55 KG/M2 | WEIGHT: 227.2 LBS | TEMPERATURE: 98.2 F

## 2021-10-29 DIAGNOSIS — K21.9 GASTROESOPHAGEAL REFLUX DISEASE WITHOUT ESOPHAGITIS: ICD-10-CM

## 2021-10-29 DIAGNOSIS — A04.5 CAMPYLOBACTER DIARRHEA: Primary | ICD-10-CM

## 2021-10-29 DIAGNOSIS — K59.00 CONSTIPATION, UNSPECIFIED CONSTIPATION TYPE: ICD-10-CM

## 2021-10-29 PROBLEM — K59.09 OTHER CONSTIPATION: Status: ACTIVE | Noted: 2018-04-24

## 2021-10-29 PROCEDURE — 99214 OFFICE O/P EST MOD 30 MIN: CPT | Performed by: PHYSICIAN ASSISTANT

## 2021-10-29 RX ORDER — SULFACETAMIDE SODIUM 100 MG/ML
1 SOLUTION/ DROPS OPHTHALMIC
COMMUNITY
End: 2021-11-23

## 2021-10-29 RX ORDER — DOCUSATE SODIUM 100 MG/1
100 CAPSULE, LIQUID FILLED ORAL 2 TIMES DAILY
Qty: 60 CAPSULE | Refills: 5 | Status: SHIPPED | OUTPATIENT
Start: 2021-10-29 | End: 2021-10-29 | Stop reason: SDUPTHER

## 2021-10-29 RX ORDER — DOCUSATE SODIUM 100 MG/1
100 CAPSULE, LIQUID FILLED ORAL 2 TIMES DAILY PRN
Qty: 60 CAPSULE | Refills: 12 | Status: SHIPPED | OUTPATIENT
Start: 2021-10-29

## 2021-10-29 RX ORDER — DOCUSATE SODIUM 100 MG/1
100 CAPSULE, LIQUID FILLED ORAL 2 TIMES DAILY PRN
Qty: 60 CAPSULE | Refills: 12 | Status: SHIPPED | OUTPATIENT
Start: 2021-10-29 | End: 2021-10-29 | Stop reason: SDUPTHER

## 2021-11-08 ENCOUNTER — APPOINTMENT (OUTPATIENT)
Dept: LAB | Facility: CLINIC | Age: 36
End: 2021-11-08
Payer: COMMERCIAL

## 2021-11-08 DIAGNOSIS — R89.9 ABNORMAL LABORATORY TEST: ICD-10-CM

## 2021-11-08 LAB
BASOPHILS # BLD AUTO: 0.07 THOUSANDS/ΜL (ref 0–0.1)
BASOPHILS NFR BLD AUTO: 1 % (ref 0–1)
EOSINOPHIL # BLD AUTO: 0.22 THOUSAND/ΜL (ref 0–0.61)
EOSINOPHIL NFR BLD AUTO: 3 % (ref 0–6)
ERYTHROCYTE [DISTWIDTH] IN BLOOD BY AUTOMATED COUNT: 16.3 % (ref 11.6–15.1)
HCT VFR BLD AUTO: 43 % (ref 36.5–49.3)
HGB BLD-MCNC: 11.9 G/DL (ref 12–17)
IMM GRANULOCYTES # BLD AUTO: 0.03 THOUSAND/UL (ref 0–0.2)
IMM GRANULOCYTES NFR BLD AUTO: 0 % (ref 0–2)
LYMPHOCYTES # BLD AUTO: 3.19 THOUSANDS/ΜL (ref 0.6–4.47)
LYMPHOCYTES NFR BLD AUTO: 41 % (ref 14–44)
MCH RBC QN AUTO: 22.3 PG (ref 26.8–34.3)
MCHC RBC AUTO-ENTMCNC: 27.7 G/DL (ref 31.4–37.4)
MCV RBC AUTO: 81 FL (ref 82–98)
MONOCYTES # BLD AUTO: 0.85 THOUSAND/ΜL (ref 0.17–1.22)
MONOCYTES NFR BLD AUTO: 11 % (ref 4–12)
NEUTROPHILS # BLD AUTO: 3.43 THOUSANDS/ΜL (ref 1.85–7.62)
NEUTS SEG NFR BLD AUTO: 44 % (ref 43–75)
NRBC BLD AUTO-RTO: 0 /100 WBCS
PLATELET # BLD AUTO: 335 THOUSANDS/UL (ref 149–390)
PMV BLD AUTO: 12.2 FL (ref 8.9–12.7)
RBC # BLD AUTO: 5.34 MILLION/UL (ref 3.88–5.62)
WBC # BLD AUTO: 7.79 THOUSAND/UL (ref 4.31–10.16)

## 2021-11-08 PROCEDURE — 85025 COMPLETE CBC W/AUTO DIFF WBC: CPT

## 2021-11-08 PROCEDURE — 36415 COLL VENOUS BLD VENIPUNCTURE: CPT

## 2021-11-11 DIAGNOSIS — K21.9 GASTROESOPHAGEAL REFLUX DISEASE: ICD-10-CM

## 2021-11-11 DIAGNOSIS — I10 ESSENTIAL HYPERTENSION: ICD-10-CM

## 2021-11-11 RX ORDER — AMLODIPINE BESYLATE 5 MG/1
TABLET ORAL
Qty: 30 TABLET | Refills: 0 | Status: SHIPPED | OUTPATIENT
Start: 2021-11-11 | End: 2022-01-05

## 2021-11-11 RX ORDER — OMEPRAZOLE 20 MG/1
CAPSULE, DELAYED RELEASE ORAL
Qty: 30 CAPSULE | Refills: 0 | Status: SHIPPED | OUTPATIENT
Start: 2021-11-11 | End: 2022-01-05

## 2021-11-23 ENCOUNTER — OFFICE VISIT (OUTPATIENT)
Dept: FAMILY MEDICINE CLINIC | Facility: CLINIC | Age: 36
End: 2021-11-23

## 2021-11-23 VITALS
OXYGEN SATURATION: 96 % | WEIGHT: 219.6 LBS | SYSTOLIC BLOOD PRESSURE: 122 MMHG | BODY MASS INDEX: 32.43 KG/M2 | TEMPERATURE: 100 F | HEART RATE: 66 BPM | DIASTOLIC BLOOD PRESSURE: 50 MMHG

## 2021-11-23 DIAGNOSIS — I10 BENIGN ESSENTIAL HYPERTENSION: Primary | ICD-10-CM

## 2021-11-23 DIAGNOSIS — F20.9 SCHIZOPHRENIA, UNSPECIFIED TYPE (HCC): ICD-10-CM

## 2021-11-23 PROBLEM — Z00.00 HEALTHCARE MAINTENANCE: Status: RESOLVED | Noted: 2019-06-28 | Resolved: 2021-11-23

## 2021-11-23 PROBLEM — R73.09 ELEVATED GLUCOSE LEVEL: Status: RESOLVED | Noted: 2020-07-08 | Resolved: 2021-11-23

## 2021-11-23 PROBLEM — A04.5 CAMPYLOBACTER DIARRHEA: Status: RESOLVED | Noted: 2021-10-29 | Resolved: 2021-11-23

## 2021-11-23 PROBLEM — K52.9 COLITIS, ACUTE: Status: RESOLVED | Noted: 2021-08-25 | Resolved: 2021-11-23

## 2021-11-23 PROBLEM — R89.9 ABNORMAL LABORATORY TEST: Status: RESOLVED | Noted: 2021-09-08 | Resolved: 2021-11-23

## 2021-11-23 PROCEDURE — 99213 OFFICE O/P EST LOW 20 MIN: CPT | Performed by: PHYSICIAN ASSISTANT

## 2021-11-23 PROCEDURE — 3074F SYST BP LT 130 MM HG: CPT | Performed by: PHYSICIAN ASSISTANT

## 2021-11-23 PROCEDURE — 3078F DIAST BP <80 MM HG: CPT | Performed by: PHYSICIAN ASSISTANT

## 2021-12-13 ENCOUNTER — CLINICAL SUPPORT (OUTPATIENT)
Dept: FAMILY MEDICINE CLINIC | Facility: CLINIC | Age: 36
End: 2021-12-13

## 2021-12-13 DIAGNOSIS — Z11.1 ENCOUNTER FOR PPD SKIN TEST READING: Primary | ICD-10-CM

## 2021-12-15 ENCOUNTER — CLINICAL SUPPORT (OUTPATIENT)
Dept: FAMILY MEDICINE CLINIC | Facility: CLINIC | Age: 36
End: 2021-12-15

## 2021-12-15 DIAGNOSIS — Z11.1 PPD SCREENING TEST: Primary | ICD-10-CM

## 2021-12-15 LAB
INDURATION: 0 MM
TB SKIN TEST: NEGATIVE

## 2021-12-15 PROCEDURE — 86580 TB INTRADERMAL TEST: CPT

## 2022-01-05 DIAGNOSIS — K21.9 GASTROESOPHAGEAL REFLUX DISEASE: ICD-10-CM

## 2022-01-05 DIAGNOSIS — I10 ESSENTIAL HYPERTENSION: ICD-10-CM

## 2022-01-05 RX ORDER — OMEPRAZOLE 20 MG/1
CAPSULE, DELAYED RELEASE ORAL
Qty: 31 CAPSULE | Refills: 0 | Status: SHIPPED | OUTPATIENT
Start: 2022-01-05 | End: 2022-03-09

## 2022-01-05 RX ORDER — AMLODIPINE BESYLATE 5 MG/1
TABLET ORAL
Qty: 31 TABLET | Refills: 0 | Status: SHIPPED | OUTPATIENT
Start: 2022-01-05 | End: 2022-03-09

## 2022-01-24 DIAGNOSIS — J30.9 ALLERGIC CONJUNCTIVITIS OF BOTH EYES AND RHINITIS: ICD-10-CM

## 2022-01-24 DIAGNOSIS — H10.13 ALLERGIC CONJUNCTIVITIS OF BOTH EYES AND RHINITIS: ICD-10-CM

## 2022-01-24 RX ORDER — LORAZEPAM 1 MG/1
1 TABLET ORAL 2 TIMES DAILY
COMMUNITY
Start: 2021-12-17

## 2022-01-24 NOTE — TELEPHONE ENCOUNTER
Person Directed Supports Called for medication refill on his Flonase Nasal [place], they state if provider can put in the instructions PRN patient don't need it all the time   Thanks

## 2022-01-25 RX ORDER — FLUTICASONE PROPIONATE 50 MCG
1 SPRAY, SUSPENSION (ML) NASAL DAILY PRN
Qty: 9.9 ML | Refills: 3 | Status: SHIPPED | OUTPATIENT
Start: 2022-01-25

## 2022-03-08 DIAGNOSIS — I10 ESSENTIAL HYPERTENSION: ICD-10-CM

## 2022-03-08 DIAGNOSIS — K21.9 GASTROESOPHAGEAL REFLUX DISEASE: ICD-10-CM

## 2022-03-09 RX ORDER — OMEPRAZOLE 20 MG/1
CAPSULE, DELAYED RELEASE ORAL
Qty: 31 CAPSULE | Refills: 0 | Status: SHIPPED | OUTPATIENT
Start: 2022-03-09 | End: 2022-05-11

## 2022-03-09 RX ORDER — AMLODIPINE BESYLATE 5 MG/1
TABLET ORAL
Qty: 31 TABLET | Refills: 0 | Status: SHIPPED | OUTPATIENT
Start: 2022-03-09 | End: 2022-05-11

## 2022-03-23 ENCOUNTER — TELEMEDICINE (OUTPATIENT)
Dept: FAMILY MEDICINE CLINIC | Facility: CLINIC | Age: 37
End: 2022-03-23

## 2022-03-23 DIAGNOSIS — F20.9 SCHIZOPHRENIA, UNSPECIFIED TYPE (HCC): Primary | ICD-10-CM

## 2022-03-23 DIAGNOSIS — K21.9 GASTROESOPHAGEAL REFLUX DISEASE WITHOUT ESOPHAGITIS: ICD-10-CM

## 2022-03-23 PROBLEM — Z00.00 ANNUAL PHYSICAL EXAM: Status: RESOLVED | Noted: 2020-07-08 | Resolved: 2022-03-23

## 2022-03-23 PROCEDURE — 3074F SYST BP LT 130 MM HG: CPT | Performed by: PHYSICIAN ASSISTANT

## 2022-03-23 PROCEDURE — 3078F DIAST BP <80 MM HG: CPT | Performed by: PHYSICIAN ASSISTANT

## 2022-03-23 PROCEDURE — 99213 OFFICE O/P EST LOW 20 MIN: CPT | Performed by: PHYSICIAN ASSISTANT

## 2022-03-23 NOTE — PROGRESS NOTES
Virtual Regular Visit    Verification of patient location:    Patient is located in the following state in which I hold an active license [place]      Assessment/Plan:    Problem List Items Addressed This Visit        Digestive    GERD (gastroesophageal reflux disease)      Stable on omeprazole 20 mg daily-continue            Other    Schizophrenia ([facility] [address] ) - Primary     Condition is currently stable on current medications  Patient is followed by Psychiatry  Keep scheduled follow-up appointments  Reason for visit is   Chief Complaint   Patient presents with    Virtual Regular Visit        Encounter provider Mj Rojas PA-C    Provider located at [address] [place] [id number] [phone]      Recent Visits  No visits were found meeting these conditions  Showing recent visits within past 7 days and meeting all other requirements  Today's Visits  Date Type Provider Dept   03/23/22 Telemedicine Mj Rojas PA-C  Shira Venegas   Showing today's visits and meeting all other requirements  Future Appointments  No visits were found meeting these conditions  Showing future appointments within next 150 days and meeting all other requirements       The patient was identified by name and date of birth  Isaak Retana was informed that this is a telemedicine visit and that the visit is being conducted through [address] [place] [address] Now and patient was informed that this is a secure, HIPAA-compliant platform  He agrees to proceed     My office door was closed  No one else was in the room  He acknowledged consent and understanding of privacy and security of the video platform  The patient has agreed to participate and understands they can discontinue the visit at any time  Patient is aware this is a billable service       Subjective  Isaak Retana is a 40 y o  male presents today with caregiver Mk Mcqueen for follow- up  Mkosmar Pauly reports pt's condition is stable  Pt is taking all medications as directed  She reports pt tends to become a little more agitated with the change in seasons  No concerns or complaints voiced at this time             Past Medical History:   Diagnosis Date    ADHD (attention deficit hyperactivity disorder)     Atypical pervasive developmental disorder     Autism     Bipolar disorder ([facility]     Constipation     Depression     Head-banging     Last Assessed:  9/1/17    Hydronephrosis 9/19/2019    Hyperlipidemia     Hypertension     Intermittent explosive disorder     Oppositional defiant disorder     Personality disorder ([facility] )     Schizophrenia ([facility] )     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[facility] )     Seizures ([facility] )     Sleep disorder     Vitamin D insufficiency     Last Assessed:  6/22/17       Past Surgical History:   Procedure Laterality Date    NO PAST SURGERIES      UMBILICAL HERNIA REPAIR         Current Outpatient Medications   Medication Sig Dispense Refill    acetaminophen (TYLENOL) 325 mg tablet Take 1 tablet (325 mg total) by mouth every 4 (four) hours as needed for mild pain or fever 30 tablet 5    amLODIPine (NORVASC) 5 mg tablet TAKE 1 TABLET BY MOUTH ONCE DAILY AT 8AM (HTN)*BARRIENTOS 31 tablet 0    ammonium lactate (LAC-HYDRIN) 12 % lotion Apply topically to affected area on skin twice daily as needed for eczema 225 g 5    Benzoyl Peroxide (benzoyl peroxide) 10 % LIQD Used to wash/face/ shoulders/ back daily as needed (acne) 237 g 4    bismuth subsalicylate (PEPTO BISMOL) 524 mg/30 mL oral suspension Take 15 mL (262 mg total) by mouth every 6 (six) hours as needed for indigestion or diarrhea 360 mL 5    Camphor-Eucalyptus-Menthol (Vicks VapoRub) 4 7-1 2-2 6 % OINT Apply topically 2 (two) times a day as needed (PRN) 50 g 5    cetirizine (ZyrTEC) 5 MG chewable tablet Chew 1 tablet (5 mg total) daily at bedtime as needed for allergies 30 tablet 5    Cholecalciferol 125 MCG (5000 UT) capsule Take 5,000 Units by mouth daily (Patient not taking: Reported on 10/29/2021)      dextromethorphan-guaiFENesin (ROBITUSSIN DM)  mg/5 mL syrup Take 5 mL by mouth 3 (three) times a day as needed for cough or congestion 118 mL 5    divalproex sodium (DEPAKOTE ER) 500 mg 24 hr tablet Take 3 tablets (1500 mg total) by mouth once daily at 5 pm 30 tablet 6    docusate sodium (COLACE) 100 mg capsule Take 1 capsule (100 mg total) by mouth 2 (two) times a day as needed for constipation 60 capsule 12    fluticasone (FLONASE) 50 mcg/act nasal spray 1 spray into each nostril daily as needed for rhinitis or allergies 9 9 mL 3    gabapentin (NEURONTIN) 800 mg tablet Take 1 tablet (800 mg total) by mouth 3 times daily at 8 am, 4 pm, and 8 pm  0    guaiFENesin (ROBITUSSIN) 100 MG/5ML oral liquid Take 200 mg by mouth 3 (three) times a day as needed for cough      ibuprofen (MOTRIN) 600 mg tablet Take 1 tablet (600 mg total) by mouth every 6 (six) hours as needed for mild pain, moderate pain, fever or headaches (Patient not taking: Reported on 10/29/2021) 30 tablet 5    ibuprofen (MOTRIN) 600 mg tablet 1 TAB BY MOUTH EVERY 6 HRS AS NEEDED FOR MILD/MOD   PAIN/HEADACHE/ FEVER*BARRIENTOS 30 tablet 0    L-methylfolate Calcium 15 MG TABS  (Patient not taking: Reported on 10/29/2021)      L-Methylfolate-Algae (DEPLIN 15) 15-90 314 MG CAPS Take 15 mg by mouth daily Take 1 tablet by mouth once daily at 8AM      lanolin-mineral oil (BABY OIL) OIL Apply topically daily as needed for dry skin 591 mL 5    lithium carbonate (LITHOBID) 450 mg CR tablet Take 2 tablets (900 mg total) by mouth daily at 5:30 pm  0    LORazepam (ATIVAN) 0 5 mg tablet Take 1 mg by mouth 2 (two) times a day       LORazepam (ATIVAN) 1 mg tablet       neomycin-bacitracin-polymyxin b (NEOSPORIN) ointment Apply BID PRN for wound care 15 g 5    OLANZapine (ZyPREXA) 20 MG tablet Take 0 5 tablet (10 mg) by mouth twice daily at 12 pm and 1 5 tab(15mg)  8 pm  0    omeprazole (PriLOSEC) 20 mg delayed release capsule TAKE 1 CAPSULE BY MOUTH ONCE DAILY AT 7AM (GERD) *TERRAZAS 31 capsule 0    phenol (CHLORASEPTIC) 1 4 % mucosal liquid Apply 1 spray to the mouth or throat every 2 (two) hours as needed (sore throat) 118 mL 5    pyrithione zinc (HEAD AND SHOULDERS) 1 % shampoo Apply topically daily as needed for dandruff 240 mL 5    Sodium Fluoride (PreviDent 5000 Booster Plus) 1 1 % PSTE Use a pea-sized amount to brush at bedtime at 8:00 p m  100 mL 5    SODIUM FLUORIDE, DENTAL RINSE, (Listerine Smart Rinse) 0 02 % SOLN RINSE MOUTH 2X DAILY AS NEEDED FOR DENTAL CARE 476 mL 4    Sulfacetamide Sodium, Acne, 10 % LOTN Apply topically 2 (two) times a day 118 mL 5    Sunscreens (Sunblock SPF30) LOTN Apply every 2 hours up to five times daily 1 Bottle 5     No current facility-administered medications for this visit  Allergies   Allergen Reactions    Molds & Smuts Other (See Comments)     Patient does not know  Review of Systems   Constitutional: Negative for fever  Respiratory: Negative for shortness of breath  Cardiovascular: Negative for chest pain  Gastrointestinal: Negative for abdominal pain and constipation  Genitourinary: Negative for difficulty urinating  All other systems reviewed and are negative  Video Exam    There were no vitals filed for this visit  Physical Exam  Constitutional:       General: He is not in acute distress  Appearance: Normal appearance  He is not ill-appearing  Pulmonary:      Effort: Pulmonary effort is normal  No respiratory distress  Neurological:      Mental Status: He is alert  Mental status is at baseline  Psychiatric:         Mood and Affect: Mood normal           I spent 10 minutes directly with the patient during this visit    VIRTUAL VISIT [address] [facility] N verbally agrees to participate in [employer]   Pt is aware that Virtual Care Services could be limited without vital signs or the ability to perform a full hands-on physical Carissa Guillermo understands he or the provider may request at any time to terminate the video visit and request the patient to seek care or treatment in person

## 2022-03-23 NOTE — ASSESSMENT & PLAN NOTE
Condition is currently stable on current medications  Patient is followed by Psychiatry  Keep scheduled follow-up appointments

## 2022-03-30 DIAGNOSIS — F98.4 HEAD-BANGING: ICD-10-CM

## 2022-03-30 DIAGNOSIS — H10.13 ALLERGIC CONJUNCTIVITIS OF BOTH EYES AND RHINITIS: ICD-10-CM

## 2022-03-30 DIAGNOSIS — J30.9 ALLERGIC CONJUNCTIVITIS OF BOTH EYES AND RHINITIS: ICD-10-CM

## 2022-03-30 RX ORDER — ACETAMINOPHEN 325 MG/1
325 TABLET ORAL EVERY 4 HOURS PRN
Qty: 30 TABLET | Refills: 5 | Status: SHIPPED | OUTPATIENT
Start: 2022-03-30

## 2022-03-30 RX ORDER — CETIRIZINE HYDROCHLORIDE 5 MG/1
5 TABLET, CHEWABLE ORAL
Qty: 30 TABLET | Refills: 5 | Status: SHIPPED | OUTPATIENT
Start: 2022-03-30

## 2022-03-30 NOTE — TELEPHONE ENCOUNTER
Received voicemail from Nellie for medication refill of PRN Cetirizine and Acetaminophen 325mg  She is requesting this be sent to [facility]  Thanks!
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
## 2022-05-10 DIAGNOSIS — K21.9 GASTROESOPHAGEAL REFLUX DISEASE: ICD-10-CM

## 2022-05-10 DIAGNOSIS — I10 ESSENTIAL HYPERTENSION: ICD-10-CM

## 2022-05-11 RX ORDER — OMEPRAZOLE 20 MG/1
CAPSULE, DELAYED RELEASE ORAL
Qty: 31 CAPSULE | Refills: 0 | Status: SHIPPED | OUTPATIENT
Start: 2022-05-11 | End: 2022-07-06

## 2022-05-11 RX ORDER — AMLODIPINE BESYLATE 5 MG/1
TABLET ORAL
Qty: 31 TABLET | Refills: 0 | Status: SHIPPED | OUTPATIENT
Start: 2022-05-11 | End: 2022-07-06

## 2022-06-24 DIAGNOSIS — L21.0 DANDRUFF: ICD-10-CM

## 2022-06-24 RX ORDER — CETIRIZINE HYDROCHLORIDE 10 MG/1
TABLET ORAL
COMMUNITY
Start: 2022-03-31 | End: 2022-07-19

## 2022-07-06 ENCOUNTER — OFFICE VISIT (OUTPATIENT)
Dept: DENTISTRY | Facility: CLINIC | Age: 37
End: 2022-07-06

## 2022-07-06 VITALS — TEMPERATURE: 100 F

## 2022-07-06 DIAGNOSIS — K03.6 ACCRETIONS ON TEETH: ICD-10-CM

## 2022-07-06 DIAGNOSIS — K21.9 GASTROESOPHAGEAL REFLUX DISEASE: ICD-10-CM

## 2022-07-06 DIAGNOSIS — K02.9 DENTAL CARIES: ICD-10-CM

## 2022-07-06 DIAGNOSIS — I10 ESSENTIAL HYPERTENSION: ICD-10-CM

## 2022-07-06 DIAGNOSIS — Z01.20 ENCOUNTER FOR DENTAL EXAMINATION: Primary | ICD-10-CM

## 2022-07-06 PROCEDURE — D0120 PERIODIC ORAL EVALUATION - ESTABLISHED PATIENT: HCPCS | Performed by: DENTIST

## 2022-07-06 PROCEDURE — D0330 PANORAMIC RADIOGRAPHIC IMAGE: HCPCS

## 2022-07-06 PROCEDURE — D1206 TOPICAL APPLICATION OF FLUORIDE VARNISH: HCPCS

## 2022-07-06 PROCEDURE — D1110 PROPHYLAXIS - ADULT: HCPCS

## 2022-07-06 RX ORDER — AMLODIPINE BESYLATE 5 MG/1
TABLET ORAL
Qty: 31 TABLET | Refills: 0 | Status: SHIPPED | OUTPATIENT
Start: 2022-07-06 | End: 2022-09-02

## 2022-07-06 RX ORDER — OMEPRAZOLE 20 MG/1
CAPSULE, DELAYED RELEASE ORAL
Qty: 31 CAPSULE | Refills: 0 | Status: SHIPPED | OUTPATIENT
Start: 2022-07-06 | End: 2022-09-02

## 2022-07-06 NOTE — PROGRESS NOTES
RECALL EXAM, ADULT toyin KIMBROUGH  Patient arrived with caregiver   patient was on 4 mos recall but last visit was 8/21     REVIEWED MED HX: meds, allergies, health changes reviewed in EPIC  CHIEF CONCERN:  Slight sens  # 30 area he lost crown years ago  Cannot be restored      Dr Griffin Li   suggested we cover it with Fl2 varnish  PAIN SCALE:  0  ASA CLASS:  2  PLAQUE:  mild   CALCULUS:  no   BLEEDING:   none  STAIN :   none  ORAL HYGIENE:   fair  PERIO:  Slight plaque induced gingivitis margin of teeth UR     Hand scaled, polished and flossed  Oral Hygiene Instruction:  recommended brushing 2 x daily for 2 minutes MIN, recommended flossing daily, reviewed dietary precautions  Visual and Tactile Intraoral/ Extraoral evaluation: Oral and Oropharyngeal cancer evaluation  No findings     Dr Austin Robertson S / Dr Zamudio Carls with patient clinical and radiographic findings and patient verbalized understanding  All questions and concerns addressed  REFERRALS: no referrals needed  CARIES FINDINGS:  no caries noted  Monitor # 30 29      Stressed cont   Use of PREVIDENT  At night    Signed paper for facility  Next Recall: 4 month recall     Last [employer]/ FMX :  7/6/2022

## 2022-07-19 ENCOUNTER — OFFICE VISIT (OUTPATIENT)
Dept: FAMILY MEDICINE CLINIC | Facility: CLINIC | Age: 37
End: 2022-07-19

## 2022-07-19 ENCOUNTER — TELEPHONE (OUTPATIENT)
Dept: INTERNAL MEDICINE CLINIC | Facility: CLINIC | Age: 37
End: 2022-07-19

## 2022-07-19 VITALS
HEIGHT: 70 IN | OXYGEN SATURATION: 98 % | SYSTOLIC BLOOD PRESSURE: 123 MMHG | DIASTOLIC BLOOD PRESSURE: 87 MMHG | TEMPERATURE: 100.6 F | HEART RATE: 87 BPM | BODY MASS INDEX: 30.86 KG/M2 | WEIGHT: 215.6 LBS

## 2022-07-19 DIAGNOSIS — Z11.4 ENCOUNTER FOR SCREENING FOR HUMAN IMMUNODEFICIENCY VIRUS (HIV): ICD-10-CM

## 2022-07-19 DIAGNOSIS — K08.9 POOR DENTITION: ICD-10-CM

## 2022-07-19 DIAGNOSIS — H61.21 IMPACTED CERUMEN OF RIGHT EAR: ICD-10-CM

## 2022-07-19 DIAGNOSIS — Z11.59 ENCOUNTER FOR HEPATITIS C SCREENING TEST FOR LOW RISK PATIENT: ICD-10-CM

## 2022-07-19 DIAGNOSIS — Z00.00 ANNUAL PHYSICAL EXAM: Primary | ICD-10-CM

## 2022-07-19 PROCEDURE — 99395 PREV VISIT EST AGE 18-39: CPT | Performed by: PHYSICIAN ASSISTANT

## 2022-07-19 NOTE — ASSESSMENT & PLAN NOTE
Right ear cerumen impaction noted on physical exam   Ordered Debrox otic solution 5 drops in right ear BID x 4 days

## 2022-07-19 NOTE — TELEPHONE ENCOUNTER
Folder Color- RED    Name of Form- Medical Examination [place]    Form to be filled out by- Nevaeh    Form to be given to Care takers at the time of appointment    Patient made aware of 10 business day policy

## 2022-07-19 NOTE — ASSESSMENT & PLAN NOTE
Immunizations are up-to-date  Screening HIV and hepatitis-C ordered  Physical forms completed, copies made, and originals returned to caregiver

## 2022-07-19 NOTE — TELEPHONE ENCOUNTER
Form completed by Morena Nixon form given back to patient's care takers  Photocopy made and placed in RED clerical fodler

## 2022-07-19 NOTE — PATIENT INSTRUCTIONS

## 2022-07-19 NOTE — PROGRESS NOTES
[address] [facility]    NAME: Ranjit Ross  AGE: 40 y o  SEX: male  : 1985     DATE: 2022     Assessment and Plan:     Problem List Items Addressed This Visit        Digestive    Poor dentition    Relevant Medications    SODIUM FLUORIDE, DENTAL RINSE, (Listerine Smart Rinse) 0 02 % SOLN       Nervous and Auditory    Impacted cerumen of right ear     Right ear cerumen impaction noted on physical exam   Ordered Debrox otic solution 5 drops in right ear BID x 4 days         Relevant Medications    carbamide peroxide (DEBROX) 6 5 % otic solution       Other    Annual physical exam - Primary     Immunizations are up-to-date  Screening HIV and hepatitis-C ordered  Physical forms completed, copies made, and originals returned to caregiver           Other Visit Diagnoses     Encounter for hepatitis C screening test for low risk patient        Relevant Orders    Hepatitis C antibody    Encounter for screening for human immunodeficiency virus (HIV)        Relevant Orders    HIV 1/2 Antigen/Antibody (4th Generation) w Reflex [facility]            Immunizations and preventive care screenings were discussed with patient today  Appropriate education was printed on patient's after visit summary  Counseling:  Exercise: the importance of regular exercise/physical activity was discussed  Recommend exercise 3-5 times per week for at least 30 minutes  BMI Counseling: Body mass index is 31 38 kg/m²  The BMI is above normal  Nutrition recommendations include decreasing portion sizes, consuming healthier snacks, moderation in carbohydrate intake and reducing intake of cholesterol  Exercise recommendations include exercising 3-5 times per week  Rationale for BMI follow-up plan is due to patient being overweight or obese  No follow-ups on file       Chief Complaint:     Chief Complaint   Patient presents with   Nathan Cameron Annual Exam History of Present Illness:     Adult Annual Physical   Patient here with caregivers Teresa and Shailesh Bhatti for a comprehensive physical exam  The patient reports no problems  Diet and Physical Activity  Diet/Nutrition: well balanced diet  Exercise: no formal exercise  Depression Screening  PHQ-2/9 Depression Screening         General Health  Sleep: sleeps well and gets more than 8 hours of sleep on average  Hearing: normal - bilateral   Vision: goes for regular eye exams and suppose to wear glasses but breaks then once he gets them-per caregiver  Dental: regular dental visits, brushes teeth twice daily and does not floss   Health  History of STDs?: no      Review of Systems:     Review of Systems   Respiratory: Negative  Cardiovascular: Negative  Psychiatric/Behavioral: Positive for behavioral problems  All other systems reviewed and are negative       Past Medical History:     Past Medical History:   Diagnosis Date    ADHD (attention deficit hyperactivity disorder)     Atypical pervasive developmental disorder     Autism     Bipolar disorder [facility]     Constipation     Depression     Head-banging     Last Assessed:  9/1/17    Hydronephrosis 9/19/2019    Hyperlipidemia     Hypertension     Intermittent explosive disorder     Oppositional defiant disorder     Personality disorder ([facility]     Schizophrenia ([facility]     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[facility]     Seizures ([facility]     Sleep disorder     Vitamin D insufficiency     Last Assessed:  6/22/17      Past Surgical History:     Past Surgical History:   Procedure Laterality Date    NO PAST SURGERIES      UMBILICAL HERNIA REPAIR        Social History:     Social History     Socioeconomic History    Marital status: Single     Spouse name: None    Number of children: None    Years of education: None    Highest education level: None   Occupational History    None   Tobacco Use    Smoking status: Never Smoker    Smokeless tobacco: Never Used   Vaping Use    Vaping Use: Never used   Substance and Sexual Activity    Alcohol use: No    Drug use: No    Sexual activity: Never   Other Topics Concern    None   Social History Narrative    History of mold exposure     Social Determinants of Health     Financial Resource Strain: Low Risk     Difficulty of Paying Living Expenses: Not hard at all   Food Insecurity: No Food Insecurity    Worried About Running Out of Food in the Last Year: Never true    Anita of Food in the Last Year: Never true   Transportation Needs: Not on file   Physical Activity: Not on file   Stress: Not on file   Social Connections: Not on file   Intimate Partner Violence: Not on file   Housing Stability: [address] Unable to Pay for Housing in the Last Year: No    Number of [place] in the Last Year: 1    Unstable Housing in the Last Year: No      Family History:     History reviewed  No pertinent family history     Current Medications:     Current Outpatient Medications   Medication Sig Dispense Refill    acetaminophen (TYLENOL) 325 mg tablet Take 1 tablet (325 mg total) by mouth every 4 (four) hours as needed for mild pain or fever 30 tablet 5    amLODIPine (NORVASC) 5 mg tablet TAKE 1 TABLET BY MOUTH ONCE DAILY AT 8AM (HTN)*BARRIENTOS 31 tablet 0    ammonium lactate (LAC-HYDRIN) 12 % lotion Apply topically to affected area on skin twice daily as needed for eczema 225 g 5    Benzoyl Peroxide (benzoyl peroxide) 10 % LIQD Used to wash/face/ shoulders/ back daily as needed (acne) 237 g 4    bismuth subsalicylate (PEPTO BISMOL) 524 mg/30 mL oral suspension Take 15 mL (262 mg total) by mouth every 6 (six) hours as needed for indigestion or diarrhea 360 mL 5    Camphor-Eucalyptus-Menthol (Vicks VapoRub) 4 7-1 2-2 6 % OINT Apply topically 2 (two) times a day as needed (PRN) 50 g 5    carbamide peroxide (DEBROX) 6 5 % otic solution Administer 5 drops into both ears 2 (two) times a day for 4 days 15 mL 0    cetirizine (ZyrTEC) 5 MG chewable tablet Chew 1 tablet (5 mg total) daily at bedtime as needed for allergies 30 tablet 5    dextromethorphan-guaiFENesin (ROBITUSSIN DM)  mg/5 mL syrup Take 5 mL by mouth 3 (three) times a day as needed for cough or congestion 118 mL 5    divalproex sodium (DEPAKOTE ER) 500 mg 24 hr tablet Take 3 tablets (1500 mg total) by mouth once daily at 5 pm 30 tablet 6    docusate sodium (COLACE) 100 mg capsule Take 1 capsule (100 mg total) by mouth 2 (two) times a day as needed for constipation 60 capsule 12    fluticasone (FLONASE) 50 mcg/act nasal spray 1 spray into each nostril daily as needed for rhinitis or allergies 9 9 mL 3    gabapentin (NEURONTIN) 800 mg tablet Take 1 tablet (800 mg total) by mouth 3 times daily at 8 am, 4 pm, and 8 pm  0    guaiFENesin (ROBITUSSIN) 100 MG/5ML oral liquid Take 200 mg by mouth 3 (three) times a day as needed for cough      ibuprofen (MOTRIN) 600 mg tablet 1 TAB BY MOUTH EVERY 6 HRS AS NEEDED FOR MILD/MOD   PAIN/HEADACHE/ FEVER*BARRIENTOS 30 tablet 0    L-Methylfolate-Algae (DEPLIN 15) 15-90 314 MG CAPS Take 15 mg by mouth daily Take 1 tablet by mouth once daily at 8AM      lithium carbonate (LITHOBID) 450 mg CR tablet Take 2 tablets (900 mg total) by mouth daily at 5:30 pm  0    LORazepam (ATIVAN) 1 mg tablet Take 1 mg by mouth 2 (two) times a day       OLANZapine (ZyPREXA) 20 MG tablet Take 0 5 tablet (10 mg) by mouth twice daily at 12 pm and 1 5 tab(15mg)  8 pm (Patient taking differently: Take 0 5 tablet (10 mg) by mouth twice daily at 12 pm and (10mg)  8 pm)  0    omeprazole (PriLOSEC) 20 mg delayed release capsule TAKE 1 CAPSULE BY MOUTH ONCE DAILY AT 7AM (GERD) *TERRAZAS 31 capsule 0    phenol (CHLORASEPTIC) 1 4 % mucosal liquid Apply 1 spray to the mouth or throat every 2 (two) hours as needed (sore throat) 118 mL 5    pyrithione zinc (HEAD AND SHOULDERS) 1 % shampoo Apply topically daily as needed for dandruff 240 mL 5    SODIUM FLUORIDE, DENTAL RINSE, (Listerine Smart Rinse) 0 02 % SOLN Rinse mouth 2x daily prn dental care 476 mL 4    Sulfacetamide Sodium, Acne, 10 % LOTN Apply topically 2 (two) times a day 118 mL 5    Sunscreens (Sunblock SPF30) LOTN Apply every 2 hours up to five times daily 89 mL 5    neomycin-bacitracin-polymyxin b (NEOSPORIN) ointment Apply BID PRN for wound care 15 g 5     No current facility-administered medications for this visit  Allergies: Allergies   Allergen Reactions    Molds & Smuts Other (See Comments)     Patient does not know  Physical Exam:     /87 (BP Location: Left arm, Patient Position: Sitting, Cuff Size: Standard)   Pulse 87   Temp (!) 100 6 °F (38 1 °C) (Temporal)   Ht 5' 9 5" (1 765 m)   Wt 97 8 kg (215 lb 9 6 oz)   SpO2 98%   BMI 31 38 kg/m²     Physical Exam  Constitutional:       General: He is not in acute distress  Appearance: Normal appearance  He is not ill-appearing  HENT:      Head: Normocephalic and atraumatic  Right Ear: External ear normal  There is impacted cerumen  Left Ear: Tympanic membrane, ear canal and external ear normal  There is no impacted cerumen  Nose: Nose normal       Mouth/Throat:      Mouth: Mucous membranes are moist       Pharynx: No oropharyngeal exudate or posterior oropharyngeal erythema  Comments: Several missing teeth  Poor dentition  Eyes:      General:         Right eye: No discharge  Left eye: No discharge  Conjunctiva/sclera: Conjunctivae normal       Pupils: Pupils are equal, round, and reactive to light  Cardiovascular:      Rate and Rhythm: Normal rate and regular rhythm  Pulses: Normal pulses  Heart sounds: Normal heart sounds  No murmur heard  Pulmonary:      Effort: Pulmonary effort is normal       Breath sounds: Normal breath sounds  No wheezing or rhonchi  Abdominal:      General: Bowel sounds are normal  There is no distension  Palpations: Abdomen is soft   There is no mass  Tenderness: There is no abdominal tenderness  There is no guarding  Musculoskeletal:         General: No swelling or deformity  Cervical back: Normal range of motion and neck supple  No muscular tenderness  Lymphadenopathy:      Cervical: No cervical adenopathy  Skin:     General: Skin is warm and dry  Findings: No erythema  Neurological:      Mental Status: He is alert  Mental status is at baseline  Psychiatric:         Mood and Affect: Mood normal          Behavior: Behavior is cooperative            [address], PAChivoC   [address]

## 2022-07-21 ENCOUNTER — TELEPHONE (OUTPATIENT)
Dept: FAMILY MEDICINE CLINIC | Facility: CLINIC | Age: 37
End: 2022-07-21

## 2022-07-21 NOTE — TELEPHONE ENCOUNTER
Folder (To be completed by) -  Kane Ta     Name of Form -  Annual Physical Clarifications    Form to be Faxed (Fax #), [phone]

## 2022-08-11 ENCOUNTER — TELEPHONE (OUTPATIENT)
Dept: FAMILY MEDICINE CLINIC | Facility: CLINIC | Age: 37
End: 2022-08-11

## 2022-08-11 NOTE — TELEPHONE ENCOUNTER
Folder (To be completed by) - Red team/Brad     Name of Form - Person directed/ Needs Further Clarification from his Annual physical on 7/19/2022    Form to be Faxed back to [phone]    Patient was made aware of the 7 business day form policy

## 2022-08-17 DIAGNOSIS — L30.9 DERMATITIS: ICD-10-CM

## 2022-08-17 DIAGNOSIS — K08.9 POOR DENTITION: Primary | ICD-10-CM

## 2022-08-17 DIAGNOSIS — L70.0 ACNE VULGARIS: ICD-10-CM

## 2022-08-17 NOTE — TELEPHONE ENCOUNTER
Form reviewed and signed by NUNU Salinas PA-C  Form placed in the Red Folder in the [address] [place] [address] for  scan and fax to [phone]   Thanks

## 2022-08-23 RX ORDER — AMMONIUM LACTATE 12 G/100G
LOTION TOPICAL
Qty: 225 G | Refills: 0 | Status: SHIPPED | OUTPATIENT
Start: 2022-08-23

## 2022-08-23 RX ORDER — SODIUM FLUORIDE 6 MG/ML
PASTE, DENTIFRICE DENTAL
Qty: 100 ML | Refills: 0 | Status: SHIPPED | OUTPATIENT
Start: 2022-08-23

## 2022-08-23 RX ORDER — BENZOYL PEROXIDE 100 MG/ML
LIQUID TOPICAL
Qty: 237 G | Refills: 0 | Status: SHIPPED | OUTPATIENT
Start: 2022-08-23

## 2022-09-07 DIAGNOSIS — L70.0 ACNE VULGARIS: ICD-10-CM

## 2022-09-09 RX ORDER — SULFACETAMIDE SODIUM 100 MG/ML
LOTION TOPICAL
Qty: 118 ML | Refills: 0 | Status: SHIPPED | OUTPATIENT
Start: 2022-09-09

## 2022-09-19 DIAGNOSIS — J30.9 ALLERGIC CONJUNCTIVITIS OF BOTH EYES AND RHINITIS: ICD-10-CM

## 2022-09-19 DIAGNOSIS — H10.13 ALLERGIC CONJUNCTIVITIS OF BOTH EYES AND RHINITIS: ICD-10-CM

## 2022-09-19 RX ORDER — FLUTICASONE PROPIONATE 50 MCG
SPRAY, SUSPENSION (ML) NASAL
Qty: 16 G | Refills: 0 | Status: SHIPPED | OUTPATIENT
Start: 2022-09-19

## 2022-09-23 DIAGNOSIS — K59.00 CONSTIPATION, UNSPECIFIED CONSTIPATION TYPE: ICD-10-CM

## 2022-09-23 NOTE — TELEPHONE ENCOUNTER
Patient previously followed by [facility], f/u scheduled in November with Dr Jaleesa Greene  Are you able to approve Rx?

## 2022-09-29 RX ORDER — DOCUSATE SODIUM 100 MG/1
100 CAPSULE, LIQUID FILLED ORAL 2 TIMES DAILY PRN
Qty: 60 CAPSULE | Refills: 12 | Status: SHIPPED | OUTPATIENT
Start: 2022-09-29

## 2022-10-11 PROBLEM — H61.21 IMPACTED CERUMEN OF RIGHT EAR: Status: RESOLVED | Noted: 2022-07-19 | Resolved: 2022-10-11

## 2022-11-08 ENCOUNTER — TELEPHONE (OUTPATIENT)
Dept: FAMILY MEDICINE CLINIC | Facility: CLINIC | Age: 37
End: 2022-11-08

## 2022-11-08 DIAGNOSIS — T14.8XXA ABRASION: Primary | ICD-10-CM

## 2022-11-08 RX ORDER — IBUPROFEN 200 MG
TABLET ORAL 3 TIMES DAILY PRN
Status: SHIPPED | OUTPATIENT
Start: 2022-11-08

## 2022-11-08 NOTE — TELEPHONE ENCOUNTER
Received refill from [facility] for refill for  Triple Antibiotic Ointment 28 GM    Not on list   [address] [place] [address] fax  [phone]

## 2022-11-18 DIAGNOSIS — K21.9 GASTROESOPHAGEAL REFLUX DISEASE: ICD-10-CM

## 2022-11-18 DIAGNOSIS — I10 ESSENTIAL HYPERTENSION: ICD-10-CM

## 2022-11-28 ENCOUNTER — OFFICE VISIT (OUTPATIENT)
Dept: FAMILY MEDICINE CLINIC | Facility: CLINIC | Age: 37
End: 2022-11-28

## 2022-11-28 ENCOUNTER — APPOINTMENT (OUTPATIENT)
Dept: LAB | Facility: CLINIC | Age: 37
End: 2022-11-28

## 2022-11-28 ENCOUNTER — TELEPHONE (OUTPATIENT)
Dept: FAMILY MEDICINE CLINIC | Facility: CLINIC | Age: 37
End: 2022-11-28

## 2022-11-28 VITALS
RESPIRATION RATE: 18 BRPM | HEART RATE: 94 BPM | TEMPERATURE: 97.8 F | SYSTOLIC BLOOD PRESSURE: 130 MMHG | WEIGHT: 234.8 LBS | HEIGHT: 70 IN | DIASTOLIC BLOOD PRESSURE: 88 MMHG | OXYGEN SATURATION: 98 % | BODY MASS INDEX: 33.61 KG/M2

## 2022-11-28 DIAGNOSIS — Z79.899 ENCOUNTER FOR LONG-TERM (CURRENT) USE OF OTHER MEDICATIONS: ICD-10-CM

## 2022-11-28 DIAGNOSIS — K21.9 GASTROESOPHAGEAL REFLUX DISEASE: ICD-10-CM

## 2022-11-28 DIAGNOSIS — K59.00 CONSTIPATION, UNSPECIFIED CONSTIPATION TYPE: ICD-10-CM

## 2022-11-28 DIAGNOSIS — L30.9 DERMATITIS: ICD-10-CM

## 2022-11-28 DIAGNOSIS — K30 INDIGESTION: ICD-10-CM
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
## 2022-11-28 DIAGNOSIS — T14.8XXA ABRASION: ICD-10-CM

## 2022-11-28 DIAGNOSIS — J30.9 ALLERGIC CONJUNCTIVITIS OF BOTH EYES AND RHINITIS: ICD-10-CM

## 2022-11-28 DIAGNOSIS — K08.9 POOR DENTITION: ICD-10-CM

## 2022-11-28 DIAGNOSIS — F98.4 HEAD-BANGING: ICD-10-CM

## 2022-11-28 DIAGNOSIS — G47.00 INSOMNIA: ICD-10-CM

## 2022-11-28 DIAGNOSIS — H10.13 ALLERGIC CONJUNCTIVITIS OF BOTH EYES AND RHINITIS: ICD-10-CM

## 2022-11-28 DIAGNOSIS — R09.81 MILD NASAL CONGESTION: ICD-10-CM

## 2022-11-28 DIAGNOSIS — L70.0 ACNE VULGARIS: ICD-10-CM

## 2022-11-28 DIAGNOSIS — F31.9 BIPOLAR 1 DISORDER (HCC): ICD-10-CM

## 2022-11-28 DIAGNOSIS — R19.7 DIARRHEA, UNSPECIFIED TYPE: ICD-10-CM

## 2022-11-28 PROBLEM — Z09 FOLLOW-UP EXAM: Status: ACTIVE | Noted: 2022-11-28

## 2022-11-28 LAB
ALBUMIN SERPL BCP-MCNC: 3.6 G/DL (ref 3.5–5)
ALP SERPL-CCNC: 54 U/L (ref 46–116)
ALT SERPL W P-5'-P-CCNC: 35 U/L (ref 12–78)
ANION GAP SERPL CALCULATED.3IONS-SCNC: 3 MMOL/L (ref 4–13)
AST SERPL W P-5'-P-CCNC: 23 U/L (ref 5–45)
BASOPHILS # BLD AUTO: 0.06 THOUSANDS/ÂΜL (ref 0–0.1)
BASOPHILS NFR BLD AUTO: 1 % (ref 0–1)
BILIRUB SERPL-MCNC: 0.33 MG/DL (ref 0.2–1)
BUN SERPL-MCNC: 13 MG/DL (ref 5–25)
CALCIUM SERPL-MCNC: 9.9 MG/DL (ref 8.3–10.1)
CHLORIDE SERPL-SCNC: 109 MMOL/L (ref 96–108)
CHOLEST SERPL-MCNC: 193 MG/DL
CO2 SERPL-SCNC: 27 MMOL/L (ref 21–32)
CREAT SERPL-MCNC: 1.01 MG/DL (ref 0.6–1.3)
EOSINOPHIL # BLD AUTO: 0.18 THOUSAND/ÂΜL (ref 0–0.61)
EOSINOPHIL NFR BLD AUTO: 3 % (ref 0–6)
ERYTHROCYTE [DISTWIDTH] IN BLOOD BY AUTOMATED COUNT: 16 % (ref 11.6–15.1)
GFR SERPL CREATININE-BSD FRML MDRD: 95 ML/MIN/1.73SQ M
GLUCOSE P FAST SERPL-MCNC: 105 MG/DL (ref 65–99)
HCT VFR BLD AUTO: 43.2 % (ref 36.5–49.3)
HDLC SERPL-MCNC: 47 MG/DL
HGB BLD-MCNC: 12.5 G/DL (ref 12–17)
IMM GRANULOCYTES # BLD AUTO: 0.03 THOUSAND/UL (ref 0–0.2)
IMM GRANULOCYTES NFR BLD AUTO: 1 % (ref 0–2)
LDLC SERPL CALC-MCNC: 120 MG/DL (ref 0–100)
LITHIUM SERPL-SCNC: 0.7 MMOL/L (ref 0.5–1)
LYMPHOCYTES # BLD AUTO: 1.8 THOUSANDS/ÂΜL (ref 0.6–4.47)
LYMPHOCYTES NFR BLD AUTO: 27 % (ref 14–44)
MCH RBC QN AUTO: 23 PG (ref 26.8–34.3)
MCHC RBC AUTO-ENTMCNC: 28.9 G/DL (ref 31.4–37.4)
MCV RBC AUTO: 80 FL (ref 82–98)
MONOCYTES # BLD AUTO: 0.84 THOUSAND/ÂΜL (ref 0.17–1.22)
MONOCYTES NFR BLD AUTO: 13 % (ref 4–12)
NEUTROPHILS # BLD AUTO: 3.69 THOUSANDS/ÂΜL (ref 1.85–7.62)
NEUTS SEG NFR BLD AUTO: 55 % (ref 43–75)
NONHDLC SERPL-MCNC: 146 MG/DL
NRBC BLD AUTO-RTO: 0 /100 WBCS
PLATELET # BLD AUTO: 266 THOUSANDS/UL (ref 149–390)
PMV BLD AUTO: 11.4 FL (ref 8.9–12.7)
POTASSIUM SERPL-SCNC: 4.5 MMOL/L (ref 3.5–5.3)
PROT SERPL-MCNC: 7.9 G/DL (ref 6.4–8.4)
RBC # BLD AUTO: 5.43 MILLION/UL (ref 3.88–5.62)
SODIUM SERPL-SCNC: 139 MMOL/L (ref 135–147)
T4 SERPL-MCNC: 7.4 UG/DL (ref 4.7–13.3)
TRIGL SERPL-MCNC: 129 MG/DL
TSH SERPL DL<=0.05 MIU/L-ACNC: 0.83 UIU/ML (ref 0.45–4.5)
VALPROATE SERPL-MCNC: 73 UG/ML (ref 50–100)
WBC # BLD AUTO: 6.6 THOUSAND/UL (ref 4.31–10.16)

## 2022-11-28 RX ORDER — LEVOMEFOLATE/ALGAL OIL 15-90.314
15 CAPSULE ORAL DAILY
Qty: 30 CAPSULE | Refills: 3 | Status: SHIPPED | OUTPATIENT
Start: 2022-11-28 | End: 2022-12-28

## 2022-11-28 RX ORDER — SULFACETAMIDE SODIUM 100 MG/ML
LOTION TOPICAL DAILY
Qty: 118 ML | Refills: 0 | Status: SHIPPED | OUTPATIENT
Start: 2022-11-28 | End: 2022-11-30 | Stop reason: SDUPTHER

## 2022-11-28 RX ORDER — BENZOYL PEROXIDE 100 MG/ML
LIQUID TOPICAL
Qty: 237 G | Refills: 0 | Status: SHIPPED | OUTPATIENT
Start: 2022-11-28

## 2022-11-28 RX ORDER — SODIUM FLUORIDE 6 MG/ML
PASTE, DENTIFRICE DENTAL
Qty: 100 ML | Refills: 0 | Status: SHIPPED | OUTPATIENT
Start: 2022-11-28

## 2022-11-28 RX ORDER — L-DESOXYEPHEDRINE 50 MG
INHALER (EA) NASAL 2 TIMES DAILY PRN
Qty: 50 G | Refills: 5 | Status: SHIPPED | OUTPATIENT
Start: 2022-11-28

## 2022-11-28 RX ORDER — AMMONIUM LACTATE 12 G/100G
LOTION TOPICAL
Qty: 225 G | Refills: 0 | Status: SHIPPED | OUTPATIENT
Start: 2022-11-28

## 2022-11-28 RX ORDER — OMEPRAZOLE 20 MG/1
20 CAPSULE, DELAYED RELEASE ORAL DAILY
Qty: 30 CAPSULE | Refills: 3 | Status: SHIPPED | OUTPATIENT
Start: 2022-11-28 | End: 2022-12-28

## 2022-11-28 RX ORDER — FLUTICASONE PROPIONATE 50 MCG
2 SPRAY, SUSPENSION (ML) NASAL DAILY
Qty: 16 G | Refills: 0 | Status: SHIPPED | OUTPATIENT
Start: 2022-11-28 | End: 2022-11-30 | Stop reason: SDUPTHER

## 2022-11-28 RX ORDER — GUAIFENESIN/DEXTROMETHORPHAN 100-10MG/5
5 SYRUP ORAL 3 TIMES DAILY PRN
Qty: 118 ML | Refills: 5 | Status: SHIPPED | OUTPATIENT
Start: 2022-11-28

## 2022-11-28 RX ORDER — BACITRACIN, NEOMYCIN, POLYMYXIN B 400; 3.5; 5 [USP'U]/G; MG/G; [USP'U]/G
OINTMENT TOPICAL
Qty: 15 G | Refills: 5 | Status: SHIPPED | OUTPATIENT
Start: 2022-11-28

## 2022-11-28 RX ORDER — IBUPROFEN 600 MG/1
600 TABLET ORAL EVERY 6 HOURS PRN
Qty: 30 TABLET | Refills: 0 | Status: SHIPPED | OUTPATIENT
Start: 2022-11-28 | End: 2022-12-28

## 2022-11-28 RX ORDER — ACETAMINOPHEN 325 MG/1
325 TABLET ORAL EVERY 4 HOURS PRN
Qty: 30 TABLET | Refills: 5 | Status: SHIPPED | OUTPATIENT
Start: 2022-11-28

## 2022-11-28 RX ORDER — CETIRIZINE HYDROCHLORIDE 5 MG/1
5 TABLET, CHEWABLE ORAL
Qty: 30 TABLET | Refills: 5 | Status: SHIPPED | OUTPATIENT
Start: 2022-11-28

## 2022-11-28 RX ORDER — DOCUSATE SODIUM 100 MG/1
100 CAPSULE, LIQUID FILLED ORAL 2 TIMES DAILY PRN
Qty: 60 CAPSULE | Refills: 12 | Status: SHIPPED | OUTPATIENT
Start: 2022-11-28

## 2022-11-28 NOTE — PROGRESS NOTES
CLINIC VISIT NOTE - Luis Armando EDDY Roatch 40 y o  male MRN: [id number]  Encounter: [id number],  Primary Care Provider: DO Faith Delgado DO  Patient ID: Giana Escobar 40 y o  male    Assessment/Plan:    Follow-up exam  Amado Horan is here for his 4-month follow up exam with caregivers Thao Uriellisha and AMINACOR  He is doing very well, in a happy mood  Caregivers are here to establish care with new PCP for Amado Horan as well as get a refill on medications needed  - Medications excluding psychiatry medications were refilled at this visit  Medications included: dermatologic care, scalp, and dental care medications, anti-inflammatories, daily supplements, digestion medications, and allergy medication  - pt is to follow up in 4-months, as routine    BMI 31 0-31 9,adult  Amado Horan has been playing basketball  Encouraged continuation of activity as well as moderation in food consumption  Recommended cutting back on sugary drinks as well as soda  Diagnoses and all orders for this visit:    BMI 31 0-31 9,adult    Acne vulgaris  -     L-Methylfolate-Algae (Deplin 15) 15-90 314 MG CAPS; Take 15 mg by mouth daily Take 1 tablet by mouth once daily at 8AM  -     benzoyl peroxide 10 % LIQD; USE TO [address] FACE/ SHOULDERS/BACK [email or web address] (ACNE)*STOP USING IF EXCESSIVE IRRIT/RED  -     Sulfacetamide Sodium, Acne, 10 % LOTN; Apply topically in the morning    Bipolar 1 disorder (HCC)    Poor dentition  -     Sodium Fluoride (PreviDent 5000 Booster Plus) 1 1 % PSTE; Pea sized amount to brush at bedtime  -     SODIUM FLUORIDE, DENTAL RINSE, (Listerine Smart Rinse) 0 02 % SOLN; Swish and spit one capful in mouth before bed daily  Gastroesophageal reflux disease  -     omeprazole (PriLOSEC) 20 mg delayed release capsule; Take 1 capsule (20 mg total) by mouth daily    Dermatitis  -     ammonium lactate (LAC-HYDRIN) 12 % lotion; APPLY TOP  TO AFF   AREA ON SKIN TWICE DAILY AS NEEDED (ECZEMA) *BARRIENTOS  -     pyrithione zinc (HEAD AND SHOULDERS) 1 % shampoo; Apply topically daily as needed for dandruff  -     lanolin-mineral oil (BABY OIL) OIL; Apply topically if needed for dry skin Use as needed for dry scalp    Insomnia    Constipation, unspecified constipation type  -     docusate sodium (COLACE) 100 mg capsule; Take 1 capsule (100 mg total) by mouth 2 (two) times a day as needed for constipation    Allergic conjunctivitis of both eyes and rhinitis  -     cetirizine (ZyrTEC) 5 MG chewable tablet; Chew 1 tablet (5 mg total) daily at bedtime as needed for allergies  -     fluticasone (FLONASE) 50 mcg/act nasal spray; 2 sprays into each nostril daily    Mild nasal congestion  -     dextromethorphan-guaiFENesin (ROBITUSSIN DM)  mg/5 mL syrup; Take 5 mL by mouth 3 (three) times a day as needed for cough or congestion  -     Camphor-Eucalyptus-Menthol (Vicks VapoRub) 4 7-1 2-2 6 % OINT; Apply topically 2 (two) times a day as needed (PRN)    Head-banging  -     ibuprofen (MOTRIN) 600 mg tablet; Take 1 tablet (600 mg total) by mouth every 6 (six) hours as needed for mild pain  -     acetaminophen (TYLENOL) 325 mg tablet; Take 1 tablet (325 mg total) by mouth every 4 (four) hours as needed for mild pain or fever    Abrasion  -     neomycin-bacitracin-polymyxin b (NEOSPORIN) ointment; Apply BID PRN for wound care    Diarrhea, unspecified type  -     bismuth subsalicylate (PEPTO BISMOL) 524 mg/30 mL oral suspension; Take 15 mL (262 mg total) by mouth every 6 (six) hours as needed for indigestion or diarrhea    Indigestion  -     bismuth subsalicylate (PEPTO BISMOL) 524 mg/30 mL oral suspension;  Take 15 mL (262 mg total) by mouth every 6 (six) hours as needed for indigestion or diarrhea    Mild sun exposure, subsequent encounter  -     Sunscreens (Sunblock SPF30) LOTN; Apply every 2 hours up to five times daily        Subjective:    Sharmila Burdick is a 45yo M with a PMH of HTN, GERD, Schizophrenia, bipolar disorder, intermittent explosive disorder here today for a 4mo follow up  He is here today with caregivers, Paige Lee and Salvatore jimenez, who report he is doing well and they have no complaints at this time  Per caregivers, Yaquelin Ritter also follows with psychiatry who manages his behavioral medications  His psychiatrist also prescribes 4 month blood work  Which they were given this morning  Review of Systems   Constitutional: Negative for chills and fever  HENT: Negative for sore throat  Respiratory: Negative for cough and shortness of breath  Cardiovascular: Negative for chest pain and palpitations  Gastrointestinal: Negative for abdominal pain and vomiting  Genitourinary: Negative for dysuria and hematuria  Musculoskeletal: Negative for arthralgias and back pain  Skin: Negative for color change and rash  Neurological: Negative for syncope and headaches  Psychiatric/Behavioral: Negative for agitation and behavioral problems  All other systems reviewed and are negative  Objective:    Vitals:    11/28/22 1309   BP: 130/88   Pulse: 94   Resp: 18   Temp: 97 8 °F (36 6 °C)   SpO2: 98%     Physical Exam  Vitals and nursing note reviewed  Constitutional:       General: He is not in acute distress  Appearance: He is well-developed  HENT:      Head: Normocephalic and atraumatic  Nose: Nose normal       Mouth/Throat:      Mouth: Mucous membranes are moist    Eyes:      Conjunctiva/sclera: Conjunctivae normal    Cardiovascular:      Rate and Rhythm: Normal rate and regular rhythm  Heart sounds: No murmur heard  No friction rub  No gallop  Pulmonary:      Effort: Pulmonary effort is normal  No respiratory distress  Breath sounds: Normal breath sounds  No wheezing or rhonchi  Abdominal:      Palpations: Abdomen is soft  Tenderness: There is no abdominal tenderness  Musculoskeletal:         General: No swelling  Cervical back: Neck supple  Skin:     General: Skin is warm and dry  Capillary Refill: Capillary refill takes less than 2 seconds  Neurological:      Mental Status: He is alert  Mental status is at baseline  Psychiatric:         Mood and Affect: Mood normal             Fox Cleveland DO  PGY-1, Family Medicine  11/28/22  1:46 PM    Dear reader, please be aware that portions of my note may contain dictated text  I have done my best to proof-read this note prior to signing  However, there may be occasional unnoticed errors pertaining to "sound-alike" words and/or grammar during my dictation process  If there is any words or information that is unclear or appears erroneous, please kindly let me know and I will clarify and/or addend my notes accordingly  Thank you for your understanding

## 2022-11-28 NOTE — ASSESSMENT & PLAN NOTE
Rashaadradames Gomez has been playing basketball  Encouraged continuation of activity as well as moderation in food consumption  Recommended cutting back on sugary drinks as well as soda

## 2022-11-28 NOTE — ASSESSMENT & PLAN NOTE
Leeanne Kumardevon is here for his 4-month follow up exam with caregivers Surya Croft and KALPANA  He is doing very well, in a happy mood  Caregivers are here to establish care with new PCP for Leeanne Coronel as well as get a refill on medications needed  - Medications excluding psychiatry medications were refilled at this visit  Medications included: dermatologic care, scalp, and dental care medications, anti-inflammatories, daily supplements, digestion medications, and allergy medication     - pt is to follow up in 4-months, as routine

## 2022-11-28 NOTE — TELEPHONE ENCOUNTER
Form reviewed and signed by Dr Yesi Mcdermott by the time of patient appointment  Form placed in the Yellow Folder in the [address] [place] [address] for scan  Original was handed by Dr Yesi Mcdermott to caregiver   Thanks

## 2022-11-29 DIAGNOSIS — I10 ESSENTIAL HYPERTENSION: ICD-10-CM

## 2022-11-29 RX ORDER — AMLODIPINE BESYLATE 5 MG/1
5 TABLET ORAL DAILY
Qty: 30 TABLET | Refills: 3 | Status: SHIPPED | OUTPATIENT
Start: 2022-11-29 | End: 2023-03-16 | Stop reason: SDUPTHER

## 2022-11-30 DIAGNOSIS — J30.9 ALLERGIC CONJUNCTIVITIS OF BOTH EYES AND RHINITIS: ICD-10-CM

## 2022-11-30 DIAGNOSIS — K08.9 POOR DENTITION: ICD-10-CM

## 2022-11-30 DIAGNOSIS — H10.13 ALLERGIC CONJUNCTIVITIS OF BOTH EYES AND RHINITIS: ICD-10-CM

## 2022-11-30 DIAGNOSIS — L70.0 ACNE VULGARIS: ICD-10-CM

## 2022-11-30 RX ORDER — FLUTICASONE PROPIONATE 50 MCG
2 SPRAY, SUSPENSION (ML) NASAL DAILY
Qty: 16 G | Refills: 0 | Status: SHIPPED | OUTPATIENT
Start: 2022-11-30

## 2022-11-30 RX ORDER — SULFACETAMIDE SODIUM 100 MG/ML
LOTION TOPICAL DAILY
Qty: 118 ML | Refills: 0 | Status: SHIPPED | OUTPATIENT
Start: 2022-11-30

## 2022-12-06 RX ORDER — OMEPRAZOLE 20 MG/1
20 CAPSULE, DELAYED RELEASE ORAL DAILY
Qty: 30 CAPSULE | Refills: 5 | Status: CANCELLED | OUTPATIENT
Start: 2022-12-06

## 2022-12-06 RX ORDER — AMLODIPINE BESYLATE 5 MG/1
5 TABLET ORAL DAILY
Qty: 30 TABLET | Refills: 5 | Status: CANCELLED | OUTPATIENT
Start: 2022-12-06

## 2022-12-28 ENCOUNTER — OFFICE VISIT (OUTPATIENT)
Dept: URGENT CARE | Age: 37
End: 2022-12-28

## 2022-12-28 VITALS
HEART RATE: 88 BPM | RESPIRATION RATE: 20 BRPM | TEMPERATURE: 97 F | OXYGEN SATURATION: 99 % | DIASTOLIC BLOOD PRESSURE: 70 MMHG | SYSTOLIC BLOOD PRESSURE: 112 MMHG

## 2022-12-28 DIAGNOSIS — H10.31 ACUTE CONJUNCTIVITIS OF RIGHT EYE, UNSPECIFIED ACUTE CONJUNCTIVITIS TYPE: Primary | ICD-10-CM

## 2022-12-28 RX ORDER — TOBRAMYCIN 3 MG/ML
1 SOLUTION/ DROPS OPHTHALMIC
Qty: 5 ML | Refills: 0 | Status: SHIPPED | OUTPATIENT
Start: 2022-12-28 | End: 2023-01-04

## 2022-12-28 NOTE — PATIENT INSTRUCTIONS
Put eye drop in the right eye every four hourswhile awake  This means he doesn't have to be work up in the middle of the night for it   He will continue this for the next 7 days

## 2022-12-28 NOTE — PROGRESS NOTES
NAME: Chrissy Rick is a 40 y o  male  : 1985    MRN: [id number]    /70   Pulse 88   Temp (!) 97 °F (36 1 °C) (Tympanic)   Resp 20   SpO2 99%     Assessment and Plan   Acute conjunctivitis of right eye, unspecified acute conjunctivitis type [H10 31]  1  Acute conjunctivitis of right eye, unspecified acute conjunctivitis type  tobramycin (TOBREX) 0 3 % SOLN          Star Tao was seen today for conjunctivitis  Diagnoses and all orders for this visit:    Acute conjunctivitis of right eye, unspecified acute conjunctivitis type  -     tobramycin (TOBREX) 0 3 % SOLN; Administer 1 drop to the right eye every 4 (four) hours while awake for 7 days        Patient Instructions   Patient Instructions   Put eye drop in the right eye every four hourswhile awake  This means he doesn't have to be work up in the middle of the night for it  He will continue this for the next 7 days       Proceed to the nearest ER if symptoms worsen, Follow up with your PCP  Continue to social distance, wash your hands, and wear your masks  Please continue to follow the CDC  gov guidelines daily for they are subject to change on COVID-19    Chief Complaint     Chief Complaint   Patient presents with   • Conjunctivitis     Possible pink eye? Pt caretaker also reports while helping pt with shower pt uses medicated Benzyl Peroxide face wash and accidentally got into right eye this morning  Pt reports right eye itchy and burning  Did not rinse eye with anything  History of Present Illness     39 yo M here today with two caregivers with pain to the right eye  Noted today after showed, did get some face wash in it however it is painful swollen and red  He has no FB sesation in the right eye, no blurred vision but has gotten worse since this morning  Pt lives in a group home      Review of Systems   Review of Systems   Eyes: Positive for pain, discharge (clear), redness and itching   Negative for photophobia and visual disturbance  Current Medications       Current Outpatient Medications:   •  acetaminophen (TYLENOL) 325 mg tablet, Take 1 tablet (325 mg total) by mouth every 4 (four) hours as needed for mild pain or fever, Disp: 30 tablet, Rfl: 5  •  amLODIPine (NORVASC) 5 mg tablet, Take 1 tablet (5 mg total) by mouth daily, Disp: 30 tablet, Rfl: 3  •  ammonium lactate (LAC-HYDRIN) 12 % lotion, APPLY TOP  TO AFF   AREA ON SKIN TWICE DAILY AS NEEDED (ECZEMA) *BARRIENTOS, Disp: 225 g, Rfl: 0  •  benzoyl peroxide 10 % LIQD, USE TO WASH FACE/ SHOULDERS/BACK [employer] (ACNE)*STOP USING IF EXCESSIVE IRRIT/RED, Disp: 237 g, Rfl: 0  •  bismuth subsalicylate (PEPTO BISMOL) 524 mg/30 mL oral suspension, Take 15 mL (262 mg total) by mouth every 6 (six) hours as needed for indigestion or diarrhea, Disp: 360 mL, Rfl: 5  •  Camphor-Eucalyptus-Menthol (Vicks VapoRub) 4 7-1 2-2 6 % OINT, Apply topically 2 (two) times a day as needed (PRN), Disp: 50 g, Rfl: 5  •  cetirizine (ZyrTEC) 5 MG chewable tablet, Chew 1 tablet (5 mg total) daily at bedtime as needed for allergies, Disp: 30 tablet, Rfl: 5  •  dextromethorphan-guaiFENesin (ROBITUSSIN DM)  mg/5 mL syrup, Take 5 mL by mouth 3 (three) times a day as needed for cough or congestion, Disp: 118 mL, Rfl: 5  •  divalproex sodium (DEPAKOTE ER) 500 mg 24 hr tablet, Take 3 tablets (1500 mg total) by mouth once daily at 5 pm, Disp: 30 tablet, Rfl: 6  •  docusate sodium (COLACE) 100 mg capsule, Take 1 capsule (100 mg total) by mouth 2 (two) times a day as needed for constipation, Disp: 60 capsule, Rfl: 12  •  fluticasone (FLONASE) 50 mcg/act nasal spray, 2 sprays into each nostril daily, Disp: 16 g, Rfl: 0  •  gabapentin (NEURONTIN) 800 mg tablet, Take 1 tablet (800 mg total) by mouth 3 times daily at 8 am, 4 pm, and 8 pm, Disp: , Rfl: 0  •  guaiFENesin (ROBITUSSIN) 100 MG/5ML oral liquid, Take 200 mg by mouth 3 (three) times a day as needed for cough, Disp: , Rfl:   •  ibuprofen (MOTRIN) 600 mg tablet, Take 1 tablet (600 mg total) by mouth every 6 (six) hours as needed for mild pain, Disp: 30 tablet, Rfl: 0  •  L-Methylfolate-Algae (Deplin 15) 15-90 314 MG CAPS, Take 15 mg by mouth daily Take 1 tablet by mouth once daily at 8AM, Disp: 30 capsule, Rfl: 3  •  lanolin-mineral oil (BABY OIL) OIL, Apply topically if needed for dry skin Use as needed for dry scalp, Disp: 591 mL, Rfl: 3  •  lithium carbonate (LITHOBID) 450 mg CR tablet, Take 2 tablets (900 mg total) by mouth daily at 5:30 pm, Disp: , Rfl: 0  •  LORazepam (ATIVAN) 1 mg tablet, Take 1 mg by mouth 2 (two) times a day , Disp: , Rfl:   •  neomycin-bacitracin-polymyxin b (NEOSPORIN) ointment, Apply BID PRN for wound care, Disp: 15 g, Rfl: 5  •  OLANZapine (ZyPREXA) 20 MG tablet, Take 0 5 tablet (10 mg) by mouth twice daily at 12 pm and 1 5 tab(15mg)  8 pm (Patient taking differently: Take 0 5 tablet (10 mg) by mouth twice daily at 12 pm and (10mg)  8 pm), Disp:  , Rfl: 0  •  omeprazole (PriLOSEC) 20 mg delayed release capsule, Take 1 capsule (20 mg total) by mouth daily, Disp: 30 capsule, Rfl: 3  •  phenol (CHLORASEPTIC) 1 4 % mucosal liquid, Apply 1 spray to the mouth or throat every 2 (two) hours as needed (sore throat), Disp: 118 mL, Rfl: 5  •  pyrithione zinc (HEAD AND SHOULDERS) 1 % shampoo, Apply topically daily as needed for dandruff, Disp: 240 mL, Rfl: 5  •  pyrithione zinc (HEAD AND SHOULDERS) 1 % shampoo, Apply topically daily as needed for dandruff, Disp: 240 mL, Rfl: 3  •  Sodium Fluoride (PreviDent 5000 Booster Plus) 1 1 % PSTE, Pea sized amount to brush at bedtime, Disp: 100 mL, Rfl: 0  •  SODIUM FLUORIDE, DENTAL RINSE, (Listerine Smart Rinse) 0 02 % SOLN, Swish and spit one capful in mouth before bed daily  , Disp: 400 mL, Rfl: 5  •  Sulfacetamide Sodium, Acne, 10 % LOTN, Apply topically in the morning, Disp: 118 mL, Rfl: 0  •  Sunscreens (Sunblock SPF30) LOTN, Apply every 2 hours up to five times daily, Disp: 89 mL, Rfl: 5  •  tobramycin (TOBREX) 0 3 % SOLN, Administer 1 drop to the right eye every 4 (four) hours while awake for 7 days, Disp: 5 mL, Rfl: 0  •  carbamide peroxide (DEBROX) 6 5 % otic solution, Administer 5 drops into both ears 2 (two) times a day for 4 days, Disp: 15 mL, Rfl: 0    Current Facility-Administered Medications:   •  neomycin-bacitracin-polymyxin (NEOSPORIN) ointment, , Topical, TID PRN, David Leroy Yext, DO    Current Allergies     Allergies as of 12/28/2022 - Reviewed 12/28/2022   Allergen Reaction Noted   • Molds & smuts Other (See Comments) 10/29/2021              Past Medical History:   Diagnosis Date   • ADHD (attention deficit hyperactivity disorder)    • Atypical pervasive developmental disorder    • Autism    • Bipolar disorder ([address] )    • Constipation    • Depression    • Head-banging     Last Assessed:  9/1/17   • Hydronephrosis 9/19/2019   • Hyperlipidemia    • Hypertension    • Intermittent explosive disorder    • Oppositional defiant disorder    • Personality disorder ([address] )    • Schizophrenia ([address] )    • 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[address] )    • Seizures ([address] )    • Sleep disorder    • Vitamin D insufficiency     Last Assessed:  6/22/17       Past Surgical History:   Procedure Laterality Date   • NO PAST SURGERIES     • UMBILICAL HERNIA REPAIR         History reviewed  No pertinent family history  Medications have been verified  The following portions of the patient's history were reviewed and updated as appropriate: allergies, current medications, past family history, past medical history, past social history, past surgical history and problem list     Objective   /70   Pulse 88   Temp (!) 97 °F (36 1 °C) (Tympanic)   Resp 20   SpO2 99%      Physical Exam     Physical Exam  Eyes:      General: Lids are normal       Extraocular Movements:      Right eye: Normal extraocular motion and no nystagmus  Left eye: Normal extraocular motion and no nystagmus        Conjunctiva/sclera:      Right eye: Right conjunctiva is injected  Left eye: Left conjunctiva is not injected  No chemosis, exudate or hemorrhage  Pupils: Pupils are equal, round, and reactive to light  Neurological:      Mental Status: He is alert  Note: Portions of this record may have been created with voice recognition software  Occasional wrong word or "sound a like" substitutions may have occurred due to the inherent limitations of voice recognition software  Please read the chart carefully and recognize, using context, where substitutions have occurred  JACOB Moon

## 2023-01-04 ENCOUNTER — TELEPHONE (OUTPATIENT)
Dept: FAMILY MEDICINE CLINIC | Facility: CLINIC | Age: 38
End: 2023-01-04

## 2023-01-04 DIAGNOSIS — J30.9 ALLERGIC CONJUNCTIVITIS OF BOTH EYES AND RHINITIS: ICD-10-CM

## 2023-01-04 DIAGNOSIS — H10.13 ALLERGIC CONJUNCTIVITIS OF BOTH EYES AND RHINITIS: ICD-10-CM

## 2023-01-04 RX ORDER — FLUTICASONE PROPIONATE 50 MCG
2 SPRAY, SUSPENSION (ML) NASAL DAILY
Qty: 16 G | Refills: 0 | Status: CANCELLED | OUTPATIENT
Start: 2023-01-04

## 2023-01-04 NOTE — TELEPHONE ENCOUNTER
Yann Sears patient care giver left message on rx line requesting to change the Flonase as needed no daily  Per Yann Sears a new prescription need to be sent to patient pharmacy or call pharmacy for an verbal order  Please review  Thank You

## 2023-01-05 DIAGNOSIS — J30.9 ALLERGIC CONJUNCTIVITIS OF BOTH EYES AND RHINITIS: ICD-10-CM

## 2023-01-05 DIAGNOSIS — H10.13 ALLERGIC CONJUNCTIVITIS OF BOTH EYES AND RHINITIS: ICD-10-CM

## 2023-01-05 RX ORDER — FLUTICASONE PROPIONATE 50 MCG
2 SPRAY, SUSPENSION (ML) NASAL DAILY PRN
Qty: 16 G | Refills: 5 | Status: SHIPPED | OUTPATIENT
Start: 2023-01-05 | End: 2023-01-08

## 2023-01-05 NOTE — TELEPHONE ENCOUNTER
Patient is part of group home, all Rx has to be specifically marked with directions/times, etc  Please sign off pending Rx

## 2023-01-08 DIAGNOSIS — J30.9 ALLERGIC CONJUNCTIVITIS OF BOTH EYES AND RHINITIS: ICD-10-CM

## 2023-01-08 DIAGNOSIS — L70.0 ACNE VULGARIS: ICD-10-CM

## 2023-01-08 DIAGNOSIS — H10.13 ALLERGIC CONJUNCTIVITIS OF BOTH EYES AND RHINITIS: ICD-10-CM

## 2023-01-08 PROBLEM — S09.90XA HEAD TRAUMA: Status: ACTIVE | Noted: 2021-02-07

## 2023-01-08 RX ORDER — FLUTICASONE PROPIONATE 50 MCG
2 SPRAY, SUSPENSION (ML) NASAL DAILY PRN
Qty: 16 G | Refills: 5 | Status: SHIPPED | OUTPATIENT
Start: 2023-01-08

## 2023-01-08 RX ORDER — BENZOYL PEROXIDE 100 MG/ML
LIQUID TOPICAL
Qty: 237 G | Refills: 0 | Status: SHIPPED | OUTPATIENT
Start: 2023-01-08

## 2023-01-10 ENCOUNTER — OFFICE VISIT (OUTPATIENT)
Dept: DENTISTRY | Facility: CLINIC | Age: 38
End: 2023-01-10

## 2023-01-10 VITALS — DIASTOLIC BLOOD PRESSURE: 95 MMHG | SYSTOLIC BLOOD PRESSURE: 138 MMHG | HEART RATE: 81 BPM | TEMPERATURE: 98.6 F

## 2023-01-10 DIAGNOSIS — Z01.20 ENCOUNTER FOR DENTAL EXAMINATION: Primary | ICD-10-CM

## 2023-01-10 DIAGNOSIS — K02.9 DENTAL CARIES: ICD-10-CM

## 2023-01-10 DIAGNOSIS — K03.6 DENTAL CALCULUS: ICD-10-CM

## 2023-01-10 DIAGNOSIS — K03.6 ACCRETIONS ON TEETH: ICD-10-CM

## 2023-01-10 NOTE — PROGRESS NOTES
RECALL EXAM, ADULT PROPHY AND 4BWX 1 pax  TAKEN WITH SIZE 1 SENSOR     PATIENT PRESENTED WITH CAREGIVER FROM GROUP HOME FOR RECALL VISIT   this provider has established a good rapport with patient over time and he was very receptive to treatment    REVIEWED MED HX: meds, allergies, health changes reviewed in EPIC  CHIEF CONCERN:  SENS  LR  HAS BEEN DISCUSSED BEFORE  PAIN SCALE:  SENS TO COLD OR WHEN THEY BRUSH HIS TEETH  NOTE: he does have some sensory issues per caregivers   ASA CLASS:  II  PLAQUE:    moderate    CALCULUS:  no calculus noted     BLEEDING:   none     STAIN :   none     ORAL HYGIENE:  fair  /needs improvement  Discussed with caregiver  :     Hand scaled, polished and flossed  Oral Hygiene Instruction:  recommended brushing 2 x daily for 2 minutes MIN, recommended flossing daily, reviewed dietary precautions  Visual and Tactile Intraoral/ Extraoral evaluation: Oral and Oropharyngeal cancer evaluation  No findings     Dr Chang Bloodgood exam=   Reviewed with patient clinical and radiographic findings and patient verbalized understanding  All questions and concerns addressed       REFERRALS: no referrals needed  CARIES FINDINGS:   No new decay  As discussed in previous visits, there is nothing we can do to restore # [phone] area   Teeth were crowned years ago and crs have come off   No decay noted  Applied Fl2 varnish and encouraged continued use of Prevident and OTC Fl2 rinse      Encouraged better cervical brushing to reduce decal  And decay    PDIGC   6 mos recall with Faustina Herrera       TREATMENT  PLAN :   1)     Next Recall: 6 month recall     Last BWX:   1/2023  Last [employer]/ FMX :  7/2022

## 2023-01-16 ENCOUNTER — HOSPITAL ENCOUNTER (EMERGENCY)
Facility: HOSPITAL | Age: 38
Discharge: HOME | End: 2023-01-16
Attending: EMERGENCY MEDICINE

## 2023-01-16 ENCOUNTER — APPOINTMENT (EMERGENCY)
Dept: CT IMAGING | Facility: HOSPITAL | Age: 38
End: 2023-01-16

## 2023-01-16 VITALS
DIASTOLIC BLOOD PRESSURE: 88 MMHG | HEART RATE: 111 BPM | TEMPERATURE: 98.6 F | SYSTOLIC BLOOD PRESSURE: 145 MMHG | RESPIRATION RATE: 18 BRPM | OXYGEN SATURATION: 99 %

## 2023-01-16 DIAGNOSIS — S09.90XA INJURY OF HEAD, INITIAL ENCOUNTER: Primary | ICD-10-CM

## 2023-01-16 LAB
LITHIUM SERPL-SCNC: 0.7 MMOL/L (ref 0.6–1.2)
VALPROATE SERPL-MCNC: 46 UG/ML (ref 50–100)

## 2023-01-16 RX ORDER — DIVALPROEX SODIUM 500 MG/1
500 TABLET, EXTENDED RELEASE ORAL ONCE
Status: COMPLETED | OUTPATIENT
Start: 2023-01-16 | End: 2023-01-16

## 2023-01-16 RX ORDER — LITHIUM CARBONATE 450 MG
900 TABLET, EXTENDED RELEASE ORAL ONCE
Status: COMPLETED | OUTPATIENT
Start: 2023-01-16 | End: 2023-01-16

## 2023-01-16 RX ORDER — LORAZEPAM 1 MG/1
1 TABLET ORAL ONCE
Status: COMPLETED | OUTPATIENT
Start: 2023-01-16 | End: 2023-01-16

## 2023-01-16 RX ORDER — GINSENG 100 MG
1 CAPSULE ORAL ONCE
Status: COMPLETED | OUTPATIENT
Start: 2023-01-16 | End: 2023-01-16

## 2023-01-16 RX ADMIN — LORAZEPAM 1 MG: 1 TABLET ORAL at 17:26

## 2023-01-16 RX ADMIN — BACITRACIN ZINC 1 LARGE APPLICATION: 500 OINTMENT TOPICAL at 17:28

## 2023-01-16 RX ADMIN — DIVALPROEX SODIUM 500 MG: 500 TABLET, FILM COATED, EXTENDED RELEASE ORAL at 17:27

## 2023-01-16 RX ADMIN — LITHIUM CARBONATE 900 MG: 450 TABLET, EXTENDED RELEASE ORAL at 17:26

## 2023-01-16 NOTE — ED PROVIDER NOTES
Pt Name: Oscar Casiano  MRN: [id number]  [place] 1985  Age/Sex: 40 y o  male  Date of evaluation: 1/16/2023  PCP: Nichelle Jones, [address] [place] [address]    Chief Complaint   Patient presents with   • Head Injury     Per EMS pt from group home, per staff patient had a normal day but started banging head against concrete  Pt noted to have hematoma on head and bleeding controlled at this time  Pt denies other complaints  HPI    Bi Dailey presents to the Emergency Department after he was repetitively banging his head on a plywood wall  No LOC  No current complaints  He has done this many times before  HPI      Past Medical and Surgical History    Past Medical History:   Diagnosis Date   • ADHD (attention deficit hyperactivity disorder)    • Atypical pervasive developmental disorder    • Autism    • Bipolar disorder ([address] )    • Constipation    • Depression    • Head-banging     Last Assessed:  9/1/17   • Hydronephrosis 9/19/2019   • Hyperlipidemia    • Hypertension    • Intermittent explosive disorder    • Oppositional defiant disorder    • Personality disorder ([address] )    • Schizophrenia ([address] )    • 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[address] )    • Seizures ([address] )    • Sleep disorder    • Vitamin D insufficiency     Last Assessed:  6/22/17       Past Surgical History:   Procedure Laterality Date   • NO PAST SURGERIES     • UMBILICAL HERNIA REPAIR         History reviewed  No pertinent family history  Social History     Tobacco Use   • Smoking status: Never   • Smokeless tobacco: Never   Vaping Use   • Vaping Use: Never used   Substance Use Topics   • Alcohol use: No   • Drug use: No              Allergies    Allergies   Allergen Reactions   • Molds & Smuts Other (See Comments)     Patient does not know  Home Medications    Prior to Admission medications    Medication Sig Start Date End Date Taking?  Authorizing Provider   amLODIPine (NORVASC) 5 mg tablet Take 1 tablet (5 mg total) by mouth daily 11/29/22 1/16/23 Yes Lorie Lawrence, DO   divalproex sodium (DEPAKOTE ER) 500 mg 24 hr tablet Take 3 tablets (1500 mg total) by mouth once daily at 5 pm 12/14/19  Yes Nishant Cain MD   gabapentin (NEURONTIN) 800 mg tablet Take 1 tablet (800 mg total) by mouth 3 times daily at 8 am, 4 pm, and 8 pm 9/25/18  Yes Nishant Cain MD   lithium carbonate (LITHOBID) 450 mg CR tablet Take 2 tablets (900 mg total) by mouth daily at 5:30 pm 9/25/18  Yes Nishant Cain MD   LORazepam (ATIVAN) 1 mg tablet Take 1 mg by mouth 2 (two) times a day  12/17/21  Yes Austin Schultz MD   OLANZapine (ZyPREXA) 20 MG tablet Take 0 5 tablet (10 mg) by mouth twice daily at 12 pm and 1 5 tab(15mg)  8 pm  Patient taking differently: Take 0 5 tablet (10 mg) by mouth twice daily at 12 pm and (10mg)  8 pm 10/7/20  Yes Cyndi Carbone PA-C   omeprazole (PriLOSEC) 20 mg delayed release capsule Take 1 capsule (20 mg total) by mouth daily 11/28/22 1/16/23 Yes Lorie Lawrence, DO   acetaminophen (TYLENOL) 325 mg tablet Take 1 tablet (325 mg total) by mouth every 4 (four) hours as needed for mild pain or fever 11/28/22   Kip Blank Yext, DO   ammonium lactate (LAC-HYDRIN) 12 % lotion APPLY TOP  TO AFF   AREA ON SKIN TWICE DAILY AS NEEDED (ECZEMA) *SALINAS 11/28/22   Lorie Lawrence, DO   benzoyl peroxide 10 % LIQD USE TO [facility] FACE/ SHOULDERS/BACK DAILY  (ACNE)*STOP USING IF EXCESSIVE IRRIT/RED 1/8/23   Kip Blank Yext, DO   bismuth subsalicylate (PEPTO BISMOL) 524 mg/30 mL oral suspension Take 15 mL (262 mg total) by mouth every 6 (six) hours as needed for indigestion or diarrhea 11/28/22   Kip Blank Yext, DO   Camphor-Eucalyptus-Menthol (Vicks VapoRub) 4 7-1 2-2 6 % OINT Apply topically 2 (two) times a day as needed (PRN) 11/28/22   Kip Blank Yext, DO   carbamide peroxide (DEBROX) 6 5 % otic solution Administer 5 drops into both ears 2 (two) times a day for 4 days 7/19/22 11/28/22  Nevaeh Salinas PA-C   cetirizine (ZyrTEC) 5 MG chewable tablet Chew 1 tablet (5 mg total) daily at bedtime as needed for allergies 11/28/22   Marianne Lawrence, DO   dextromethorphan-guaiFENesin (ROBITUSSIN DM)  mg/5 mL syrup Take 5 mL by mouth 3 (three) times a day as needed for cough or congestion 11/28/22   Marianne Lawrence, DO   docusate sodium (COLACE) 100 mg capsule Take 1 capsule (100 mg total) by mouth 2 (two) times a day as needed for constipation 11/28/22   Lorie Lawrence, DO   fluticasone (FLONASE) 50 mcg/act nasal spray 2 sprays into each nostril daily as needed for rhinitis Please take 2 spray, one in each nostril, daily as needed for runny nose and nasal congestion   1/8/23   Lorie Lawrence, DO   guaiFENesin (ROBITUSSIN) 100 MG/5ML oral liquid Take 200 mg by mouth 3 (three) times a day as needed for cough    Historical Provider, MD   ibuprofen (MOTRIN) 600 mg tablet Take 1 tablet (600 mg total) by mouth every 6 (six) hours as needed for mild pain 11/28/22 12/28/22  Lorie Lawrence, DO   L-Methylfolate-Algae (Deplin 15) 15-90 314 MG CAPS Take 15 mg by mouth daily Take 1 tablet by mouth once daily at [facility] 11/28/22 12/28/22  Lorie Lawrence, DO   lanolin-mineral oil (BABY OIL) OIL Apply topically if needed for dry skin Use as needed for dry scalp 11/28/22   Marianne Lawrence, DO   neomycin-bacitracin-polymyxin b (NEOSPORIN) ointment Apply BID PRN for wound care 11/28/22   Marianne Lawrence, DO   phenol (CHLORASEPTIC) 1 4 % mucosal liquid Apply 1 spray to the mouth or throat every 2 (two) hours as needed (sore throat) 10/12/21   Nevaeh Varner PA-C   pyrithione zinc (HEAD AND SHOULDERS) 1 % shampoo Apply topically daily as needed for dandruff 6/24/22   Nevaeh Salinas PA-C   pyrithione zinc (HEAD AND SHOULDERS) 1 % shampoo Apply topically daily as needed for dandruff 11/28/22 12/28/22  Marianne Lawrence, DO   Sodium Fluoride (PreviDent 5000 Booster Plus) 1 1 % PSTE Pea sized amount to brush at bedtime 11/28/22   Lorie Lawrence, DO   SODIUM FLUORIDE, DENTAL RINSE, (Listerine Smart Rinse) 0 02 % SOLN Swish and spit one capful in mouth before bed daily  11/30/22   Kip Blank Yext, DO   Sulfacetamide Sodium, Acne, 10 % LOTN Apply topically in the morning 11/30/22   Kip Blank Yext, DO   Sunscreens (Sunblock SPF30) LOTN Apply every 2 hours up to five times daily 11/28/22   Errol Rush, DO           Review of Systems    Review of Systems   Unable to perform ROS: Psychiatric disorder   Constitutional: Negative for chills and fever  HENT: Negative for ear pain and sore throat  Eyes: Negative for pain and visual disturbance  Respiratory: Negative for cough and shortness of breath  Cardiovascular: Negative for chest pain and palpitations  Gastrointestinal: Negative for abdominal pain and vomiting  Genitourinary: Negative for dysuria and hematuria  Musculoskeletal: Negative for arthralgias and back pain  Skin: Negative for color change and rash  Neurological: Negative for seizures and syncope  All other systems reviewed and are negative  Physical Exam      ED Triage Vitals [01/16/23 1618]   Temperature Pulse Respirations Blood Pressure SpO2   98 6 °F (37 °C) 94 18 (!) 150/103 98 %      Temp Source Heart Rate Source Patient Position - Orthostatic VS BP Location FiO2 (%)   Oral Monitor Lying Left arm --      Pain Score       --               Physical Exam  Vitals and nursing note reviewed  Constitutional:       General: He is not in acute distress  Appearance: He is well-developed  He is not diaphoretic  HENT:      Head: Normocephalic and atraumatic  Comments: Abrasion and hematoma to central forehead at hairline     Nose: Nose normal    Eyes:      Conjunctiva/sclera: Conjunctivae normal       Pupils: Pupils are equal, round, and reactive to light  Cardiovascular:      Rate and Rhythm: Normal rate and regular rhythm  Heart sounds: Normal heart sounds  No murmur heard  No friction rub  No gallop  Pulmonary:      Effort: Pulmonary effort is normal  No respiratory distress  Breath sounds: Normal breath sounds  No wheezing or rales  Abdominal:      General: Bowel sounds are normal       Palpations: Abdomen is soft  Tenderness: There is no abdominal tenderness  There is no guarding or rebound  Musculoskeletal:         General: Normal range of motion  Cervical back: Normal range of motion and neck supple  Skin:     General: Skin is warm and dry  Neurological:      Mental Status: He is alert and oriented to person, place, and time  Psychiatric:         Behavior: Behavior normal            Assessment and Plan    Shey Ramos is a 40 y o  male who presents with hematoma   Physical examination remarkable for hematoma and abrasion  Differential diagnosis (not completely inclusive) includes intracranial injury  Plan will be to perform diagnostic testing and treat symptomatically  MDM  Number of Diagnoses or Management Options  Injury of head, initial encounter  Diagnosis management comments: I spoke with group home staff here with the patient and they feel that he is back to baseline  I called and spoke with the manager at the group home  I updated her on the CT results and they requested that levels for his meds be drawn  The results will not be back prior to discharge so they will follow up on these  Amount and/or Complexity of Data Reviewed  Clinical lab tests: ordered  Tests in the radiology section of CPT®: ordered and reviewed  Decide to obtain previous medical records or to obtain history from someone other than the patient: yes  Obtain history from someone other than the patient: yes  Review and summarize past medical records: yes  Independent visualization of images, tracings, or specimens: yes        Diagnostic Results      Labs:        Radiology:    CT head without contrast   Final Result      No intracranial hemorrhage or calvarial fracture                    Workstation performed: [id number]             All radiology studies independently viewed by me and interpreted by the radiologist     Procedure    Procedures      ED Course of Care and Re-Assessments        Medications   bacitracin topical ointment 1 large application (1 large application Topical Given 1/16/23 1728)   LORazepam (ATIVAN) tablet 1 mg (1 mg Oral Given 1/16/23 1726)   divalproex sodium (DEPAKOTE ER) 24 hr tablet 500 mg (500 mg Oral Given 1/16/23 1727)   lithium carbonate (LITHOBID) CR tablet 900 mg (900 mg Oral Given 1/16/23 1726)           FINAL IMPRESSION    Final diagnoses:   Injury of head, initial encounter         DISPOSITION/PLAN    Time reflects when diagnosis was documented in both MDM as applicable and the Disposition within this note     Time User Action Codes Description Comment    1/16/2023  5:59 PM Lina Ernandez Add [S09 90XA] Injury of head, initial encounter       ED Disposition     ED Disposition   Discharge    Condition   Stable    Date/Time   Mon Jan 16, 2023  [address] discharge to home/self care                 Follow-up Information     Follow up With Specialties Details Why [address] Emergency Department Emergency Medicine  As needed, If symptoms worsen [facility] [id number]  [address] [place] [address] Emergency Department, [address] [place] [address], [address] [place] [address] Avenue TO:    [facility] Emergency Department  [facility] [id number] [phone]    As needed, If symptoms worsen      DISCHARGE MEDICATIONS:    Discharge Medication List as of 1/16/2023  6:00 PM      CONTINUE these medications which have NOT CHANGED    Details   acetaminophen (TYLENOL) 325 mg tablet Take 1 tablet (325 mg total) by mouth every 4 (four) hours as needed for mild pain or fever, Starting Mon 11/28/2022, Normal      amLODIPine (NORVASC) 5 mg tablet Take 1 tablet (5 mg total) by mouth daily, Starting Tue 11/29/2022, Until Mon 1/16/2023, Normal      ammonium lactate (LAC-HYDRIN) 12 % lotion APPLY TOP  TO AFF  AREA ON SKIN TWICE DAILY AS NEEDED (ECZEMA) *BARRIENTOS, Normal      benzoyl peroxide 10 % LIQD USE TO WASH FACE/ SHOULDERS/BACK DAILY  (ACNE)*STOP USING IF EXCESSIVE IRRIT/RED, Normal      bismuth subsalicylate (PEPTO BISMOL) 524 mg/30 mL oral suspension Take 15 mL (262 mg total) by mouth every 6 (six) hours as needed for indigestion or diarrhea, Starting Mon 11/28/2022, Normal      Camphor-Eucalyptus-Menthol (Vicks VapoRub) 4 7-1 2-2 6 % OINT Apply topically 2 (two) times a day as needed (PRN), Starting Mon 11/28/2022, Normal      carbamide peroxide (DEBROX) 6 5 % otic solution Administer 5 drops into both ears 2 (two) times a day for 4 days, Starting Tue 7/19/2022, Until Mon 11/28/2022, Normal      cetirizine (ZyrTEC) 5 MG chewable tablet Chew 1 tablet (5 mg total) daily at bedtime as needed for allergies, Starting Mon 11/28/2022, Normal      dextromethorphan-guaiFENesin (ROBITUSSIN DM)  mg/5 mL syrup Take 5 mL by mouth 3 (three) times a day as needed for cough or congestion, Starting Mon 11/28/2022, Normal      divalproex sodium (DEPAKOTE ER) 500 mg 24 hr tablet Take 3 tablets (1500 mg total) by mouth once daily at 5 pm, Normal      docusate sodium (COLACE) 100 mg capsule Take 1 capsule (100 mg total) by mouth 2 (two) times a day as needed for constipation, Starting Mon 11/28/2022, Normal      fluticasone (FLONASE) 50 mcg/act nasal spray 2 sprays into each nostril daily as needed for rhinitis Please take 2 spray, one in each nostril, daily as needed for runny nose and nasal congestion  , Starting Sun 1/8/2023, Normal      gabapentin (NEURONTIN) 800 mg tablet Take 1 tablet (800 mg total) by mouth 3 times daily at 8 am, 4 pm, and 8 pm, No Print      guaiFENesin (ROBITUSSIN) 100 MG/5ML oral liquid Take 200 mg by mouth 3 (three) times a day as needed for cough, Historical Med ibuprofen (MOTRIN) 600 mg tablet Take 1 tablet (600 mg total) by mouth every 6 (six) hours as needed for mild pain, Starting Mon 11/28/2022, Until Wed 12/28/2022 at 2359, Normal      L-Methylfolate-Algae (Deplin 15) 15-90 314 MG CAPS Take 15 mg by mouth daily Take 1 tablet by mouth once daily at 8AM, Starting Mon 11/28/2022, Until Wed 12/28/2022, Normal      lanolin-mineral oil (BABY OIL) OIL Apply topically if needed for dry skin Use as needed for dry scalp, Starting Mon 11/28/2022, Normal      lithium carbonate (LITHOBID) 450 mg CR tablet Take 2 tablets (900 mg total) by mouth daily at 5:30 pm, No Print      LORazepam (ATIVAN) 1 mg tablet Take 1 mg by mouth 2 (two) times a day , Starting Fri 12/17/2021, Historical Med      neomycin-bacitracin-polymyxin b (NEOSPORIN) ointment Apply BID PRN for wound care, Normal      OLANZapine (ZyPREXA) 20 MG tablet Take 0 5 tablet (10 mg) by mouth twice daily at 12 pm and 1 5 tab(15mg)  8 pm, No Print      omeprazole (PriLOSEC) 20 mg delayed release capsule Take 1 capsule (20 mg total) by mouth daily, Starting Mon 11/28/2022, Until Mon 1/16/2023, Normal      phenol (CHLORASEPTIC) 1 4 % mucosal liquid Apply 1 spray to the mouth or throat every 2 (two) hours as needed (sore throat), Starting Tue 10/12/2021, Normal      pyrithione zinc (HEAD AND SHOULDERS) 1 % shampoo Apply topically daily as needed for dandruff, Starting Fri 6/24/2022, Normal      Sodium Fluoride (PreviDent 5000 Booster Plus) 1 1 % PSTE Pea sized amount to brush at bedtime, Normal      SODIUM FLUORIDE, DENTAL RINSE, (Listerine Smart Rinse) 0 02 % SOLN Swish and spit one capful in mouth before bed daily  , Normal      Sulfacetamide Sodium, Acne, 10 % LOTN Apply topically in the morning, Starting Wed 11/30/2022, Normal      Sunscreens (Sunblock SPF30) LOTN Apply every 2 hours up to five times daily, Normal             No discharge procedures on file           Lanora Look, DO     Lanora Look, DO  01/16/23 1856

## 2023-01-20 ENCOUNTER — OFFICE VISIT (OUTPATIENT)
Dept: FAMILY MEDICINE CLINIC | Facility: CLINIC | Age: 38
End: 2023-01-20

## 2023-01-20 VITALS
DIASTOLIC BLOOD PRESSURE: 83 MMHG | WEIGHT: 232 LBS | SYSTOLIC BLOOD PRESSURE: 139 MMHG | HEART RATE: 86 BPM | BODY MASS INDEX: 34.36 KG/M2 | RESPIRATION RATE: 20 BRPM | TEMPERATURE: 97.8 F | OXYGEN SATURATION: 98 % | HEIGHT: 69 IN

## 2023-01-20 DIAGNOSIS — S09.90XA TRAUMATIC INJURY OF HEAD, INITIAL ENCOUNTER: Primary | ICD-10-CM

## 2023-01-20 NOTE — ASSESSMENT & PLAN NOTE
- Patient with history of intermittent explosive disorder, and schizophrenia, with tendencies hit his head against objects  -January 16 patient was taken to the ED after his caretaker found out that he was continuously banging his head against a plywood wall, denied loss of consciousness   -Per ED evaluation patient appears stable, has mild edema on the front of his head, mental status at baseline, CT head is negative for subdural, epidural hematoma  -After return home from ED patient continues to have stable vital signs, no nauseous vomiting no change from baseline mental status  Plan  -Continue monitoring patient mental status change  -Stop using Neosporin on the laceration  Instead keep it nice and dry    Can use warm compress to decrease swelling

## 2023-01-20 NOTE — PROGRESS NOTES
Name: Anjali Wilks      : 1985      MRN: [id number]  Encounter Provider: Nhung Oliva DO  Encounter Date: 2023   Encounter department: [address] [place] [address]  Traumatic injury of head, initial encounter  Assessment & Plan:  - Patient with history of intermittent explosive disorder, and schizophrenia, with tendencies hit his head against objects  - patient was taken to the ED after his caretaker found out that he was continuously banging his head against a plywood wall, denied loss of consciousness   -Per ED evaluation patient appears stable, has mild edema on the front of his head, mental status at baseline, CT head is negative for subdural, epidural hematoma  -After return home from ED patient continues to have stable vital signs, no nauseous vomiting no change from baseline mental status  Plan  -Continue monitoring patient mental status change  -Stop using Neosporin on the laceration  Instead keep it nice and dry  Can use warm compress to decrease swelling             Subjective      HPI   40years old male with medical conditions of bipolar type I, schizophrenia, intermittent explosive disorder, recurrent self-induced head trauma  Patient follow-up for ER visit  Caretaker reported patient was taken to the ER  for repeatedly banging his head against the plywood wall  Caretaker denied loss of consciousness altered mental status, nauseous vomiting  ED evaluation shows stable vital signs, CT imagings rule out abnormality  Per return from the ED patient mood has been stable, continues to have no nauseous vomiting, mental status has been at baseline  Review of Systems   Constitutional: Negative for chills and fever  HENT: Negative for ear pain and sore throat  Eyes: Negative for pain and visual disturbance  Respiratory: Negative for cough and shortness of breath      Cardiovascular: Negative for chest pain and palpitations  Gastrointestinal: Negative for abdominal pain and vomiting  Genitourinary: Negative for dysuria and hematuria  Musculoskeletal: Negative for arthralgias and back pain  Skin: Positive for wound  Negative for color change and rash  Neurological: Negative for seizures and syncope  All other systems reviewed and are negative  Current Outpatient Medications on File Prior to Visit   Medication Sig   • acetaminophen (TYLENOL) 325 mg tablet Take 1 tablet (325 mg total) by mouth every 4 (four) hours as needed for mild pain or fever   • amLODIPine (NORVASC) 5 mg tablet Take 1 tablet (5 mg total) by mouth daily   • ammonium lactate (LAC-HYDRIN) 12 % lotion APPLY TOP  TO AFF   AREA ON SKIN TWICE DAILY AS NEEDED (ECZEMA) *BARRIENTOS   • benzoyl peroxide 10 % LIQD USE TO WASH FACE/ SHOULDERS/BACK DAILY  (ACNE)*STOP USING IF EXCESSIVE IRRIT/RED   • bismuth subsalicylate (PEPTO BISMOL) 524 mg/30 mL oral suspension Take 15 mL (262 mg total) by mouth every 6 (six) hours as needed for indigestion or diarrhea   • Camphor-Eucalyptus-Menthol (Vicks VapoRub) 4 7-1 2-2 6 % OINT Apply topically 2 (two) times a day as needed (PRN)   • carbamide peroxide (DEBROX) 6 5 % otic solution Administer 5 drops into both ears 2 (two) times a day for 4 days   • cetirizine (ZyrTEC) 5 MG chewable tablet Chew 1 tablet (5 mg total) daily at bedtime as needed for allergies   • dextromethorphan-guaiFENesin (ROBITUSSIN DM)  mg/5 mL syrup Take 5 mL by mouth 3 (three) times a day as needed for cough or congestion   • divalproex sodium (DEPAKOTE ER) 500 mg 24 hr tablet Take 3 tablets (1500 mg total) by mouth once daily at 5 pm   • docusate sodium (COLACE) 100 mg capsule Take 1 capsule (100 mg total) by mouth 2 (two) times a day as needed for constipation   • fluticasone (FLONASE) 50 mcg/act nasal spray 2 sprays into each nostril daily as needed for rhinitis Please take 2 spray, one in each nostril, daily as needed for runny nose and nasal congestion  • gabapentin (NEURONTIN) 800 mg tablet Take 1 tablet (800 mg total) by mouth 3 times daily at 8 am, 4 pm, and 8 pm   • guaiFENesin (ROBITUSSIN) 100 MG/5ML oral liquid Take 200 mg by mouth 3 (three) times a day as needed for cough   • ibuprofen (MOTRIN) 600 mg tablet Take 1 tablet (600 mg total) by mouth every 6 (six) hours as needed for mild pain   • L-Methylfolate-Algae (Deplin 15) 15-90 314 MG CAPS Take 15 mg by mouth daily Take 1 tablet by mouth once daily at 8AM   • lanolin-mineral oil (BABY OIL) OIL Apply topically if needed for dry skin Use as needed for dry scalp   • lithium carbonate (LITHOBID) 450 mg CR tablet Take 2 tablets (900 mg total) by mouth daily at 5:30 pm   • LORazepam (ATIVAN) 1 mg tablet Take 1 mg by mouth 2 (two) times a day    • neomycin-bacitracin-polymyxin b (NEOSPORIN) ointment Apply BID PRN for wound care   • OLANZapine (ZyPREXA) 20 MG tablet Take 0 5 tablet (10 mg) by mouth twice daily at 12 pm and 1 5 tab(15mg)  8 pm (Patient taking differently: Take 0 5 tablet (10 mg) by mouth twice daily at 12 pm and (10mg)  8 pm)   • omeprazole (PriLOSEC) 20 mg delayed release capsule Take 1 capsule (20 mg total) by mouth daily   • phenol (CHLORASEPTIC) 1 4 % mucosal liquid Apply 1 spray to the mouth or throat every 2 (two) hours as needed (sore throat)   • pyrithione zinc (HEAD AND SHOULDERS) 1 % shampoo Apply topically daily as needed for dandruff   • pyrithione zinc (HEAD AND SHOULDERS) 1 % shampoo Apply topically daily as needed for dandruff   • Sodium Fluoride (PreviDent 5000 Booster Plus) 1 1 % PSTE Pea sized amount to brush at bedtime   • SODIUM FLUORIDE, DENTAL RINSE, (Listerine Smart Rinse) 0 02 % SOLN Swish and spit one capful in mouth before bed daily     • Sulfacetamide Sodium, Acne, 10 % LOTN Apply topically in the morning   • Sunscreens (Sunblock SPF30) LOTN Apply every 2 hours up to five times daily       Objective     /83 (BP Location: Right arm, Patient Position: Sitting, Cuff Size: Standard)   Pulse 86   Temp 97 8 °F (36 6 °C) (Temporal)   Resp 20   Ht 5' 9" (1 753 m)   Wt 105 kg (232 lb)   SpO2 98%   BMI 34 26 kg/m²     Physical Exam  HENT:      Head: Normocephalic and atraumatic  Right Ear: External ear normal       Left Ear: External ear normal       Nose: No congestion  Eyes:      General: No scleral icterus  Cardiovascular:      Rate and Rhythm: Normal rate and regular rhythm  Heart sounds: No murmur heard  Pulmonary:      Effort: Pulmonary effort is normal  No respiratory distress  Abdominal:      General: There is no distension  Palpations: Abdomen is soft  There is no mass  Tenderness: There is no abdominal tenderness  Musculoskeletal:         General: No swelling or deformity  Cervical back: No rigidity  Lymphadenopathy:      Cervical: No cervical adenopathy  Skin:     Coloration: Skin is not jaundiced  Findings: Lesion present  No bruising  Comments: Frontal head laceration, no bleeding, normal erythematous change no discharge,    Neurological:      General: No focal deficit present  Mental Status: He is alert  Mental status is at baseline     Psychiatric:         Mood and Affect: Mood normal        Magdiel Saul DO

## 2023-02-16 ENCOUNTER — TELEPHONE (OUTPATIENT)
Dept: FAMILY MEDICINE CLINIC | Facility: CLINIC | Age: 38
End: 2023-02-16

## 2023-02-16 ENCOUNTER — OFFICE VISIT (OUTPATIENT)
Dept: FAMILY MEDICINE CLINIC | Facility: CLINIC | Age: 38
End: 2023-02-16

## 2023-02-16 VITALS
WEIGHT: 230 LBS | RESPIRATION RATE: 19 BRPM | OXYGEN SATURATION: 100 % | SYSTOLIC BLOOD PRESSURE: 136 MMHG | DIASTOLIC BLOOD PRESSURE: 90 MMHG | HEART RATE: 83 BPM | HEIGHT: 69 IN | TEMPERATURE: 98.7 F | BODY MASS INDEX: 34.07 KG/M2

## 2023-02-16 DIAGNOSIS — L70.0 ACNE VULGARIS: ICD-10-CM

## 2023-02-16 DIAGNOSIS — R11.2 NAUSEA AND VOMITING, UNSPECIFIED VOMITING TYPE: Primary | ICD-10-CM

## 2023-02-16 RX ORDER — BENZOYL PEROXIDE 100 MG/ML
LIQUID TOPICAL
Qty: 237 G | Refills: 0 | Status: SHIPPED | OUTPATIENT
Start: 2023-02-16

## 2023-02-16 RX ORDER — ONDANSETRON 4 MG/1
4 TABLET, ORALLY DISINTEGRATING ORAL EVERY 8 HOURS PRN
COMMUNITY
Start: 2023-02-11 | End: 2023-02-21

## 2023-02-16 NOTE — TELEPHONE ENCOUNTER
Form completed at appt time  Copy placed in [facility] clerical folder

## 2023-02-16 NOTE — PROGRESS NOTES
Name: Mable Garcia      : 1985      MRN: [id number]  Encounter Provider: Mike Gallardo DO  Encounter Date: 2023   Encounter department: [address] [place] [address]     1  Nausea and vomiting, unspecified vomiting type  Assessment & Plan:  - Pt followed today s/p ED visit on  for N/V  ED work up showed normal blood work and Xray  Pt was given Zofran and D/C home  - Per care taker, no additional episode of emesis nor additional dose of Zofran, no melena, hematochezia  - PO intake back to baseline, benign abdomen with active BS  - Continue Zofran Prn, monitor PO intake         2  Acne vulgaris  -     benzoyl peroxide 10 % LIQD; USE TO [address] FACE/ SHOULDERS/BACK As needed  (ACNE)*STOP USING IF EXCESSIVE IRRIT/RED           Subjective      HPI   41years old male with history of developmental delay, intermittent explosive disorder, and bipolar type I; present today status post ED visit on   Patient was taken to the ED on the  for symptoms of nausea and vomiting  Report patient did not have any episode of fever, melena hematochezia or viral-like illness prior to this ED visit  In the ED patient was given Zofran, blood work and chest x-rays obtained all were unremarkable  Patient discharged home with Zofran as needed, but caretaker no additional dose of Zofran needed, an episode of emesis hematochezia melena patient able to tolerate his diet well  Review of Systems   Constitutional: Negative for chills and fever  HENT: Negative for ear pain and sore throat  Eyes: Negative for pain and visual disturbance  Respiratory: Negative for cough and shortness of breath  Cardiovascular: Negative for chest pain and palpitations  Gastrointestinal: Negative for abdominal pain and vomiting  Genitourinary: Negative for dysuria and hematuria  Musculoskeletal: Negative for arthralgias and back pain     Skin: Negative for color change and rash    Neurological: Negative for seizures and syncope  All other systems reviewed and are negative  Current Outpatient Medications on File Prior to Visit   Medication Sig   • acetaminophen (TYLENOL) 325 mg tablet Take 1 tablet (325 mg total) by mouth every 4 (four) hours as needed for mild pain or fever   • amLODIPine (NORVASC) 5 mg tablet Take 1 tablet (5 mg total) by mouth daily   • ammonium lactate (LAC-HYDRIN) 12 % lotion APPLY TOP  TO AFF  AREA ON SKIN TWICE DAILY AS NEEDED (ECZEMA) *BARRIENTOS   • bismuth subsalicylate (PEPTO BISMOL) 524 mg/30 mL oral suspension Take 15 mL (262 mg total) by mouth every 6 (six) hours as needed for indigestion or diarrhea   • Camphor-Eucalyptus-Menthol (Vicks VapoRub) 4 7-1 2-2 6 % OINT Apply topically 2 (two) times a day as needed (PRN)   • cetirizine (ZyrTEC) 5 MG chewable tablet Chew 1 tablet (5 mg total) daily at bedtime as needed for allergies   • dextromethorphan-guaiFENesin (ROBITUSSIN DM)  mg/5 mL syrup Take 5 mL by mouth 3 (three) times a day as needed for cough or congestion   • divalproex sodium (DEPAKOTE ER) 500 mg 24 hr tablet Take 3 tablets (1500 mg total) by mouth once daily at 5 pm   • docusate sodium (COLACE) 100 mg capsule Take 1 capsule (100 mg total) by mouth 2 (two) times a day as needed for constipation   • fluticasone (FLONASE) 50 mcg/act nasal spray 2 sprays into each nostril daily as needed for rhinitis Please take 2 spray, one in each nostril, daily as needed for runny nose and nasal congestion     • gabapentin (NEURONTIN) 800 mg tablet Take 1 tablet (800 mg total) by mouth 3 times daily at 8 am, 4 pm, and 8 pm   • guaiFENesin (ROBITUSSIN) 100 MG/5ML oral liquid Take 200 mg by mouth 3 (three) times a day as needed for cough   • lanolin-mineral oil (BABY OIL) OIL Apply topically if needed for dry skin Use as needed for dry scalp   • lithium carbonate (LITHOBID) 450 mg CR tablet Take 2 tablets (900 mg total) by mouth daily at 5:30 pm   • LORazepam (ATIVAN) 1 mg tablet Take 1 mg by mouth 2 (two) times a day    • neomycin-bacitracin-polymyxin b (NEOSPORIN) ointment Apply BID PRN for wound care   • OLANZapine (ZyPREXA) 20 MG tablet Take 0 5 tablet (10 mg) by mouth twice daily at 12 pm and 1 5 tab(15mg)  8 pm (Patient taking differently: Take 0 5 tablet (10 mg) by mouth twice daily at 12 pm and (10mg)  8 pm)   • ondansetron (ZOFRAN-ODT) 4 mg disintegrating tablet Take 4 mg by mouth every 8 (eight) hours as needed   • phenol (CHLORASEPTIC) 1 4 % mucosal liquid Apply 1 spray to the mouth or throat every 2 (two) hours as needed (sore throat)   • pyrithione zinc (HEAD AND SHOULDERS) 1 % shampoo Apply topically daily as needed for dandruff   • Sodium Fluoride (PreviDent 5000 Booster Plus) 1 1 % PSTE Pea sized amount to brush at bedtime   • SODIUM FLUORIDE, DENTAL RINSE, (Listerine Smart Rinse) 0 02 % SOLN Swish and spit one capful in mouth before bed daily     • Sulfacetamide Sodium, Acne, 10 % LOTN Apply topically in the morning   • Sunscreens (Sunblock SPF30) LOTN Apply every 2 hours up to five times daily   • [DISCONTINUED] benzoyl peroxide 10 % LIQD USE TO WASH FACE/ SHOULDERS/BACK DAILY  (ACNE)*STOP USING IF EXCESSIVE IRRIT/RED   • carbamide peroxide (DEBROX) 6 5 % otic solution Administer 5 drops into both ears 2 (two) times a day for 4 days   • ibuprofen (MOTRIN) 600 mg tablet Take 1 tablet (600 mg total) by mouth every 6 (six) hours as needed for mild pain   • L-Methylfolate-Algae (Deplin 15) 15-90 314 MG CAPS Take 15 mg by mouth daily Take 1 tablet by mouth once daily at 8AM   • omeprazole (PriLOSEC) 20 mg delayed release capsule Take 1 capsule (20 mg total) by mouth daily   • pyrithione zinc (HEAD AND SHOULDERS) 1 % shampoo Apply topically daily as needed for dandruff       Objective     /90 (BP Location: Left arm, Patient Position: Sitting, Cuff Size: Standard)   Pulse 83   Temp 98 7 °F (37 1 °C) (Temporal)   Resp 19   Ht 5' 9" (1 753 m) Wt 104 kg (230 lb)   SpO2 100%   BMI 33 97 kg/m²     Physical Exam  Constitutional:       General: He is not in acute distress  Appearance: He is obese  He is not toxic-appearing  HENT:      Head: Normocephalic and atraumatic  Nose: No congestion or rhinorrhea  Mouth/Throat:      Pharynx: No oropharyngeal exudate or posterior oropharyngeal erythema  Eyes:      General: No scleral icterus  Cardiovascular:      Rate and Rhythm: Normal rate and regular rhythm  Heart sounds: No murmur heard  Pulmonary:      Effort: No respiratory distress  Breath sounds: No wheezing  Abdominal:      General: There is no distension  Palpations: Abdomen is soft  Tenderness: There is no abdominal tenderness  There is no guarding or rebound  Musculoskeletal:         General: No swelling or deformity  Skin:     General: Skin is warm  Capillary Refill: Capillary refill takes less than 2 seconds  Coloration: Skin is not jaundiced  Findings: No bruising  Neurological:      General: No focal deficit present  Mental Status: He is alert and oriented to person, place, and time  Mental status is at baseline     Psychiatric:      Comments: No  Changes from baseline        Magdiel Saul DO

## 2023-02-16 NOTE — ASSESSMENT & PLAN NOTE
- Pt followed today s/p ED visit on 2/11 for N/V  ED work up showed normal blood work and [employer]  Pt was given Zofran and D/C home  - Per care taker, no additional episode of emesis nor additional dose of Zofran, no melena, hematochezia     - PO intake back to baseline, benign abdomen with active BS  - Continue Zofran Prn, monitor PO intake

## 2023-02-16 NOTE — TELEPHONE ENCOUNTER
Folder (To be completed by) - Dr Anna Lofton     Name of Form - Person Directed Supports medical examination casenote      Color folder (Yellow)    Form to be completed at visit and give to patient     Patient was made aware of the 7-10 business day form policy

## 2023-03-16 DIAGNOSIS — I10 ESSENTIAL HYPERTENSION: ICD-10-CM

## 2023-03-16 DIAGNOSIS — K21.9 GASTROESOPHAGEAL REFLUX DISEASE: ICD-10-CM

## 2023-03-16 NOTE — TELEPHONE ENCOUNTER
Codie Chapman is calling for refills      omeprazole (PriLOSEC) 20 mg delayed release capsule [[id number]]  ENDED    amLOPIDne 5 mg      Codie Chapman can be reached at  [phone]

## 2023-03-17 RX ORDER — AMLODIPINE BESYLATE 5 MG/1
5 TABLET ORAL DAILY
Qty: 30 TABLET | Refills: 3 | Status: SHIPPED | OUTPATIENT
Start: 2023-03-17 | End: 2023-03-22 | Stop reason: SDUPTHER

## 2023-03-17 RX ORDER — OMEPRAZOLE 20 MG/1
20 CAPSULE, DELAYED RELEASE ORAL DAILY
Qty: 30 CAPSULE | Refills: 3 | Status: SHIPPED | OUTPATIENT
Start: 2023-03-17 | End: 2023-03-22 | Stop reason: SDUPTHER

## 2023-03-21 DIAGNOSIS — L70.0 ACNE VULGARIS: ICD-10-CM

## 2023-03-21 RX ORDER — BENZOYL PEROXIDE 100 MG/ML
LIQUID TOPICAL
Qty: 237 G | Refills: 0 | Status: SHIPPED | OUTPATIENT
Start: 2023-03-21 | End: 2023-03-22 | Stop reason: SDUPTHER

## 2023-03-21 NOTE — ASSESSMENT & PLAN NOTE
Rochelle Nick is here for his 4-month follow up exam with caregivers Fabiola Baugh and KALPANA  He is doing very well, in a happy mood  Caregivers are here to establish care with new PCP for Rochelle Nick as well as get a refill on medications needed  - Medications excluding psychiatry medications were refilled at this visit  Medications included: dermatologic care, scalp, and dental care medications, anti-inflammatories, daily supplements, digestion medications, and allergy medication     - pt is to follow up in 4-months, as routine

## 2023-03-21 NOTE — PROGRESS NOTES
Name: Carina Mccarthy      : 1985      MRN: [id number]  Encounter Provider: Shin Aguilar DO  Encounter Date: 3/22/2023   Encounter department: [address] [place] [address]     1  Acne vulgaris  -     benzoyl peroxide 10 % LIQD; USE TO [address] FACE/ SHOULDERS/BACK As needed  (ACNE)*STOP USING IF EXCESSIVE IRRIT/RED  -     L-Methylfolate-Algae (Deplin 15) 15-90 314 MG CAPS; Take 15 mg by mouth daily Take 1 tablet by mouth once daily at 8AM  -     Sulfacetamide Sodium, Acne, 10 % LOTN; Apply topically in the morning    2  Follow-up exam  Assessment & Plan:  Kierra Carmona is here for his 4-month follow up exam with caregivers Sunny Santoro and KALPANA  He is doing very well, in a happy mood  Caregivers are here to establish care with new PCP for Kierra Carmona as well as get a refill on medications needed  - Medications excluding psychiatry medications were refilled at this visit  Medications included: dermatologic care, scalp, and dental care medications, anti-inflammatories, daily supplements, digestion medications, and allergy medication  - pt is to follow up in 4-months, as routine      3  Head-banging  -     acetaminophen (TYLENOL) 325 mg tablet; Take 1 tablet (325 mg total) by mouth every 4 (four) hours as needed for mild pain or fever  -     ibuprofen (MOTRIN) 600 mg tablet; Take 1 tablet (600 mg total) by mouth every 6 (six) hours as needed for mild pain    4  Essential hypertension  -     amLODIPine (NORVASC) 5 mg tablet; Take 1 tablet (5 mg total) by mouth daily    5  Dermatitis  -     ammonium lactate (LAC-HYDRIN) 12 % lotion; APPLY TOP  TO AFF  AREA ON SKIN TWICE DAILY AS NEEDED (ECZEMA) *BARRIENTOS  -     lanolin-mineral oil (BABY OIL) OIL; Apply topically if needed for dry skin Use as needed for dry scalp  -     pyrithione zinc (HEAD AND SHOULDERS) 1 % shampoo; Apply topically daily as needed for dandruff    6   Diarrhea, unspecified type  -     bismuth subsalicylate (PEPTO BISMOL) 524 mg/30 mL oral suspension; Take 15 mL (262 mg total) by mouth every 6 (six) hours as needed for indigestion or diarrhea    7  Indigestion  -     bismuth subsalicylate (PEPTO BISMOL) 524 mg/30 mL oral suspension; Take 15 mL (262 mg total) by mouth every 6 (six) hours as needed for indigestion or diarrhea    8  Mild nasal congestion  -     Camphor-Eucalyptus-Menthol (Vicks VapoRub) 4 7-1 2-2 6 % OINT; Apply topically 2 (two) times a day as needed (PRN)  -     dextromethorphan-guaiFENesin (ROBITUSSIN DM)  mg/5 mL syrup; Take 5 mL by mouth 3 (three) times a day as needed for cough or congestion  -     guaiFENesin (ROBITUSSIN) 100 MG/5ML oral liquid; Take 10 mL (200 mg total) by mouth 3 (three) times a day as needed for cough    9  Impacted cerumen of right ear  -     carbamide peroxide (DEBROX) 6 5 % otic solution; Administer 5 drops into both ears 2 (two) times a day for 4 days    10  Allergic conjunctivitis of both eyes and rhinitis  -     cetirizine (ZyrTEC) 5 MG chewable tablet; Chew 1 tablet (5 mg total) daily at bedtime as needed for allergies  -     fluticasone (FLONASE) 50 mcg/act nasal spray; 2 sprays into each nostril daily as needed for rhinitis Please take 2 spray, one in each nostril, daily as needed for runny nose and nasal congestion  11  Constipation, unspecified constipation type  -     docusate sodium (COLACE) 100 mg capsule; Take 1 capsule (100 mg total) by mouth 2 (two) times a day as needed for constipation    12  Insomnia  -     gabapentin (NEURONTIN) 800 mg tablet; Take 1 tablet (800 mg total) by mouth 3 times daily at 8 am, 4 pm, and 8 pm    13  Abrasion  -     neomycin-bacitracin-polymyxin b (NEOSPORIN) ointment; Apply BID PRN for wound care    14  Gastroesophageal reflux disease  -     omeprazole (PriLOSEC) 20 mg delayed release capsule; Take 1 capsule (20 mg total) by mouth daily    15  Sore throat  -     phenol (CHLORASEPTIC) 1 4 % mucosal liquid;  Apply 1 spray to the mouth or throat every 2 (two) hours as needed (sore throat)    16  Dandruff  -     pyrithione zinc (HEAD AND SHOULDERS) 1 % shampoo; Apply topically daily as needed for dandruff    17  Poor dentition  -     Sodium Fluoride (PreviDent 5000 Booster Plus) 1 1 % PSTE; Pea sized amount to brush at bedtime  -     SODIUM FLUORIDE, DENTAL RINSE, (Listerine Smart Rinse) 0 02 % SOLN; Swish and spit one capful in mouth before bed daily  18  Mild sun exposure, subsequent encounter  -     Sunscreens (Sunblock SPF30) LOTN; Apply every 2 hours up to five times daily         Subjective      HPI   Cinda Guaman is a 45yo M with a PMH of HTN, GERD, Schizophrenia, bipolar disorder, intermittent explosive disorder here today for a 3mo follow up  He is here today with caregivers, Raji Yu and George Guerra, who report he is doing well and they have no complaints at this time  Cinda Guaman continues to follow with psychiatry who manages his behavioral medications  He states that he has been feeling overall well  He has not been depressed  He is currently excited about a new poster that is coming in the mail for him soon  Review of Systems   Constitutional: Negative for chills and fever  HENT: Negative for sore throat  Respiratory: Negative for cough and shortness of breath  Cardiovascular: Negative for chest pain and palpitations  Gastrointestinal: Negative for abdominal pain and vomiting  Genitourinary: Negative for dysuria and hematuria  Musculoskeletal: Negative for arthralgias and back pain  Skin: Negative for color change and rash  Neurological: Negative for syncope and headaches  Psychiatric/Behavioral: Negative for agitation and behavioral problems  All other systems reviewed and are negative        Current Outpatient Medications on File Prior to Visit   Medication Sig   • divalproex sodium (DEPAKOTE ER) 500 mg 24 hr tablet Take 3 tablets (1500 mg total) by mouth once daily at 5 pm   • lithium carbonate (LITHOBID) 450 mg CR tablet Take 2 tablets (900 mg total) by mouth daily at 5:30 pm   • LORazepam (ATIVAN) 1 mg tablet Take 1 mg by mouth 2 (two) times a day    • [DISCONTINUED] acetaminophen (TYLENOL) 325 mg tablet Take 1 tablet (325 mg total) by mouth every 4 (four) hours as needed for mild pain or fever   • [DISCONTINUED] amLODIPine (NORVASC) 5 mg tablet Take 1 tablet (5 mg total) by mouth daily   • [DISCONTINUED] ammonium lactate (LAC-HYDRIN) 12 % lotion APPLY TOP  TO AFF  AREA ON SKIN TWICE DAILY AS NEEDED (ECZEMA) *BARRIENTOS   • [DISCONTINUED] benzoyl peroxide 10 % LIQD USE TO WASH FACE/ SHOULDERS/BACK As needed  (ACNE)*STOP USING IF EXCESSIVE IRRIT/RED   • [DISCONTINUED] bismuth subsalicylate (PEPTO BISMOL) 524 mg/30 mL oral suspension Take 15 mL (262 mg total) by mouth every 6 (six) hours as needed for indigestion or diarrhea   • [DISCONTINUED] Camphor-Eucalyptus-Menthol (Vicks VapoRub) 4 7-1 2-2 6 % OINT Apply topically 2 (two) times a day as needed (PRN)   • [DISCONTINUED] cetirizine (ZyrTEC) 5 MG chewable tablet Chew 1 tablet (5 mg total) daily at bedtime as needed for allergies   • [DISCONTINUED] dextromethorphan-guaiFENesin (ROBITUSSIN DM)  mg/5 mL syrup Take 5 mL by mouth 3 (three) times a day as needed for cough or congestion   • [DISCONTINUED] docusate sodium (COLACE) 100 mg capsule Take 1 capsule (100 mg total) by mouth 2 (two) times a day as needed for constipation   • [DISCONTINUED] fluticasone (FLONASE) 50 mcg/act nasal spray 2 sprays into each nostril daily as needed for rhinitis Please take 2 spray, one in each nostril, daily as needed for runny nose and nasal congestion     • [DISCONTINUED] gabapentin (NEURONTIN) 800 mg tablet Take 1 tablet (800 mg total) by mouth 3 times daily at 8 am, 4 pm, and 8 pm   • [DISCONTINUED] guaiFENesin (ROBITUSSIN) 100 MG/5ML oral liquid Take 200 mg by mouth 3 (three) times a day as needed for cough   • [DISCONTINUED] lanolin-mineral oil (BABY OIL) OIL Apply topically if needed for dry skin Use as needed for dry scalp   • [DISCONTINUED] neomycin-bacitracin-polymyxin b (NEOSPORIN) ointment Apply BID PRN for wound care   • [DISCONTINUED] omeprazole (PriLOSEC) 20 mg delayed release capsule Take 1 capsule (20 mg total) by mouth daily   • [DISCONTINUED] phenol (CHLORASEPTIC) 1 4 % mucosal liquid Apply 1 spray to the mouth or throat every 2 (two) hours as needed (sore throat)   • [DISCONTINUED] pyrithione zinc (HEAD AND SHOULDERS) 1 % shampoo Apply topically daily as needed for dandruff   • [DISCONTINUED] Sodium Fluoride (PreviDent 5000 Booster Plus) 1 1 % PSTE Pea sized amount to brush at bedtime   • [DISCONTINUED] SODIUM FLUORIDE, DENTAL RINSE, (Listerine Smart Rinse) 0 02 % SOLN Swish and spit one capful in mouth before bed daily  • [DISCONTINUED] Sulfacetamide Sodium, Acne, 10 % LOTN Apply topically in the morning   • [DISCONTINUED] Sunscreens (Sunblock SPF30) LOTN Apply every 2 hours up to five times daily   • OLANZapine (ZyPREXA) 20 MG tablet Take 0 5 tablet (10 mg) by mouth twice daily at 12 pm and 1 5 tab(15mg)  8 pm (Patient taking differently: Take 0 5 tablet (10 mg) by mouth twice daily at 12 pm and (10mg)  8 pm)   • [DISCONTINUED] carbamide peroxide (DEBROX) 6 5 % otic solution Administer 5 drops into both ears 2 (two) times a day for 4 days   • [DISCONTINUED] ibuprofen (MOTRIN) 600 mg tablet Take 1 tablet (600 mg total) by mouth every 6 (six) hours as needed for mild pain   • [DISCONTINUED] L-Methylfolate-Algae (Deplin 15) 15-90 314 MG CAPS Take 15 mg by mouth daily Take 1 tablet by mouth once daily at 8AM   • [DISCONTINUED] pyrithione zinc (HEAD AND SHOULDERS) 1 % shampoo Apply topically daily as needed for dandruff       Objective     /100   Pulse 94   Temp 98 2 °F (36 8 °C)   Resp 18   Ht 5' 9 5" (1 765 m)   Wt 107 kg (235 lb 9 6 oz)   BMI 34 29 kg/m²     Physical Exam  Constitutional:       General: He is not in acute distress  Appearance: He is obese   He is not toxic-appearing  HENT:      Head: Normocephalic and atraumatic  Nose: No congestion or rhinorrhea  Mouth/Throat:      Pharynx: No oropharyngeal exudate or posterior oropharyngeal erythema  Eyes:      General: No scleral icterus  Cardiovascular:      Rate and Rhythm: Normal rate and regular rhythm  Heart sounds: No murmur heard  Pulmonary:      Effort: No respiratory distress  Breath sounds: No wheezing  Abdominal:      General: There is no distension  Palpations: Abdomen is soft  Tenderness: There is no abdominal tenderness  There is no guarding or rebound  Musculoskeletal:         General: No swelling or deformity  Skin:     General: Skin is warm  Capillary Refill: Capillary refill takes less than 2 seconds  Coloration: Skin is not jaundiced  Findings: No bruising  Neurological:      General: No focal deficit present  Mental Status: He is alert and oriented to person, place, and time  Mental status is at baseline     Psychiatric:      Comments: No  Changes from baseline        [facility], DO

## 2023-03-22 ENCOUNTER — OFFICE VISIT (OUTPATIENT)
Dept: FAMILY MEDICINE CLINIC | Facility: CLINIC | Age: 38
End: 2023-03-22

## 2023-03-22 ENCOUNTER — TELEPHONE (OUTPATIENT)
Dept: FAMILY MEDICINE CLINIC | Facility: CLINIC | Age: 38
End: 2023-03-22

## 2023-03-22 VITALS
RESPIRATION RATE: 18 BRPM | HEART RATE: 94 BPM | DIASTOLIC BLOOD PRESSURE: 100 MMHG | SYSTOLIC BLOOD PRESSURE: 135 MMHG | BODY MASS INDEX: 33.73 KG/M2 | HEIGHT: 70 IN | TEMPERATURE: 98.2 F | WEIGHT: 235.6 LBS

## 2023-03-22 DIAGNOSIS — H10.13 ALLERGIC CONJUNCTIVITIS OF BOTH EYES AND RHINITIS: ICD-10-CM

## 2023-03-22 DIAGNOSIS — L21.0 DANDRUFF: ICD-10-CM

## 2023-03-22 DIAGNOSIS — L30.9 DERMATITIS: ICD-10-CM
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
## 2023-03-22 DIAGNOSIS — K08.9 POOR DENTITION: ICD-10-CM

## 2023-03-22 DIAGNOSIS — H61.21 IMPACTED CERUMEN OF RIGHT EAR: ICD-10-CM

## 2023-03-22 DIAGNOSIS — K30 INDIGESTION: ICD-10-CM

## 2023-03-22 DIAGNOSIS — K59.00 CONSTIPATION, UNSPECIFIED CONSTIPATION TYPE: ICD-10-CM

## 2023-03-22 DIAGNOSIS — J30.9 ALLERGIC CONJUNCTIVITIS OF BOTH EYES AND RHINITIS: ICD-10-CM

## 2023-03-22 DIAGNOSIS — R19.7 DIARRHEA, UNSPECIFIED TYPE: ICD-10-CM

## 2023-03-22 DIAGNOSIS — I10 ESSENTIAL HYPERTENSION: ICD-10-CM

## 2023-03-22 DIAGNOSIS — K21.9 GASTROESOPHAGEAL REFLUX DISEASE: ICD-10-CM

## 2023-03-22 DIAGNOSIS — T14.8XXA ABRASION: ICD-10-CM

## 2023-03-22 DIAGNOSIS — J02.9 SORE THROAT: ICD-10-CM

## 2023-03-22 DIAGNOSIS — Z09 FOLLOW-UP EXAM: ICD-10-CM

## 2023-03-22 DIAGNOSIS — L70.0 ACNE VULGARIS: Primary | ICD-10-CM

## 2023-03-22 DIAGNOSIS — R09.81 MILD NASAL CONGESTION: ICD-10-CM

## 2023-03-22 DIAGNOSIS — F98.4 HEAD-BANGING: ICD-10-CM

## 2023-03-22 DIAGNOSIS — G47.00 INSOMNIA: ICD-10-CM

## 2023-03-22 RX ORDER — DOCUSATE SODIUM 100 MG/1
100 CAPSULE, LIQUID FILLED ORAL 2 TIMES DAILY PRN
Qty: 60 CAPSULE | Refills: 12 | Status: SHIPPED | OUTPATIENT
Start: 2023-03-22

## 2023-03-22 RX ORDER — AMLODIPINE BESYLATE 5 MG/1
5 TABLET ORAL DAILY
Qty: 30 TABLET | Refills: 3 | Status: SHIPPED | OUTPATIENT
Start: 2023-03-22 | End: 2023-04-21

## 2023-03-22 RX ORDER — BENZOYL PEROXIDE 100 MG/ML
LIQUID TOPICAL
Qty: 237 G | Refills: 0 | Status: SHIPPED | OUTPATIENT
Start: 2023-03-22

## 2023-03-22 RX ORDER — FLUTICASONE PROPIONATE 50 MCG
2 SPRAY, SUSPENSION (ML) NASAL DAILY PRN
Qty: 16 G | Refills: 5 | Status: SHIPPED | OUTPATIENT
Start: 2023-03-22

## 2023-03-22 RX ORDER — IBUPROFEN 600 MG/1
600 TABLET ORAL EVERY 6 HOURS PRN
Qty: 30 TABLET | Refills: 0 | Status: SHIPPED | OUTPATIENT
Start: 2023-03-22 | End: 2023-04-21

## 2023-03-22 RX ORDER — BACITRACIN, NEOMYCIN, POLYMYXIN B 400; 3.5; 5 [USP'U]/G; MG/G; [USP'U]/G
OINTMENT TOPICAL
Qty: 15 G | Refills: 5 | Status: SHIPPED | OUTPATIENT
Start: 2023-03-22

## 2023-03-22 RX ORDER — SODIUM FLUORIDE 6 MG/ML
PASTE, DENTIFRICE DENTAL
Qty: 100 ML | Refills: 0 | Status: SHIPPED | OUTPATIENT
Start: 2023-03-22

## 2023-03-22 RX ORDER — CETIRIZINE HYDROCHLORIDE 5 MG/1
5 TABLET, CHEWABLE ORAL
Qty: 30 TABLET | Refills: 5 | Status: SHIPPED | OUTPATIENT
Start: 2023-03-22

## 2023-03-22 RX ORDER — ACETAMINOPHEN 325 MG/1
325 TABLET ORAL EVERY 4 HOURS PRN
Qty: 30 TABLET | Refills: 5 | Status: SHIPPED | OUTPATIENT
Start: 2023-03-22

## 2023-03-22 RX ORDER — GABAPENTIN 800 MG/1
TABLET ORAL
Qty: 90 TABLET | Refills: 5 | Status: SHIPPED | OUTPATIENT
Start: 2023-03-22

## 2023-03-22 RX ORDER — L-DESOXYEPHEDRINE 50 MG
INHALER (EA) NASAL 2 TIMES DAILY PRN
Qty: 50 G | Refills: 5 | Status: SHIPPED | OUTPATIENT
Start: 2023-03-22

## 2023-03-22 RX ORDER — LEVOMEFOLATE/ALGAL OIL 15-90.314
15 CAPSULE ORAL DAILY
Qty: 30 CAPSULE | Refills: 3 | Status: SHIPPED | OUTPATIENT
Start: 2023-03-22 | End: 2023-04-21

## 2023-03-22 RX ORDER — SULFACETAMIDE SODIUM 100 MG/ML
LOTION TOPICAL DAILY
Qty: 118 ML | Refills: 0 | Status: SHIPPED | OUTPATIENT
Start: 2023-03-22

## 2023-03-22 RX ORDER — GUAIFENESIN/DEXTROMETHORPHAN 100-10MG/5
5 SYRUP ORAL 3 TIMES DAILY PRN
Qty: 118 ML | Refills: 5 | Status: SHIPPED | OUTPATIENT
Start: 2023-03-22

## 2023-03-22 RX ORDER — GUAIFENESIN 200 MG/10ML
200 LIQUID ORAL 3 TIMES DAILY PRN
Qty: 120 ML | Refills: 5 | Status: SHIPPED | OUTPATIENT
Start: 2023-03-22 | End: 2023-03-27 | Stop reason: SDUPTHER

## 2023-03-22 RX ORDER — OMEPRAZOLE 20 MG/1
20 CAPSULE, DELAYED RELEASE ORAL DAILY
Qty: 30 CAPSULE | Refills: 3 | Status: SHIPPED | OUTPATIENT
Start: 2023-03-22 | End: 2023-04-21

## 2023-03-22 RX ORDER — AMMONIUM LACTATE 12 G/100G
LOTION TOPICAL
Qty: 225 G | Refills: 0 | Status: SHIPPED | OUTPATIENT
Start: 2023-03-22

## 2023-03-22 NOTE — TELEPHONE ENCOUNTER
Folder (To be completed by) -Dr Fede Gibbs      Name of Form - Medical case notes     Color folder (    Form to be handed to caregiver     Patient was made aware of the 7-10 business day form policy

## 2023-03-24 ENCOUNTER — TELEPHONE (OUTPATIENT)
Dept: FAMILY MEDICINE CLINIC | Facility: CLINIC | Age: 38
End: 2023-03-24

## 2023-03-24 NOTE — TELEPHONE ENCOUNTER
Pharmacy calling, patient was prescribed Robitussin plain and Robitussin DM as standing orders  Previusly, his order was only for the DM  Are you able to send d/c order for the plain? No

## 2023-04-04 DIAGNOSIS — K05.10 GINGIVITIS: Primary | ICD-10-CM

## 2023-04-04 RX ORDER — EUCALYP/ME-SALICYLATE/MEN/THYM
10 MOUTHWASH MUCOUS MEMBRANE DAILY
Qty: 250 ML | Refills: 3 | Status: SHIPPED | OUTPATIENT
Start: 2023-04-04

## 2023-05-12 NOTE — ASSESSMENT & PLAN NOTE
8/25 CT AP: Diffuse mild-moderate colonic wall thickening with scattered air-fluid levels throughout suggesting colitis   Several mildly prominent nonspecific mesenteric lymph nodes, particularly in the right lower quadrant which are presumably reactive, possibly related to colitis  Given Ancef in the ED  As episodes of diarrhea are not frequent will not add stool studies or C diff at this time  Will continue Flagyl and begin ceftriaxone starting tomorrow  Can resume diet as tolerated
Acute onset  - Heme occult positive, Hgb of 10 4 today 08/28  - Will continue to monitor Hgb/Hct daily and for signs/symptoms of worsening bleed  - GI following
Acute onset  - Heme occult positive, Hgb stable on CBC  - Consulted GI, awaiting recs
Continue PTA Ativan
Continue PTA Colace
Continue PTA Depakote, lithium, and Zyprexa
Continue PTA amlodipine
Continue Protonix while inpatient, home regimen is omeprazole
Hold PTA Colace in the setting of diarrhea  - Restart on discharge if diarrhea has resolved
Improving, no acute symptoms  - Initially had diarrhea after admission with [facility]  - Patient had no diarrhea OVN, no BM since 08/27 in the morning; on exam, patient reports need to have BM  - Continue Flagyl & Ceftriaxone  - C-diff, stool culture & fecal leukocytes ordered, pending BM  - Most likely infectious enterocolitis   - GI recs: continuing abx at this time, monitoring for worsening of status, monitor Hgb and Hct, potentially with scope depending on if clinical deterioration
OVN, pt had 2 episodes of liquid blood per rectum  - see rectal bleeding plan  - Continue Flagyl & Ceftriaxone  - c-diff, stool culture & fecal leukocytes pending  - Consulted GI, awaiting recs
Status uncertain as patient failed to show stool to nurse r caregivers before flushing  - Will continue Flagyl and give another dose of ceftriaxone  - Continue regular diet  - F/u BMP  - Will monitor overnight with proper observation of stools
Vitals:    08/28/21 0823   BP: 118/81   Pulse:    Resp:    Temp: 97 8 °F (36 6 °C)   SpO2:      BP at goal  - Continue PTA amlodipine
no

## 2023-05-15 DIAGNOSIS — L70.0 ACNE VULGARIS: ICD-10-CM

## 2023-05-15 RX ORDER — SULFACETAMIDE SODIUM 100 MG/ML
LOTION TOPICAL DAILY
Qty: 118 ML | Refills: 5 | Status: SHIPPED | OUTPATIENT
Start: 2023-05-15

## 2023-06-16 ENCOUNTER — TELEPHONE (OUTPATIENT)
Dept: FAMILY MEDICINE CLINIC | Facility: CLINIC | Age: 38
End: 2023-06-16

## 2023-06-16 DIAGNOSIS — R13.10: Primary | ICD-10-CM

## 2023-06-16 DIAGNOSIS — K21.9 GASTROESOPHAGEAL REFLUX DISEASE WITHOUT ESOPHAGITIS: ICD-10-CM

## 2023-06-16 NOTE — TELEPHONE ENCOUNTER
Deepthi Baptiste from person direct is requesting an referral for speech and swallow eval  Referral can be faxed to [phone]

## 2023-07-03 ENCOUNTER — HOSPITAL ENCOUNTER (OUTPATIENT)
Dept: RADIOLOGY | Facility: HOSPITAL | Age: 38
Discharge: HOME | End: 2023-07-03
Payer: COMMERCIAL

## 2023-07-03 DIAGNOSIS — K21.9 GASTROESOPHAGEAL REFLUX DISEASE WITHOUT ESOPHAGITIS: ICD-10-CM

## 2023-07-03 DIAGNOSIS — R13.10: ICD-10-CM

## 2023-07-03 PROCEDURE — 74230 X-RAY XM SWLNG FUNCJ C+: CPT

## 2023-07-03 PROCEDURE — 92611 MOTION FLUOROSCOPY/SWALLOW: CPT

## 2023-07-03 NOTE — PROCEDURES
Video Swallow Study      Patient Name: Oscar REAVES'S Date: 7/3/2023        Past Medical History  Past Medical History:   Diagnosis Date   • ADHD (attention deficit hyperactivity disorder)    • Atypical pervasive developmental disorder    • Autism    • Bipolar disorder ([address])    • Constipation    • Depression    • Head-banging     Last Assessed:  9/1/17   • Hydronephrosis 9/19/2019   • Hyperlipidemia    • Hypertension    • Intermittent explosive disorder    • Oppositional defiant disorder    • Personality disorder ([address])    • Schizophrenia ([address])    • 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[address])    • Seizures ([address])    • Sleep disorder    • Vitamin D insufficiency     Last Assessed:  6/22/17   GERD     Past Surgical History  Past Surgical History:   Procedure Laterality Date   • NO PAST SURGERIES     • UMBILICAL HERNIA REPAIR         Modified (Video) Barium Swallow Study    Summary:  Images are on PACS for review. Oral stage: Mild. Abrupt transfer w/ liquids and purees w/ mildly reduced bolus control. Spill over the epiglottis when pt had his head down/was leaning forward. This improved when he was seated fully upright. Pt preferred chewing on his left due to reported pain on the R. (CG stated he recently had a tooth pulled on the R). Mastication was adequate. Bolus formation minimally reduced. Minimal/inconsistent lingual residue. Pharyngeal stage:Mild. Prompt swallows. Hyoid excursion, pharyngeal constriction, and velar closure were WNL. No CP prominence. Minimally reduced tongue base retraction. Intermittent minimal vallecular retention. Transient penetration and trace epiglottic undercoat w/ puree when pt was leaning over/head down w/ puree. Much improved when seated upright. Trace aspiration due to spill over the epiglottis w/ straw thin in upright position. No cough. When pt was cued to cough this elicited initial coughing.   Pt did not like the thin liquid and only took one more very small straw sip, 2 very small cup sips. Improvement noted w/ cup sip, however sip size was also much smaller. No transient penetration/penetration/aspiration w/ nectar thick by straw. Per gross esophageal screen: Trace residual. Pill/puree cleared WNL. Strategies: Upright posture reduced spill over the epiglottis. Do NOT recommend chin down or leaning forward. Recommendations:  Diet:Dysphagia Level 3 dental soft/chopped. (Quarter sized/soft if that is the diet label used)  Liquids: thin as tolerated. Trial by small cup sips if pt can follow cues. May need to downgrade to nectar thick. Meds: whole in puree  Strategies: slow rate, chew well, small bites, swallow after each bite, head at midline. Frequent oral care  Upright position  F/u ST tx: Yes. Recommend ST f/u in his environment. CloseSupervision  Aspiration Precautions  Reflux Precautions  Results reviewed with: pt, both CGs  Repeat MBS as necessary  If a dedicated assessment of the esophagus is desired:  Consider EGD      Functional Oral Intake Scale (FOIS)  Efren Garg PhD, F-CAMELIA  (Does not include liquids)  5. Total oral diet with multiple consistencies but requiring special preparation or compensations. Pt is a 38yom referred by PCP for MBS. CGs accompanying pt reported he coughs during meals, and also has occasional cough when not eating. Referral from dentist to [facility] 4/13/23. CGs reported pt had teeth pulled, though there are still some teeth remaining. Pt reported he had to chew on the left "this side" because he had pain on the R side of his mouth. Informed CGs. H&P/pertinent provider notes: (PMH noted above)  HPI   Cassidy Whitaker is a 47yo M with a PMH of HTN, GERD, Schizophrenia, bipolar disorder, intermittent explosive disorder here today for a 3mo follow up.  He is here today with caregivers, Tolu Romeo and True Harrison report he is doing well and they have no complaints at this time. Delmer continues to follow with psychiatry who manages his behavioral medications. He states that he has been feeling overall well. He has not been depressed. He is currently excited about a new poster that is coming in the mail for him soon. Previous MBS:  None    Seen by OP St 12/4/18:  Diet Texture Recommendations  Solids: Mechanical Soft  Liquids: Thin  Pills:Whole with liquids and Other: continue with taking pills whole with thin liquids as tolerated  ReferralsNone at this time. Impressions/ Recommendations  Impressions: Pt presents with mild oral dysphagia c/b premature spillage, pocketing and stasis and insufficient mastication of hard solids secondary to tooth sensitivity and pain due to lack of dentition. Rx mechanical soft solid diet with regular liquids at this time using strategies of small bites and sips while eating at a slow rate. Pt's caregivers have also been instructed to cue patient to swallow between bites and use a liquid wash if necessary to clear any of the bolus that has been pocketed. Caregivers have been instructed to monitor patient closely on the mechanical soft diet at this time and if pt demonstrates any s/s of aspiration, to call clinic for follow up. Recommendations:       Patient would benefit from: Other follow through with diet rx in group home       Frequency:No treatment warranted at this time       Duration:Other no treatment warranted at this time      Food allergies:  None   Current diet: Chopped, except for hamburgers. Thin liquids. Premorbid diet:  ? Has been fluctuating depending on dental status   Dentition:  minimal dentition   O2 requirement:  none   Oral mech:  wfl   Vocal quality/speech:  At least moderate dysarthria   Cognitive status: alert and cooperative (other than refusing additional thin liquid)       Consistencies administered: Puree, nutrigrain bar, granola bar, hard cookie, turkey sandwich bite,  barium pill w/ puree, thin (only minimal very small trials after initial sip due to dislike), nectar. Pt was viewed seated laterally at 90 degrees.   Unable to view  AP Alert-The patient is alert, awake and responds to voice. The patient is oriented to time, place, and person. The triage nurse is able to obtain subjective information.

## 2023-07-10 ENCOUNTER — HOSPITAL ENCOUNTER (EMERGENCY)
Facility: HOSPITAL | Age: 38
Discharge: HOME | End: 2023-07-10
Admitting: EMERGENCY MEDICINE
Payer: COMMERCIAL

## 2023-07-10 ENCOUNTER — APPOINTMENT (EMERGENCY)
Dept: CT IMAGING | Facility: HOSPITAL | Age: 38
End: 2023-07-10
Payer: COMMERCIAL

## 2023-07-10 VITALS
BODY MASS INDEX: 35.07 KG/M2 | OXYGEN SATURATION: 97 % | DIASTOLIC BLOOD PRESSURE: 91 MMHG | SYSTOLIC BLOOD PRESSURE: 132 MMHG | RESPIRATION RATE: 16 BRPM | HEART RATE: 114 BPM | TEMPERATURE: 98.2 F | WEIGHT: 240.96 LBS

## 2023-07-10 DIAGNOSIS — S09.90XA INJURY OF HEAD, INITIAL ENCOUNTER: Primary | ICD-10-CM

## 2023-07-10 DIAGNOSIS — S00.81XA ABRASION OF FOREHEAD, INITIAL ENCOUNTER: ICD-10-CM

## 2023-07-10 PROCEDURE — 99284 EMERGENCY DEPT VISIT MOD MDM: CPT

## 2023-07-10 PROCEDURE — 70450 CT HEAD/BRAIN W/O DYE: CPT

## 2023-07-10 PROCEDURE — G1004 CDSM NDSC: HCPCS

## 2023-07-10 RX ORDER — OLANZAPINE 5 MG/1
10 TABLET ORAL ONCE
Status: COMPLETED | OUTPATIENT
Start: 2023-07-10 | End: 2023-07-10

## 2023-07-10 RX ORDER — GABAPENTIN 400 MG/1
800 CAPSULE ORAL ONCE
Status: COMPLETED | OUTPATIENT
Start: 2023-07-10 | End: 2023-07-10

## 2023-07-10 RX ORDER — LORAZEPAM 1 MG/1
1 TABLET ORAL ONCE
Status: COMPLETED | OUTPATIENT
Start: 2023-07-10 | End: 2023-07-10

## 2023-07-10 RX ADMIN — GABAPENTIN 800 MG: 400 CAPSULE ORAL at 20:30

## 2023-07-10 RX ADMIN — LORAZEPAM 1 MG: 1 TABLET ORAL at 20:30

## 2023-07-10 RX ADMIN — OLANZAPINE 10 MG: 5 TABLET, FILM COATED ORAL at 20:30

## 2023-07-11 NOTE — DISCHARGE INSTRUCTIONS
Please refer to the attached information for strict return instructions. If symptoms worsen or new symptoms develop please return to the ER. Keep wounds to forehead clean and keep covered until healed. ,

## 2023-07-11 NOTE — ED PROVIDER NOTES
History  Chief Complaint   Patient presents with   • Head Injury     Pt coming via EMS from group [facility]. Per EMS, patient got angry tonight and hit his head 3 times on a brick wall. Abrasions noted to middle of forehead. Pt calm and cooperative at this time. Augusta Solano is a 40 yo M presenting from group home with caregivers after the patient reportedly became upset and struck his forehead hard numerous times on the corner of a brick wall. He suffered several abrasions to forehead and front of scalp which the caregivers cleaned/dressed prior to arrival. He has since calmed down and is returned to his baseline. The patient offers no complaints except for localized pain to forehead. No vomiting or behavioral changes noted by staff. No thinners/aspirin. History provided by:  Patient   used: No        Prior to Admission Medications   Prescriptions Last Dose Informant Patient Reported? Taking? Camphor-Eucalyptus-Menthol (Vicks VapoRub) 4.7-1.2-2.6 % OINT   No No   Sig: Apply topically 2 (two) times a day as needed (PRN)   L-Methylfolate-Algae (Deplin 15) 15-90.314 MG CAPS   No No   Sig: Take 15 mg by mouth daily Take 1 tablet by mouth once daily at 8AM   LORazepam (ATIVAN) 1 mg tablet  Care Giver Yes No   Sig: Take 1 mg by mouth 2 (two) times a day    Mouthwashes (Listerine Antiseptic) LIQD   No No   Sig: Apply 10 mL to the mouth or throat daily   OLANZapine (ZyPREXA) 20 MG tablet  Care Giver No No   Sig: Take 0.5 tablet (10 mg) by mouth twice daily at 12 pm and 1.5 tab(15mg)  8 pm   Patient taking differently: Take 0.5 tablet (10 mg) by mouth twice daily at 12 pm and (10mg)  8 pm   SODIUM FLUORIDE, DENTAL RINSE, (Listerine Smart Rinse) 0.02 % SOLN   No No   Sig: Swish and spit one capful in mouth before bed daily.    Sodium Fluoride (PreviDent 5000 Booster Plus) 1.1 % PSTE   No No   Sig: Pea sized amount to brush at bedtime   Sulfacetamide Sodium, Acne, 10 % LOTN   No No   Sig: Apply topically in the morning   Sunscreens (Sunblock SPF30) LOTN   No No   Sig: Apply every 2 hours up to five times daily   acetaminophen (TYLENOL) 325 mg tablet   No No   Sig: Take 1 tablet (325 mg total) by mouth every 4 (four) hours as needed for mild pain or fever   amLODIPine (NORVASC) 5 mg tablet   No No   Sig: Take 1 tablet (5 mg total) by mouth daily   ammonium lactate (LAC-HYDRIN) 12 % lotion   No No   Sig: APPLY TOP. TO AFF. AREA ON SKIN TWICE DAILY AS NEEDED (ECZEMA) *BARRIENTOS   benzoyl peroxide 10 % LIQD   No No   Sig: USE TO WASH FACE/ SHOULDERS/BACK As needed  (ACNE)*STOP USING IF EXCESSIVE IRRIT/RED   bismuth subsalicylate (PEPTO BISMOL) 524 mg/30 mL oral suspension   No No   Sig: Take 15 mL (262 mg total) by mouth every 6 (six) hours as needed for indigestion or diarrhea   carbamide peroxide (DEBROX) 6.5 % otic solution   No No   Sig: Administer 5 drops into both ears 2 (two) times a day for 4 days   cetirizine (ZyrTEC) 5 MG chewable tablet   No No   Sig: Chew 1 tablet (5 mg total) daily at bedtime as needed for allergies   dextromethorphan-guaiFENesin (ROBITUSSIN DM)  mg/5 mL syrup   No No   Sig: Take 5 mL by mouth 3 (three) times a day as needed for cough or congestion   divalproex sodium (DEPAKOTE ER) 500 mg 24 hr tablet  Care Giver No No   Sig: Take 3 tablets (1500 mg total) by mouth once daily at 5 pm   docusate sodium (COLACE) 100 mg capsule   No No   Sig: Take 1 capsule (100 mg total) by mouth 2 (two) times a day as needed for constipation   fluticasone (FLONASE) 50 mcg/act nasal spray   No No   Si sprays into each nostril daily as needed for rhinitis Please take 2 spray, one in each nostril, daily as needed for runny nose and nasal congestion.    gabapentin (NEURONTIN) 800 mg tablet   No No   Sig: Take 1 tablet (800 mg total) by mouth 3 times daily at 8 am, 4 pm, and 8 pm   ibuprofen (MOTRIN) 600 mg tablet   No No   Sig: Take 1 tablet (600 mg total) by mouth every 6 (six) hours as needed for mild pain   lanolin-mineral oil (BABY OIL) OIL   No No   Sig: Apply topically if needed for dry skin Use as needed for dry scalp   lithium carbonate (LITHOBID) 450 mg CR tablet  Care Giver No No   Sig: Take 2 tablets (900 mg total) by mouth daily at 5:30 pm   neomycin-bacitracin-polymyxin b (NEOSPORIN) ointment   No No   Sig: Apply BID PRN for wound care   omeprazole (PriLOSEC) 20 mg delayed release capsule   No No   Sig: Take 1 capsule (20 mg total) by mouth daily   phenol (CHLORASEPTIC) 1.4 % mucosal liquid   No No   Sig: Apply 1 spray to the mouth or throat every 2 (two) hours as needed (sore throat)   pyrithione zinc (HEAD AND SHOULDERS) 1 % shampoo   No No   Sig: Apply topically daily as needed for dandruff   pyrithione zinc (HEAD AND SHOULDERS) 1 % shampoo   No No   Sig: Apply topically daily as needed for dandruff      Facility-Administered Medications Last Administration Doses Remaining   neomycin-bacitracin-polymyxin (NEOSPORIN) ointment None recorded           Past Medical History:   Diagnosis Date   • ADHD (attention deficit hyperactivity disorder)    • Atypical pervasive developmental disorder    • Autism    • Bipolar disorder ([address])    • Constipation    • Depression    • Head-banging     Last Assessed:  9/1/17   • Hydronephrosis 9/19/2019   • Hyperlipidemia    • Hypertension    • Intermittent explosive disorder    • Oppositional defiant disorder    • Personality disorder ([address])    • Schizophrenia ([address])    • 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[address])    • Seizures ([address])    • Sleep disorder    • Vitamin D insufficiency     Last Assessed:  6/22/17       Past Surgical History:   Procedure Laterality Date   • NO PAST SURGERIES     • UMBILICAL HERNIA REPAIR         History reviewed. No pertinent family history. I have reviewed and agree with the history as documented.     E-Cigarette/Vaping   • E-Cigarette Use Never User      E-Cigarette/Vaping Substances   • Nicotine No    • THC No    • CBD No    • Flavoring No    • Other No    • Unknown No      Social History     Tobacco Use   • Smoking status: Never   • Smokeless tobacco: Never   Vaping Use   • Vaping Use: Never used   Substance Use Topics   • Alcohol use: No   • Drug use: No       Review of Systems   Unable to perform ROS: Other   Constitutional: Negative for activity change. HENT: Negative for rhinorrhea. Eyes: Negative for redness and visual disturbance. Respiratory: Negative for cough. Gastrointestinal: Negative for vomiting. Musculoskeletal: Negative for neck pain. Skin: Positive for wound. Neurological: Positive for headaches. Physical Exam  Physical Exam  Constitutional:       General: He is not in acute distress. Appearance: He is well-developed. He is not diaphoretic. HENT:      Head: Normocephalic and atraumatic. No raccoon eyes or Pack's sign. Jaw: There is normal jaw occlusion. No trismus or tenderness. Right Ear: External ear normal. No hemotympanum. Left Ear: External ear normal. No hemotympanum. Nose: No nasal tenderness. Right Nostril: No septal hematoma. Left Nostril: No septal hematoma. Mouth/Throat:      Dentition: No dental tenderness. Eyes:      Conjunctiva/sclera: Conjunctivae normal.      Pupils: Pupils are equal, round, and reactive to light. Cardiovascular:      Rate and Rhythm: Normal rate and regular rhythm. Heart sounds: Normal heart sounds. No murmur heard. No friction rub. No gallop. Pulmonary:      Effort: Pulmonary effort is normal. No respiratory distress. Breath sounds: Normal breath sounds. No wheezing. Abdominal:      General: There is no distension. Palpations: Abdomen is soft. Tenderness: There is no abdominal tenderness. There is no right CVA tenderness, left CVA tenderness or guarding. Musculoskeletal:      Cervical back: Normal range of motion and neck supple.       Comments: No midline C spine TTP noted on exam   Lymphadenopathy: Cervical: No cervical adenopathy. Skin:     General: Skin is warm and dry. Capillary Refill: Capillary refill takes less than 2 seconds. Findings: No erythema or rash. Neurological:      Mental Status: He is alert. Motor: No abnormal muscle tone. Coordination: Coordination normal.      Comments: Alert, oriented per baseline         Vital Signs  ED Triage Vitals [07/10/23 1914]   Temperature Pulse Respirations Blood Pressure SpO2   98.2 °F (36.8 °C) (!) 114 16 132/91 97 %      Temp Source Heart Rate Source Patient Position - Orthostatic VS BP Location FiO2 (%)   Oral Monitor Sitting Right arm --      Pain Score       --           Vitals:    07/10/23 1914   BP: 132/91   Pulse: (!) 114   Patient Position - Orthostatic VS: Sitting         Visual Acuity      ED Medications  Medications   LORazepam (ATIVAN) tablet 1 mg (1 mg Oral Given 7/10/23 2030)   OLANZapine (ZyPREXA) tablet 10 mg (10 mg Oral Given 7/10/23 2030)   gabapentin (NEURONTIN) capsule 800 mg (800 mg Oral Given 7/10/23 2030)       Diagnostic Studies  Results Reviewed     None                 CT head without contrast   Final Result by Linda Saleh MD (07/10 2224)      No acute intracranial abnormality. Workstation performed: [id number]                    Procedures  Procedures         ED Course                                             Medical Decision Making  Pt presenting from group home after he became upset and struck forehead numerous times against brick wall. Abrasions to forehead, no active bleeding. Wounds cleansed, dressed with nonstick gauze/gauze roll here. He is reportedly at baseline per facility. Noted to have forehead hematoma. Given history will check CT head to exclude ICH, less likely frontal bone/skull fx which is without acute abnormality. Given scheduled medications here per accompanying papers. Stable for discharge, wound care instructions reviewed with caregivers.     Amount and/or Complexity of Data Reviewed  Radiology: ordered. Risk  Prescription drug management. Disposition  Final diagnoses:   Injury of head, initial encounter   Abrasion of forehead, initial encounter     Time reflects when diagnosis was documented in both MDM as applicable and the Disposition within this note     Time User Action Codes Description Comment    7/10/2023 10:36 PM Cherie Lopez Add [S09.90XA] Injury of head, initial encounter     7/10/2023 10:36 PM Cherie Lopez Add [S00.81XA] Abrasion of forehead, initial encounter       ED Disposition     ED Disposition   Discharge    Condition   Stable    Date/Time   Mon Jul 10, 2023 10:36 PM    [facility] discharge to home/self care. Follow-up Information     Follow up With Specialties Details Why Contact Info Additional [facility] Emergency Department Emergency Medicine  If symptoms worsen [address] [id number]  [address] [facility] Emergency Department, [address] [place] [address], [facility]          Patient's Medications   Discharge Prescriptions    No medications on file       No discharge procedures on file.     PDMP Review     None          ED Provider  Electronically Signed by           Rico Anaya PA-C  07/10/23 6318

## 2023-07-13 ENCOUNTER — OFFICE VISIT (OUTPATIENT)
Dept: DENTISTRY | Facility: CLINIC | Age: 38
End: 2023-07-13

## 2023-07-13 VITALS — HEART RATE: 87 BPM | SYSTOLIC BLOOD PRESSURE: 138 MMHG | DIASTOLIC BLOOD PRESSURE: 87 MMHG | TEMPERATURE: 100.7 F

## 2023-07-13 DIAGNOSIS — Z01.20 ENCOUNTER FOR DENTAL EXAMINATION: Primary | ICD-10-CM

## 2023-07-13 DIAGNOSIS — K03.6 ACCRETIONS ON TEETH: ICD-10-CM

## 2023-07-13 DIAGNOSIS — K08.9 POOR DENTITION: ICD-10-CM

## 2023-07-13 PROCEDURE — D0120 PERIODIC ORAL EVALUATION - ESTABLISHED PATIENT: HCPCS | Performed by: DENTIST

## 2023-07-13 PROCEDURE — D1110 PROPHYLAXIS - ADULT: HCPCS

## 2023-07-13 PROCEDURE — D1206 TOPICAL APPLICATION OF FLUORIDE VARNISH: HCPCS

## 2023-07-13 RX ORDER — SODIUM FLUORIDE 6 MG/ML
PASTE, DENTIFRICE DENTAL
Qty: 100 ML | Refills: 0 | Status: SHIPPED | OUTPATIENT
Start: 2023-07-13 | End: 2023-07-21 | Stop reason: SDUPTHER

## 2023-07-13 NOTE — DENTAL PROCEDURE DETAILS
See exam note    Prophylaxis completed with hand instrumentation. Soft plaque removed and supragingival calculus removed  Polished with prophy cup and paste. Flossed and provided Oral Health Instructions. Demonstrated proper brushing and flossing technique. Patient left satisfied and ambulatory.   Gave RX for Prevident and OTC Fl2 rinse    Applied Fl2 varnish to teeth   gen decalcification noted  6 MRC

## 2023-07-13 NOTE — DENTAL PROCEDURE DETAILS
Bhavna Mathews presents for a Periodic exam. Verbal consent for treatment given in addition to the forms. Reviewed health history - Patient is ASA II  Consents signed: Yes     Pain Scale: 0  Caries Assessment: High  Radiographs: None    EXAM  DR OROZCO     Oral Hygiene instruction reviewed and given. Recommended Hygiene recall visits with   Meryle Sands. Treatment Plan:  1. Infection control:    2. Periodontal therapy: adult prophy/   3. Caries control: as charted  4. Occlusal evaluation:     Extraction site # 30, 29 healing well    patient is able to eat with limited dentition     Prognosis is Good. Referrals needed: No  Next Visit:    6 mos recall   possibly attempt pan?  Or intra oral x rays    RX given for Prevident  RX given to continue with Listerine Fl2 rinse or ACT

## 2023-07-21 ENCOUNTER — TELEPHONE (OUTPATIENT)
Dept: FAMILY MEDICINE CLINIC | Facility: CLINIC | Age: 38
End: 2023-07-21

## 2023-07-21 ENCOUNTER — OFFICE VISIT (OUTPATIENT)
Dept: FAMILY MEDICINE CLINIC | Facility: CLINIC | Age: 38
End: 2023-07-21

## 2023-07-21 VITALS
TEMPERATURE: 99 F | HEART RATE: 86 BPM | HEIGHT: 69 IN | BODY MASS INDEX: 34.63 KG/M2 | SYSTOLIC BLOOD PRESSURE: 136 MMHG | WEIGHT: 233.8 LBS | DIASTOLIC BLOOD PRESSURE: 81 MMHG

## 2023-07-21 DIAGNOSIS — L70.0 ACNE VULGARIS: ICD-10-CM

## 2023-07-21 DIAGNOSIS — Z11.59 ENCOUNTER FOR HEPATITIS C SCREENING TEST FOR LOW RISK PATIENT: ICD-10-CM

## 2023-07-21 DIAGNOSIS — K21.9 GASTROESOPHAGEAL REFLUX DISEASE: ICD-10-CM

## 2023-07-21 DIAGNOSIS — J02.9 SORE THROAT: ICD-10-CM

## 2023-07-21 DIAGNOSIS — H10.13 ALLERGIC CONJUNCTIVITIS OF BOTH EYES AND RHINITIS: ICD-10-CM

## 2023-07-21 DIAGNOSIS — R09.81 MILD NASAL CONGESTION: ICD-10-CM

## 2023-07-21 DIAGNOSIS — T14.8XXA ABRASION: ICD-10-CM

## 2023-07-21 DIAGNOSIS — L21.0 DANDRUFF: ICD-10-CM

## 2023-07-21 DIAGNOSIS — K59.00 CONSTIPATION, UNSPECIFIED CONSTIPATION TYPE: ICD-10-CM

## 2023-07-21 DIAGNOSIS — Z13.29 SCREENING FOR HYPOTHYROIDISM: ICD-10-CM

## 2023-07-21 DIAGNOSIS — L30.9 DERMATITIS: ICD-10-CM

## 2023-07-21 DIAGNOSIS — R13.10: Primary | ICD-10-CM

## 2023-07-21 DIAGNOSIS — J30.9 ALLERGIC CONJUNCTIVITIS OF BOTH EYES AND RHINITIS: ICD-10-CM

## 2023-07-21 DIAGNOSIS — Z00.00 ANNUAL PHYSICAL EXAM: Primary | ICD-10-CM

## 2023-07-21 DIAGNOSIS — R13.10: ICD-10-CM
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
## 2023-07-21 DIAGNOSIS — F98.4 HEAD-BANGING: ICD-10-CM

## 2023-07-21 DIAGNOSIS — Z11.4 SCREENING FOR HIV (HUMAN IMMUNODEFICIENCY VIRUS): ICD-10-CM

## 2023-07-21 DIAGNOSIS — I10 ESSENTIAL HYPERTENSION: ICD-10-CM

## 2023-07-21 DIAGNOSIS — E78.5 HYPERLIPIDEMIA, UNSPECIFIED HYPERLIPIDEMIA TYPE: ICD-10-CM

## 2023-07-21 DIAGNOSIS — K08.9 POOR DENTITION: ICD-10-CM

## 2023-07-21 RX ORDER — GUAIFENESIN/DEXTROMETHORPHAN 100-10MG/5
5 SYRUP ORAL 3 TIMES DAILY PRN
Qty: 118 ML | Refills: 5 | Status: SHIPPED | OUTPATIENT
Start: 2023-07-21

## 2023-07-21 RX ORDER — FLUTICASONE PROPIONATE 50 MCG
2 SPRAY, SUSPENSION (ML) NASAL DAILY PRN
Qty: 16 G | Refills: 5 | Status: SHIPPED | OUTPATIENT
Start: 2023-07-21

## 2023-07-21 RX ORDER — ACETAMINOPHEN 325 MG/1
325 TABLET ORAL EVERY 4 HOURS PRN
Qty: 30 TABLET | Refills: 5 | Status: SHIPPED | OUTPATIENT
Start: 2023-07-21

## 2023-07-21 RX ORDER — LEVOMEFOLATE/ALGAL OIL 15-90.314
15 CAPSULE ORAL DAILY
Qty: 30 CAPSULE | Refills: 3 | Status: SHIPPED | OUTPATIENT
Start: 2023-07-21 | End: 2023-08-20

## 2023-07-21 RX ORDER — IBUPROFEN 600 MG/1
600 TABLET ORAL EVERY 6 HOURS PRN
Qty: 30 TABLET | Refills: 0 | Status: SHIPPED | OUTPATIENT
Start: 2023-07-21 | End: 2023-08-20

## 2023-07-21 RX ORDER — AMLODIPINE BESYLATE 5 MG/1
5 TABLET ORAL DAILY
Qty: 30 TABLET | Refills: 3 | Status: SHIPPED | OUTPATIENT
Start: 2023-07-21 | End: 2023-08-20

## 2023-07-21 RX ORDER — BACITRACIN ZINC, NEOMYCIN, POLYMYXIN B 400; 3.5; 5 [USP'U]/G; MG/G; [USP'U]/G
3.5 OINTMENT TOPICAL 2 TIMES DAILY PRN
Qty: 28 G | Refills: 1 | Status: SHIPPED | OUTPATIENT
Start: 2023-07-21

## 2023-07-21 RX ORDER — DOCUSATE SODIUM 100 MG/1
100 CAPSULE, LIQUID FILLED ORAL 2 TIMES DAILY PRN
Qty: 60 CAPSULE | Refills: 12 | Status: SHIPPED | OUTPATIENT
Start: 2023-07-21

## 2023-07-21 RX ORDER — BENZOYL PEROXIDE 100 MG/ML
LIQUID TOPICAL
Qty: 237 G | Refills: 0 | Status: SHIPPED | OUTPATIENT
Start: 2023-07-21

## 2023-07-21 RX ORDER — L-DESOXYEPHEDRINE 50 MG
INHALER (EA) NASAL 2 TIMES DAILY PRN
Qty: 50 G | Refills: 5 | Status: SHIPPED | OUTPATIENT
Start: 2023-07-21

## 2023-07-21 RX ORDER — SULFACETAMIDE SODIUM 100 MG/ML
LOTION TOPICAL DAILY
Qty: 118 ML | Refills: 5 | Status: SHIPPED | OUTPATIENT
Start: 2023-07-21

## 2023-07-21 RX ORDER — SODIUM FLUORIDE 6 MG/ML
PASTE, DENTIFRICE DENTAL
Qty: 100 ML | Refills: 0 | Status: SHIPPED | OUTPATIENT
Start: 2023-07-21

## 2023-07-21 RX ORDER — CETIRIZINE HYDROCHLORIDE 5 MG/1
5 TABLET, CHEWABLE ORAL
Qty: 30 TABLET | Refills: 5 | Status: SHIPPED | OUTPATIENT
Start: 2023-07-21

## 2023-07-21 RX ORDER — AMMONIUM LACTATE 12 G/100G
LOTION TOPICAL
Qty: 225 G | Refills: 0 | Status: SHIPPED | OUTPATIENT
Start: 2023-07-21

## 2023-07-21 RX ORDER — OMEPRAZOLE 20 MG/1
20 CAPSULE, DELAYED RELEASE ORAL DAILY
Qty: 30 CAPSULE | Refills: 3 | Status: SHIPPED | OUTPATIENT
Start: 2023-07-21 | End: 2023-08-20

## 2023-07-21 NOTE — TELEPHONE ENCOUNTER
Folder (Yext) -     Name of Form - [facility] Medical Exam [place] & Individual Px Exam    Color folder (Yellow Team)    Form to be (By whom) -paperwork was taken after visit    Patient was made aware of the 7-10 business day form policy.     Scanned to Chart

## 2023-07-21 NOTE — ASSESSMENT & PLAN NOTE
Caretakers report supervision with each meal and mechanical soft diet, with food in small pieces. Patient has poor dentition with regular dental follow up.  Barium swallow (7/3/23) with recommendation for speech evaluation.  - At home speech therapy referral

## 2023-07-21 NOTE — ASSESSMENT & PLAN NOTE
41 y.o. male here for annual physical exam.     Screening: I have discussed each screening test below (per USPSTF guideline) with patient, to which patient expresses understanding and is agreeable to plan  - Obesity -- Positive   - Hypertension -- Positive   - Nicotine/EtOH/Drugs -- Negative   - Lipid screen - Ordered  Depression -- Negative   Depression: Not at risk (3/22/2023)    PHQ-2    • PHQ-2 Score: 0     - T2DM -- Not indicated.    Lab Results   Component Value Date    HGBA1C 4.7 10/09/2020     Lab Results   Component Value Date    GLUF 105 (H) 11/28/2022    LDLCALC 120 (H) 11/28/2022    CREATININE 1.01 11/28/2022     Colorectal cancer -- Not indicated   Lung cancer -- Not indicated   Prostate CA -- Not indicated

## 2023-07-21 NOTE — PROGRESS NOTES
[address] [facility] [facility]    NAME: Nakia Grimm  AGE: 41 y.o. SEX: male  : 1985     DATE: 2023     Assessment and Plan:     Problem List Items Addressed This Visit        Digestive    Poor dentition    Relevant Medications    SODIUM FLUORIDE, DENTAL RINSE, (Listerine Smart Rinse) 0.02 % SOLN    Sodium Fluoride (PreviDent 5000 Booster Plus) 1.1 % PSTE    Swallowing/mastication problem     Caretakers report supervision with each meal and mechanical soft diet, with food in small pieces. Patient has poor dentition with regular dental follow up.  Barium swallow (7/3/23) with recommendation for speech evaluation.  - At home speech therapy referral         Relevant Orders    Ambulatory Referral to Speech Therapy    Referral to [id number] [facility]    Vitamin D 25 hydroxy       Respiratory    Allergic conjunctivitis of both eyes and rhinitis    Relevant Medications    fluticasone (FLONASE) 50 mcg/act nasal spray    ibuprofen (MOTRIN) 600 mg tablet    cetirizine (ZyrTEC) 5 MG chewable tablet       Musculoskeletal and Integument    Acne vulgaris    Relevant Medications    L-Methylfolate-Algae (Deplin 15) 15-90.314 MG CAPS    Sulfacetamide Sodium, Acne, 10 % LOTN    ammonium lactate (LAC-HYDRIN) 12 % lotion    benzoyl peroxide 10 % LIQD    pyrithione zinc (HEAD AND SHOULDERS) 1 % shampoo    lanolin-mineral oil (BABY OIL) OIL    Neomycin-Bacitracin-Polymyxin (First Aid Antibiotic) 3.5-400-5000 MG-UNIT OINT    Sunscreens (Sunblock SPF30) LOTN       Other    Head-banging    Relevant Medications    ibuprofen (MOTRIN) 600 mg tablet    acetaminophen (TYLENOL) 325 mg tablet    Annual physical exam - Primary     41 y.o. male here for annual physical exam.     Screening: I have discussed each screening test below (per USPSTF guideline) with patient, to which patient expresses understanding and is agreeable to plan  - Obesity -- Positive   - Hypertension -- Positive   - Nicotine/EtOH/Drugs -- Negative   - Lipid screen - Ordered  Depression -- Negative   Depression: Not at risk (3/22/2023)    PHQ-2    • PHQ-2 Score: 0     - T2DM -- Not indicated.    Lab Results   Component Value Date    HGBA1C 4.7 10/09/2020     Lab Results   Component Value Date    GLUF 105 (H) 11/28/2022    LDLCALC 120 (H) 11/28/2022    CREATININE 1.01 11/28/2022     Colorectal cancer -- Not indicated   Lung cancer -- Not indicated   Prostate CA -- Not indicated            Hyperlipidemia     Last lipid panel (11/28/22) with LDL of 120.   - Ordered repeat lipid panel         Relevant Orders    Lipid panel    Comprehensive metabolic panel   Other Visit Diagnoses     Essential hypertension        Relevant Medications    amLODIPine (NORVASC) 5 mg tablet    Gastroesophageal reflux disease        Relevant Medications    omeprazole (PriLOSEC) 20 mg delayed release capsule    Other Relevant Orders    Comprehensive metabolic panel    CBC and Platelet    Dermatitis        Relevant Medications    Sulfacetamide Sodium, Acne, 10 % LOTN    ammonium lactate (LAC-HYDRIN) 12 % lotion    benzoyl peroxide 10 % LIQD    pyrithione zinc (HEAD AND SHOULDERS) 1 % shampoo    lanolin-mineral oil (BABY OIL) OIL    Neomycin-Bacitracin-Polymyxin (First Aid Antibiotic) 3.5-400-5000 MG-UNIT OINT    Sunscreens (Sunblock SPF30) LOTN    Dandruff        Relevant Medications    Sulfacetamide Sodium, Acne, 10 % LOTN    ammonium lactate (LAC-HYDRIN) 12 % lotion    benzoyl peroxide 10 % LIQD    pyrithione zinc (HEAD AND SHOULDERS) 1 % shampoo    lanolin-mineral oil (BABY OIL) OIL    Neomycin-Bacitracin-Polymyxin (First Aid Antibiotic) 3.5-400-5000 MG-UNIT OINT    Sunscreens (Sunblock SPF30) LOTN    Sore throat        Relevant Medications    phenol (CHLORASEPTIC) 1.4 % mucosal liquid    Constipation, unspecified constipation type        Relevant Medications    docusate sodium (COLACE) 100 mg capsule    Abrasion Relevant Medications    Neomycin-Bacitracin-Polymyxin (First Aid Antibiotic) 3.5-400-5000 MG-UNIT OINT    Mild nasal congestion        Relevant Medications    dextromethorphan-guaiFENesin (ROBITUSSIN DM)  mg/5 mL syrup    Camphor-Eucalyptus-Menthol (Vicks VapoRub) 4.7-1.2-2.6 % OINT    Mild sun exposure, subsequent encounter        Relevant Medications    Sunscreens (Sunblock SPF30) LOTN    Encounter for hepatitis C screening test for low risk patient        Relevant Orders    Hepatitis C antibody    Screening for HIV (human immunodeficiency virus)        Relevant Orders    HIV 1/2 AG/AB w Reflex [facility] for 2 yr old and above    Screening for hypothyroidism        Relevant Orders    TSH, 3rd generation with Free T4 reflex          Immunizations and preventive care screenings were discussed with patient today. Appropriate education was printed on patient's after visit summary. Counseling:  Dental Health: discussed importance of regular tooth brushing, flossing, and dental visits. Injury prevention: discussed safety/seat belts, safety helmets, smoke detectors, carbon dioxide detectors, and smoking near bedding or upholstery. · Exercise: the importance of regular exercise/physical activity was discussed. Recommend exercise 3-5 times per week for at least 30 minutes. BMI Counseling: Body mass index is 34.23 kg/m². The BMI is above normal. Nutrition recommendations include decreasing portion sizes, encouraging healthy choices of fruits and vegetables, decreasing fast food intake, consuming healthier snacks, limiting drinks that contain sugar, moderation in carbohydrate intake, increasing intake of lean protein, reducing intake of saturated and trans fat and reducing intake of cholesterol. Exercise recommendations include moderate physical activity 150 minutes/week. No pharmacotherapy was ordered. Rationale for BMI follow-up plan is due to patient being overweight or obese.          No follow-ups on file.     Chief Complaint:     Chief Complaint   Patient presents with   • Physical Exam   • Medication Refill      History of Present Illness:     Adult Annual Physical   Patient here for a comprehensive physical exam. The patient reports no problems. Patient is accompanied by caregivers. Discussed medication refills and need for at home speech evaluation as per recommendations from recent barium swallow. Diet and Physical Activity  · Diet/Nutrition: well balanced diet and consuming 3-5 servings of fruits/vegetables daily. · Exercise: moderate cardiovascular exercise. Patient enjoys playing basketball. Depression Screening  PHQ-2/9 Depression Screening         General Health  · Sleep: sleeps well and gets 7-8 hours of sleep on average. · Hearing: normal - bilateral.  · Vision: goes for regular eye exams. · Dental: regular dental visits and brushes teeth twice daily.  Health  · History of STDs?: no.     Review of Systems:     Review of Systems   Constitutional: Negative for activity change, appetite change, fatigue and fever. HENT: Negative for congestion, hearing loss, rhinorrhea and sore throat. Eyes: Negative for visual disturbance. Respiratory: Negative for cough and shortness of breath. Cardiovascular: Negative for chest pain and palpitations. Gastrointestinal: Positive for constipation. Negative for abdominal distention, abdominal pain, diarrhea, nausea and vomiting. Genitourinary: Negative for difficulty urinating and dysuria. Musculoskeletal: Negative for arthralgias and myalgias. Neurological: Negative for syncope and headaches.       Past Medical History:     Past Medical History:   Diagnosis Date   • ADHD (attention deficit hyperactivity disorder)    • Atypical pervasive developmental disorder    • Autism    • Bipolar disorder ([address])    • Constipation    • Depression    • Head-banging     Last Assessed:  9/1/17   • Hydronephrosis 9/19/2019   • Hyperlipidemia    • Hypertension    • Intermittent explosive disorder    • Oppositional defiant disorder    • Personality disorder (HCC)    • Schizophrenia ([address])    • 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[address])    • Seizures ([address])    • Sleep disorder    • Vitamin D insufficiency     Last Assessed:  6/22/17      Past Surgical History:     Past Surgical History:   Procedure Laterality Date   • NO PAST SURGERIES     • UMBILICAL HERNIA REPAIR        Social History:     Social History     Socioeconomic History   • Marital status: Single     Spouse name: None   • Number of children: None   • Years of education: None   • Highest education level: None   Occupational History   • None   Tobacco Use   • Smoking status: Never   • Smokeless tobacco: Never   Vaping Use   • Vaping Use: Never used   Substance and Sexual Activity   • Alcohol use: No   • Drug use: No   • Sexual activity: Never   Other Topics Concern   • None   Social History Narrative    History of mold exposure     Social Determinants of Health     Financial Resource Strain: Low Risk  (3/22/2023)    Overall Financial Resource Strain (CARDIA)    • Difficulty of Paying Living Expenses: Not hard at all   Food Insecurity: No Food Insecurity (3/22/2023)    Hunger Vital Sign    • Worried About Running Out of Food in the Last Year: Never true    • Ran Out of Food in the Last Year: Never true   Transportation Needs: No Transportation Needs (3/22/2023)    PRAPARE - Transportation    • Lack of Transportation (Medical): No    • Lack of Transportation (Non-Medical):  No   Physical Activity: Not on file   Stress: No Stress Concern Present (3/22/2023)    [address] [place] [address]    • Feeling of Stress : Not at all   Social Connections: Not on file   Intimate Partner Violence: Not At Risk (3/22/2023)    Humiliation, Afraid, Rape, and Kick questionnaire    • Fear of Current or Ex-Partner: No    • Emotionally Abused: No    • Physically Abused: No    • Sexually Abused: No   Housing Stability: Low Risk  (3/22/2023)    Housing Stability Vital Sign    • Unable to Pay for Housing in the Last Year: No    • Number of Places Lived in the Last Year: 1    • Unstable Housing in the Last Year: No      Family History:     History reviewed. No pertinent family history. Current Medications:     Current Outpatient Medications   Medication Sig Dispense Refill   • acetaminophen (TYLENOL) 325 mg tablet Take 1 tablet (325 mg total) by mouth every 4 (four) hours as needed for mild pain or fever 30 tablet 5   • amLODIPine (NORVASC) 5 mg tablet Take 1 tablet (5 mg total) by mouth daily 30 tablet 3   • ammonium lactate (LAC-HYDRIN) 12 % lotion APPLY TOP. TO AFF. AREA ON SKIN TWICE DAILY AS NEEDED (ECZEMA) *BARRIENTOS 225 g 0   • benzoyl peroxide 10 % LIQD USE TO WASH FACE/ SHOULDERS/BACK As needed  (ACNE)*STOP USING IF EXCESSIVE IRRIT/ g 0   • bismuth subsalicylate (PEPTO BISMOL) 524 mg/30 mL oral suspension Take 15 mL (262 mg total) by mouth every 6 (six) hours as needed for indigestion or diarrhea 360 mL 5   • Camphor-Eucalyptus-Menthol (Vicks VapoRub) 4.7-1.2-2.6 % OINT Apply topically 2 (two) times a day as needed (PRN) 50 g 5   • cetirizine (ZyrTEC) 5 MG chewable tablet Chew 1 tablet (5 mg total) daily at bedtime as needed for allergies 30 tablet 5   • dextromethorphan-guaiFENesin (ROBITUSSIN DM)  mg/5 mL syrup Take 5 mL by mouth 3 (three) times a day as needed for cough or congestion 118 mL 5   • divalproex sodium (DEPAKOTE ER) 500 mg 24 hr tablet Take 3 tablets (1500 mg total) by mouth once daily at 5 pm 30 tablet 6   • docusate sodium (COLACE) 100 mg capsule Take 1 capsule (100 mg total) by mouth 2 (two) times a day as needed for constipation 60 capsule 12   • fluticasone (FLONASE) 50 mcg/act nasal spray 2 sprays into each nostril daily as needed for rhinitis Please take 2 spray, one in each nostril, daily as needed for runny nose and nasal congestion.  16 g 5   • gabapentin (NEURONTIN) 800 mg tablet Take 1 tablet (800 mg total) by mouth 3 times daily at 8 am, 4 pm, and 8 pm 90 tablet 5   • ibuprofen (MOTRIN) 600 mg tablet Take 1 tablet (600 mg total) by mouth every 6 (six) hours as needed for mild pain 30 tablet 0   • L-Methylfolate-Algae (Deplin 15) 15-90.314 MG CAPS Take 15 mg by mouth daily Take 1 tablet by mouth once daily at 8AM 30 capsule 3   • lanolin-mineral oil (BABY OIL) OIL Apply topically if needed for dry skin Use as needed for dry scalp 591 mL 3   • lithium carbonate (LITHOBID) 450 mg CR tablet Take 2 tablets (900 mg total) by mouth daily at 5:30 pm  0   • LORazepam (ATIVAN) 1 mg tablet Take 1 mg by mouth 2 (two) times a day      • Mouthwashes (Listerine Antiseptic) LIQD Apply 10 mL to the mouth or throat daily 250 mL 3   • Neomycin-Bacitracin-Polymyxin (First Aid Antibiotic) 3.5-400-5000 MG-UNIT OINT Apply 3.5 mg topically 2 (two) times a day as needed (topical on scalp lacertation) Apply BID PRN for wound care 28 g 1   • OLANZapine (ZyPREXA) 20 MG tablet Take 0.5 tablet (10 mg) by mouth twice daily at 12 pm and 1.5 tab(15mg)  8 pm (Patient taking differently: Take 0.5 tablet (10 mg) by mouth twice daily at 12 pm and (10mg)  8 pm)  0   • omeprazole (PriLOSEC) 20 mg delayed release capsule Take 1 capsule (20 mg total) by mouth daily 30 capsule 3   • phenol (CHLORASEPTIC) 1.4 % mucosal liquid Apply 1 spray to the mouth or throat every 2 (two) hours as needed (sore throat) 118 mL 5   • pyrithione zinc (HEAD AND SHOULDERS) 1 % shampoo Apply topically daily as needed for dandruff 240 mL 5   • Sodium Fluoride (PreviDent 5000 Booster Plus) 1.1 % PSTE Pea sized amount to brush at bedtime 100 mL 0   • SODIUM FLUORIDE, DENTAL RINSE, (Listerine Smart Rinse) 0.02 % SOLN Swish and spit one capful in mouth before bed daily.  400 mL 0   • Sulfacetamide Sodium, Acne, 10 % LOTN Apply topically in the morning 118 mL 5   • Sunscreens (Sunblock SPF30) LOTN Apply every 2 hours up to five times daily 89 mL 5   • carbamide peroxide (DEBROX) 6.5 % otic solution Administer 5 drops into both ears 2 (two) times a day for 4 days 15 mL 0   • pyrithione zinc (HEAD AND SHOULDERS) 1 % shampoo Apply topically daily as needed for dandruff 240 mL 3     Current Facility-Administered Medications   Medication Dose Route Frequency Provider Last Rate Last Admin   • neomycin-bacitracin-polymyxin (NEOSPORIN) ointment   Topical TID PRN Isaiah Purple Yext, DO          Allergies: Allergies   Allergen Reactions   • Molds & Smuts Other (See Comments)     Patient does not know. Physical Exam:     /81 (BP Location: Left arm, Patient Position: Sitting, Cuff Size: Standard)   Pulse 86   Temp 99 °F (37.2 °C) (Temporal)   Ht 5' 9.3" (1.76 m)   Wt 106 kg (233 lb 12.8 oz)   BMI 34.23 kg/m²     Physical Exam  Vitals and nursing note reviewed. Constitutional:       General: He is not in acute distress. Appearance: He is well-developed. HENT:      Head: Normocephalic and atraumatic. Right Ear: Tympanic membrane, ear canal and external ear normal.      Left Ear: Tympanic membrane, ear canal and external ear normal.      Nose: No congestion or rhinorrhea. Mouth/Throat:      Mouth: Mucous membranes are moist.      Pharynx: Oropharynx is clear. No oropharyngeal exudate or posterior oropharyngeal erythema. Eyes:      Conjunctiva/sclera: Conjunctivae normal.   Cardiovascular:      Rate and Rhythm: Normal rate and regular rhythm. Pulses: Normal pulses. Heart sounds: Normal heart sounds. No murmur heard. No friction rub. No gallop. Pulmonary:      Effort: Pulmonary effort is normal. No respiratory distress. Breath sounds: Normal breath sounds. Abdominal:      General: Abdomen is flat. Bowel sounds are normal.      Palpations: Abdomen is soft. Tenderness: There is no abdominal tenderness. Musculoskeletal:         General: No swelling or deformity. Normal range of motion. Cervical back: Neck supple. Lymphadenopathy:      Cervical: No cervical adenopathy. Skin:     General: Skin is warm and dry. Neurological:      Mental Status: He is alert. Mental status is at baseline.    Psychiatric:         Mood and Affect: Mood normal.          DO Sourav Herrera

## 2023-07-25 ENCOUNTER — TELEPHONE (OUTPATIENT)
Dept: FAMILY MEDICINE CLINIC | Facility: CLINIC | Age: 38
End: 2023-07-25

## 2023-07-26 DIAGNOSIS — R13.10: Primary | ICD-10-CM

## 2023-07-26 NOTE — TELEPHONE ENCOUNTER
Called [facility] to call back to ask caregiver to check with insurance which facility will take patient's insurance.

## 2023-08-14 ENCOUNTER — LAB (OUTPATIENT)
Dept: LAB | Facility: CLINIC | Age: 38
End: 2023-08-14
Payer: COMMERCIAL

## 2023-08-14 DIAGNOSIS — Z11.59 ENCOUNTER FOR HEPATITIS C SCREENING TEST FOR LOW RISK PATIENT: ICD-10-CM

## 2023-08-14 DIAGNOSIS — R13.10: ICD-10-CM

## 2023-08-14 DIAGNOSIS — Z79.899 ENCOUNTER FOR LONG-TERM (CURRENT) USE OF OTHER MEDICATIONS: ICD-10-CM

## 2023-08-14 DIAGNOSIS — K21.9 GASTROESOPHAGEAL REFLUX DISEASE: ICD-10-CM

## 2023-08-14 DIAGNOSIS — E78.5 HYPERLIPIDEMIA, UNSPECIFIED HYPERLIPIDEMIA TYPE: ICD-10-CM

## 2023-08-14 DIAGNOSIS — Z13.29 SCREENING FOR HYPOTHYROIDISM: ICD-10-CM

## 2023-08-14 DIAGNOSIS — Z11.4 SCREENING FOR HIV (HUMAN IMMUNODEFICIENCY VIRUS): ICD-10-CM

## 2023-08-14 LAB
25(OH)D3 SERPL-MCNC: 27.8 NG/ML (ref 30–100)
ALBUMIN SERPL BCP-MCNC: 3.6 G/DL (ref 3.5–5)
ALP SERPL-CCNC: 53 U/L (ref 46–116)
ALT SERPL W P-5'-P-CCNC: 42 U/L (ref 12–78)
ANION GAP SERPL CALCULATED.3IONS-SCNC: 4 MMOL/L
AST SERPL W P-5'-P-CCNC: 24 U/L (ref 5–45)
BASOPHILS # BLD AUTO: 0.07 THOUSANDS/ÂΜL (ref 0–0.1)
BASOPHILS NFR BLD AUTO: 1 % (ref 0–1)
BILIRUB SERPL-MCNC: 0.3 MG/DL (ref 0.2–1)
BUN SERPL-MCNC: 13 MG/DL (ref 5–25)
CALCIUM SERPL-MCNC: 10 MG/DL (ref 8.3–10.1)
CHLORIDE SERPL-SCNC: 113 MMOL/L (ref 96–108)
CHOLEST SERPL-MCNC: 203 MG/DL
CO2 SERPL-SCNC: 25 MMOL/L (ref 21–32)
CREAT SERPL-MCNC: 1.06 MG/DL (ref 0.6–1.3)
EOSINOPHIL # BLD AUTO: 0.19 THOUSAND/ÂΜL (ref 0–0.61)
EOSINOPHIL NFR BLD AUTO: 3 % (ref 0–6)
ERYTHROCYTE [DISTWIDTH] IN BLOOD BY AUTOMATED COUNT: 15.5 % (ref 11.6–15.1)
GFR SERPL CREATININE-BSD FRML MDRD: 89 ML/MIN/1.73SQ M
GLUCOSE P FAST SERPL-MCNC: 91 MG/DL (ref 65–99)
HCT VFR BLD AUTO: 45 % (ref 36.5–49.3)
HCV AB SER QL: NORMAL
HDLC SERPL-MCNC: 40 MG/DL
HGB BLD-MCNC: 13 G/DL (ref 12–17)
HIV 1+2 AB+HIV1 P24 AG SERPL QL IA: NORMAL
HIV 2 AB SERPL QL IA: NORMAL
HIV1 AB SERPL QL IA: NORMAL
HIV1 P24 AG SERPL QL IA: NORMAL
IMM GRANULOCYTES # BLD AUTO: 0.02 THOUSAND/UL (ref 0–0.2)
IMM GRANULOCYTES NFR BLD AUTO: 0 % (ref 0–2)
LDLC SERPL CALC-MCNC: 126 MG/DL (ref 0–100)
LITHIUM SERPL-SCNC: 0.9 MMOL/L (ref 0.6–1.2)
LYMPHOCYTES # BLD AUTO: 2.51 THOUSANDS/ÂΜL (ref 0.6–4.47)
LYMPHOCYTES NFR BLD AUTO: 33 % (ref 14–44)
MCH RBC QN AUTO: 24.1 PG (ref 26.8–34.3)
MCHC RBC AUTO-ENTMCNC: 28.9 G/DL (ref 31.4–37.4)
MCV RBC AUTO: 84 FL (ref 82–98)
MONOCYTES # BLD AUTO: 0.75 THOUSAND/ÂΜL (ref 0.17–1.22)
MONOCYTES NFR BLD AUTO: 10 % (ref 4–12)
NEUTROPHILS # BLD AUTO: 4.14 THOUSANDS/ÂΜL (ref 1.85–7.62)
NEUTS SEG NFR BLD AUTO: 53 % (ref 43–75)
NONHDLC SERPL-MCNC: 163 MG/DL
NRBC BLD AUTO-RTO: 0 /100 WBCS
PLATELET # BLD AUTO: 310 THOUSANDS/UL (ref 149–390)
PMV BLD AUTO: 12.1 FL (ref 8.9–12.7)
POTASSIUM SERPL-SCNC: 4.2 MMOL/L (ref 3.5–5.3)
PROT SERPL-MCNC: 8.2 G/DL (ref 6.4–8.4)
RBC # BLD AUTO: 5.39 MILLION/UL (ref 3.88–5.62)
SODIUM SERPL-SCNC: 142 MMOL/L (ref 135–147)
T4 FREE SERPL-MCNC: 0.72 NG/DL (ref 0.61–1.12)
TRIGL SERPL-MCNC: 183 MG/DL
TSH SERPL DL<=0.05 MIU/L-ACNC: 0.91 UIU/ML (ref 0.45–4.5)
VALPROATE SERPL-MCNC: 95 UG/ML (ref 50–100)
WBC # BLD AUTO: 7.68 THOUSAND/UL (ref 4.31–10.16)

## 2023-08-14 PROCEDURE — 84443 ASSAY THYROID STIM HORMONE: CPT

## 2023-08-14 PROCEDURE — 85025 COMPLETE CBC W/AUTO DIFF WBC: CPT

## 2023-08-14 PROCEDURE — 80164 ASSAY DIPROPYLACETIC ACD TOT: CPT

## 2023-08-14 PROCEDURE — 36415 COLL VENOUS BLD VENIPUNCTURE: CPT

## 2023-08-14 PROCEDURE — 86803 HEPATITIS C AB TEST: CPT

## 2023-08-14 PROCEDURE — 80053 COMPREHEN METABOLIC PANEL: CPT

## 2023-08-14 PROCEDURE — 87389 HIV-1 AG W/HIV-1&-2 AB AG IA: CPT

## 2023-08-14 PROCEDURE — 80178 ASSAY OF LITHIUM: CPT

## 2023-08-14 PROCEDURE — 82306 VITAMIN D 25 HYDROXY: CPT

## 2023-08-14 PROCEDURE — 84439 ASSAY OF FREE THYROXINE: CPT

## 2023-08-14 PROCEDURE — 80165 DIPROPYLACETIC ACID FREE: CPT

## 2023-08-14 PROCEDURE — 80061 LIPID PANEL: CPT

## 2023-08-16 LAB — VALPROATE FREE SERPL-MCNC: 17 UG/ML (ref 6–22)

## 2023-08-17 DIAGNOSIS — E55.9 VITAMIN D DEFICIENCY: Primary | ICD-10-CM

## 2023-08-17 RX ORDER — MELATONIN
1000 DAILY
Qty: 90 TABLET | Refills: 3 | Status: SHIPPED | OUTPATIENT
Start: 2023-08-17

## 2023-08-17 NOTE — RESULT ENCOUNTER NOTE
Nik Veronica recently had labs drawn. 1. His vitamin D was low which I will send a daily supplement to the pharmacy for. This is to be added to his morning medications. 2. His cholesterol and triglycerides are elevated. At this time diet changes to avoid high fatty foods, saturated fats, trans fats, and junk food. Increased exercise and active time during the day will help as well. We will continue to keep an eye on this lab and repeat it every so often. No medications ar indicated at this time. Otherwise, the labs are within an acceptable range. Please help me inform patient of these results.  Thank You!!

## 2023-08-18 ENCOUNTER — TELEPHONE (OUTPATIENT)
Dept: FAMILY MEDICINE CLINIC | Facility: CLINIC | Age: 38
End: 2023-08-18

## 2023-08-18 NOTE — TELEPHONE ENCOUNTER
Attempted to call care giver in regards of pt's lab results, no answer. Left a vm to call back when able.

## 2023-08-31 ENCOUNTER — TELEPHONE (OUTPATIENT)
Dept: FAMILY MEDICINE CLINIC | Facility: CLINIC | Age: 38
End: 2023-08-31

## 2023-08-31 NOTE — TELEPHONE ENCOUNTER
Scheduled for annual px on 12/01/29 would like to get a ppd three days before appt in order to have it read the day of appt. Caller is aware of the last ppd done 12/13/22 but stated agency requires for done before last date. Please advise.

## 2023-08-31 NOTE — TELEPHONE ENCOUNTER
Does the patient have a form to be filled out?  no    If yes, what color folder is the form placed in?  none  Not yet   Patient requesting PPD, please ask the following questions:    Have you ever tested positive for Tuberculosis?  no    Have you had an X-Ray done (for positive result)?  no    Have you had blood work done (for positive result)? no      To your knowledge, have you ever received the BCG vaccine?  no  *A vaccine primarily used against tuberculosis, in countries where tuberculosis is common.

## 2023-09-18 ENCOUNTER — TELEPHONE (OUTPATIENT)
Dept: FAMILY MEDICINE CLINIC | Facility: CLINIC | Age: 38
End: 2023-09-18

## 2023-09-18 DIAGNOSIS — L70.0 ACNE VULGARIS: ICD-10-CM

## 2023-09-18 RX ORDER — BENZOYL PEROXIDE 100 MG/ML
LIQUID TOPICAL
Qty: 237 G | Refills: 0 | Status: SHIPPED | OUTPATIENT
Start: 2023-09-18

## 2023-10-25 DIAGNOSIS — L30.9 DERMATITIS: ICD-10-CM

## 2023-10-25 RX ORDER — AMMONIUM LACTATE 12 G/100G
LOTION TOPICAL
Qty: 225 G | Refills: 0 | Status: SHIPPED | OUTPATIENT
Start: 2023-10-25

## 2023-11-13 DIAGNOSIS — L70.0 ACNE VULGARIS: ICD-10-CM

## 2023-11-13 RX ORDER — BENZOYL PEROXIDE 100 MG/ML
LIQUID TOPICAL
Qty: 237 G | Refills: 0 | Status: SHIPPED | OUTPATIENT
Start: 2023-11-13

## 2023-11-24 ENCOUNTER — VBI (OUTPATIENT)
Dept: ADMINISTRATIVE | Facility: OTHER | Age: 38
End: 2023-11-24

## 2023-11-29 ENCOUNTER — CLINICAL SUPPORT (OUTPATIENT)
Dept: FAMILY MEDICINE CLINIC | Facility: CLINIC | Age: 38
End: 2023-11-29

## 2023-11-29 DIAGNOSIS — Z11.1 ENCOUNTER FOR PPD TEST: ICD-10-CM

## 2023-11-29 DIAGNOSIS — Z11.1 ENCOUNTER FOR PPD SKIN TEST READING: Primary | ICD-10-CM

## 2023-11-29 PROCEDURE — 86580 TB INTRADERMAL TEST: CPT

## 2023-11-29 NOTE — PROGRESS NOTES
Juancho Pulliam, 41 y.o. male is here today for placement of PPD test  Reason for PPD test:Group Home  Pt taken PPD test before: yes  Has the patient ever received the BCG vaccine?: no  Has the patient been in recent contact with anyone known or suspected of having active TB disease?: no       Date of exposure (if applicable): N/A       Name of person they were exposed to (if applicable): N/A  O: Alert and oriented in NAD. P:  PPD placed on 11/29/2023. Patient care givers advised to return for reading within 48-72 hours. Patient here with caregivers for a nurse visit for ppd placement. Tubersol 0.1 ml administered on right lower arm at 1:40 pm and patient tolerate well.

## 2023-11-30 ENCOUNTER — TELEPHONE (OUTPATIENT)
Dept: FAMILY MEDICINE CLINIC | Facility: CLINIC | Age: 38
End: 2023-11-30

## 2023-12-01 ENCOUNTER — CLINICAL SUPPORT (OUTPATIENT)
Dept: FAMILY MEDICINE CLINIC | Facility: CLINIC | Age: 38
End: 2023-12-01

## 2023-12-01 DIAGNOSIS — Z11.1 ENCOUNTER FOR PPD SKIN TEST READING: Primary | ICD-10-CM

## 2023-12-01 LAB
INDURATION: 0 MM
TB SKIN TEST: NEGATIVE

## 2023-12-01 NOTE — PROGRESS NOTES
Patient here today has a nurse visit with caregivers for ppd reading. PPD has negative interpretation with a 0 mm induration. Form at appt time completed. Results attached and handed back to patient care givers. No question or concerns at this moment.

## 2023-12-05 ENCOUNTER — TELEPHONE (OUTPATIENT)
Dept: FAMILY MEDICINE CLINIC | Facility: CLINIC | Age: 38
End: 2023-12-05

## 2023-12-05 ENCOUNTER — OFFICE VISIT (OUTPATIENT)
Dept: FAMILY MEDICINE CLINIC | Facility: CLINIC | Age: 38
End: 2023-12-05

## 2023-12-05 VITALS
HEART RATE: 96 BPM | DIASTOLIC BLOOD PRESSURE: 92 MMHG | HEIGHT: 69 IN | OXYGEN SATURATION: 97 % | WEIGHT: 242.2 LBS | RESPIRATION RATE: 18 BRPM | TEMPERATURE: 98.7 F | SYSTOLIC BLOOD PRESSURE: 127 MMHG | BODY MASS INDEX: 35.87 KG/M2

## 2023-12-05 DIAGNOSIS — F98.4 HEAD-BANGING: ICD-10-CM

## 2023-12-05 DIAGNOSIS — K59.04 CHRONIC IDIOPATHIC CONSTIPATION: Primary | ICD-10-CM

## 2023-12-05 PROCEDURE — 3080F DIAST BP >= 90 MM HG: CPT | Performed by: FAMILY MEDICINE

## 2023-12-05 PROCEDURE — 99213 OFFICE O/P EST LOW 20 MIN: CPT | Performed by: FAMILY MEDICINE

## 2023-12-05 PROCEDURE — 3074F SYST BP LT 130 MM HG: CPT | Performed by: FAMILY MEDICINE

## 2023-12-05 RX ORDER — POLYETHYLENE GLYCOL 3350 17 G/17G
17 POWDER, FOR SOLUTION ORAL 2 TIMES DAILY PRN
Qty: 20 EACH | Refills: 1 | Status: SHIPPED | OUTPATIENT
Start: 2023-12-05

## 2023-12-05 RX ORDER — OLANZAPINE 5 MG/1
TABLET ORAL
COMMUNITY
Start: 2023-11-24

## 2023-12-05 RX ORDER — POLYETHYLENE GLYCOL 3350 17 G/17G
17 POWDER, FOR SOLUTION ORAL DAILY
Qty: 20 EACH | Refills: 1 | Status: SHIPPED | OUTPATIENT
Start: 2023-12-05 | End: 2023-12-05 | Stop reason: SDUPTHER

## 2023-12-05 RX ORDER — OLANZAPINE 10 MG/1
TABLET ORAL
COMMUNITY
Start: 2023-11-24

## 2023-12-05 RX ORDER — IBUPROFEN 600 MG/1
600 TABLET ORAL EVERY 6 HOURS PRN
Qty: 30 TABLET | Refills: 3 | Status: SHIPPED | OUTPATIENT
Start: 2023-12-05

## 2023-12-05 NOTE — ASSESSMENT & PLAN NOTE
Chronic constipation, with current flare  Patient's bowel movements are hard and difficult to pass, no blood associated  Currently taking Colace as needed    Plan:  Added MiraLAX twice daily as needed

## 2023-12-05 NOTE — PROGRESS NOTES
Name: Brian Cornejo      : 1985      MRN: [id number]  Encounter Provider: Dominique Nathan DO  Encounter Date: 2023   Encounter department: [address]     1. Chronic idiopathic constipation  Assessment & Plan:  Chronic constipation, with current flare  Patient's bowel movements are hard and difficult to pass, no blood associated  Currently taking Colace as needed    Plan:  Added MiraLAX twice daily as needed    Orders:  -     polyethylene glycol (MIRALAX) 17 g packet; Take 17 g by mouth 2 (two) times a day as needed (for constipation) To be used as needed for constipation and hard stools    2. Head-banging  -     ibuprofen (MOTRIN) 600 mg tablet; Take 1 tablet (600 mg total) by mouth every 6 (six) hours as needed for mild pain           Subjective      HPI  16-year-old male with multiple comorbidities presents today for a 4-month follow-up with his chief complaint being abdominal pain. He states that his last bowel movement was this morning and he has been having difficulty passing stool as they have been hard. His caretakers tell me that he has Colace as needed, however he has not been telling his caretakers that he has been having difficult bowel movements. He states that he noticed the abdominal pain which is in the center of his abdomen around the start of his constipation. He denies any fevers or chills, and there is no blood in his stool. Review of Systems   Constitutional:  Negative for chills and fever. HENT:  Negative for sore throat. Respiratory:  Negative for cough and shortness of breath. Cardiovascular:  Negative for chest pain and palpitations. Gastrointestinal:  Positive for abdominal pain and constipation. Negative for blood in stool, diarrhea, nausea and vomiting. Genitourinary:  Negative for dysuria and hematuria. Musculoskeletal:  Negative for arthralgias and back pain.    Skin:  Negative for color change and rash. Neurological:  Negative for syncope and headaches. Psychiatric/Behavioral:  Negative for agitation and behavioral problems. All other systems reviewed and are negative. Current Outpatient Medications on File Prior to Visit   Medication Sig    ammonium lactate (LAC-HYDRIN) 12 % lotion APPLY TOP. TO AFF. AREA ON SKIN TWICE DAILY AS NEEDED (ECZEMA) *BARRIENTOS    benzoyl peroxide 10 % LIQD USE TO WASH FACE/ SHOULDERS/BACK As needed  (ACNE)*STOP USING IF EXCESSIVE IRRIT/RED    bismuth subsalicylate (PEPTO BISMOL) 524 mg/30 mL oral suspension Take 15 mL (262 mg total) by mouth every 6 (six) hours as needed for indigestion or diarrhea    Camphor-Eucalyptus-Menthol (Vicks VapoRub) 4.7-1.2-2.6 % OINT Apply topically 2 (two) times a day as needed (PRN)    cetirizine (ZyrTEC) 5 MG chewable tablet Chew 1 tablet (5 mg total) daily at bedtime as needed for allergies    cholecalciferol (VITAMIN D3) 1,000 units tablet Take 1 tablet (1,000 Units total) by mouth daily    dextromethorphan-guaiFENesin (ROBITUSSIN DM)  mg/5 mL syrup Take 5 mL by mouth 3 (three) times a day as needed for cough or congestion    divalproex sodium (DEPAKOTE ER) 500 mg 24 hr tablet Take 3 tablets (1500 mg total) by mouth once daily at 5 pm    docusate sodium (COLACE) 100 mg capsule Take 1 capsule (100 mg total) by mouth 2 (two) times a day as needed for constipation    fluticasone (FLONASE) 50 mcg/act nasal spray 2 sprays into each nostril daily as needed for rhinitis Please take 2 spray, one in each nostril, daily as needed for runny nose and nasal congestion.     gabapentin (NEURONTIN) 800 mg tablet Take 1 tablet (800 mg total) by mouth 3 times daily at 8 am, 4 pm, and 8 pm    lanolin-mineral oil (BABY OIL) OIL Apply topically if needed for dry skin Use as needed for dry scalp    lithium carbonate (LITHOBID) 450 mg CR tablet Take 2 tablets (900 mg total) by mouth daily at 5:30 pm    LORazepam (ATIVAN) 1 mg tablet Take 1 mg by mouth 2 (two) times a day     Mouthwashes (Listerine Antiseptic) LIQD Apply 10 mL to the mouth or throat daily    Neomycin-Bacitracin-Polymyxin (First Aid Antibiotic) 3.5-400-5000 MG-UNIT OINT Apply 3.5 mg topically 2 (two) times a day as needed (topical on scalp lacertation) Apply BID PRN for wound care    OLANZapine (ZyPREXA) 10 mg tablet     OLANZapine (ZyPREXA) 5 mg tablet     phenol (CHLORASEPTIC) 1.4 % mucosal liquid Apply 1 spray to the mouth or throat every 2 (two) hours as needed (sore throat)    pyrithione zinc (HEAD AND SHOULDERS) 1 % shampoo Apply topically daily as needed for dandruff    Sodium Fluoride (PreviDent 5000 Booster Plus) 1.1 % PSTE Pea sized amount to brush at bedtime    SODIUM FLUORIDE, DENTAL RINSE, (Listerine Smart Rinse) 0.02 % SOLN Swish and spit one capful in mouth before bed daily.     Sulfacetamide Sodium, Acne, 10 % LOTN Apply topically in the morning    Sunscreens (Sunblock SPF30) LOTN Apply every 2 hours up to five times daily    amLODIPine (NORVASC) 5 mg tablet Take 1 tablet (5 mg total) by mouth daily    carbamide peroxide (DEBROX) 6.5 % otic solution Administer 5 drops into both ears 2 (two) times a day for 4 days    L-Methylfolate-Algae (Deplin 15) 15-90.314 MG CAPS Take 15 mg by mouth daily Take 1 tablet by mouth once daily at 8AM    omeprazole (PriLOSEC) 20 mg delayed release capsule Take 1 capsule (20 mg total) by mouth daily    pyrithione zinc (HEAD AND SHOULDERS) 1 % shampoo Apply topically daily as needed for dandruff    [DISCONTINUED] acetaminophen (TYLENOL) 325 mg tablet Take 1 tablet (325 mg total) by mouth every 4 (four) hours as needed for mild pain or fever (Patient not taking: Reported on 12/5/2023)    [DISCONTINUED] ibuprofen (MOTRIN) 600 mg tablet Take 1 tablet (600 mg total) by mouth every 6 (six) hours as needed for mild pain    [DISCONTINUED] OLANZapine (ZyPREXA) 20 MG tablet Take 0.5 tablet (10 mg) by mouth twice daily at 12 pm and 1.5 tab(15mg)  8 pm (Patient taking differently: Take 0.5 tablet (10 mg) by mouth twice daily at 12 pm and (10mg)  8 pm)       Objective     /92 (BP Location: Left arm, Patient Position: Sitting, Cuff Size: Large)   Pulse 96   Temp 98.7 °F (37.1 °C) (Temporal)   Resp 18   Ht 5' 9.3" (1.76 m)   Wt 110 kg (242 lb 3.2 oz)   SpO2 97%   BMI 35.46 kg/m²     Physical Exam  Vitals reviewed. Constitutional:       General: He is not in acute distress. Appearance: Normal appearance. He is normal weight. He is not ill-appearing. HENT:      Head: Normocephalic and atraumatic. Mouth/Throat:      Mouth: Mucous membranes are moist.      Pharynx: Oropharynx is clear. Eyes:      Conjunctiva/sclera: Conjunctivae normal.   Cardiovascular:      Rate and Rhythm: Normal rate and regular rhythm. Heart sounds: No murmur heard. No friction rub. No gallop. Pulmonary:      Effort: Pulmonary effort is normal. No respiratory distress. Breath sounds: Normal breath sounds. No wheezing, rhonchi or rales. Abdominal:      General: Abdomen is flat. Bowel sounds are normal.      Palpations: Abdomen is soft. Tenderness: There is abdominal tenderness. There is guarding. Musculoskeletal:      Cervical back: Normal range of motion. Skin:     General: Skin is warm and dry. Neurological:      General: No focal deficit present. Mental Status: He is alert.    Psychiatric:         Mood and Affect: Mood normal.         Behavior: Behavior normal.       Israel Lawrence DO

## 2023-12-18 DIAGNOSIS — K05.10 GINGIVITIS: ICD-10-CM

## 2023-12-18 DIAGNOSIS — L70.0 ACNE VULGARIS: ICD-10-CM

## 2023-12-18 DIAGNOSIS — K08.9 POOR DENTITION: ICD-10-CM

## 2023-12-18 RX ORDER — BENZOYL PEROXIDE 100 MG/ML
LIQUID TOPICAL
Qty: 237 G | Refills: 0 | Status: SHIPPED | OUTPATIENT
Start: 2023-12-18

## 2023-12-18 RX ORDER — SODIUM FLUORIDE 6 MG/ML
PASTE, DENTIFRICE DENTAL
Qty: 100 ML | Refills: 0 | Status: SHIPPED | OUTPATIENT
Start: 2023-12-18

## 2023-12-18 NOTE — TELEPHONE ENCOUNTER
Padmini from [employer] called and left a message on our clincial line requesting a refill of the following medications:   -Benzoyl peroxide 10% LIQD  -Listerine mouthwash  -PreviDent    Please review and sign off if warranted. Thank you

## 2023-12-20 DIAGNOSIS — I10 ESSENTIAL HYPERTENSION: ICD-10-CM

## 2023-12-20 DIAGNOSIS — K21.9 GASTROESOPHAGEAL REFLUX DISEASE: ICD-10-CM

## 2023-12-21 RX ORDER — OMEPRAZOLE 20 MG/1
20 CAPSULE, DELAYED RELEASE ORAL DAILY
Qty: 30 CAPSULE | Refills: 3 | Status: SHIPPED | OUTPATIENT
Start: 2023-12-21

## 2023-12-21 RX ORDER — AMLODIPINE BESYLATE 5 MG/1
5 TABLET ORAL DAILY
Qty: 30 TABLET | Refills: 3 | Status: SHIPPED | OUTPATIENT
Start: 2023-12-21

## 2024-01-25 DIAGNOSIS — I10 ESSENTIAL HYPERTENSION: ICD-10-CM

## 2024-01-25 DIAGNOSIS — K21.9 GASTROESOPHAGEAL REFLUX DISEASE: ICD-10-CM

## 2024-01-25 RX ORDER — OMEPRAZOLE 20 MG/1
20 CAPSULE, DELAYED RELEASE ORAL DAILY
Qty: 30 CAPSULE | Refills: 3 | Status: SHIPPED | OUTPATIENT
Start: 2024-01-25

## 2024-01-25 RX ORDER — AMLODIPINE BESYLATE 5 MG/1
5 TABLET ORAL DAILY
Qty: 30 TABLET | Refills: 3 | Status: SHIPPED | OUTPATIENT
Start: 2024-01-25

## 2024-01-25 NOTE — TELEPHONE ENCOUNTER
[employer] called requesting refills on omeprazole and amlodipine. Please review and sign off if warranted.

## 2024-02-05 ENCOUNTER — OFFICE VISIT (OUTPATIENT)
Dept: DENTISTRY | Facility: CLINIC | Age: 39
End: 2024-02-05

## 2024-02-05 VITALS — DIASTOLIC BLOOD PRESSURE: 84 MMHG | HEART RATE: 108 BPM | SYSTOLIC BLOOD PRESSURE: 130 MMHG | TEMPERATURE: 99 F

## 2024-02-05 DIAGNOSIS — K03.6 ACCRETIONS ON TEETH: ICD-10-CM

## 2024-02-05 DIAGNOSIS — K02.61 DENTAL CARIES ON SMOOTH SURFACE LIMITED TO ENAMEL: ICD-10-CM

## 2024-02-05 DIAGNOSIS — Z01.20 ENCOUNTER FOR DENTAL EXAMINATION: Primary | ICD-10-CM

## 2024-02-05 PROCEDURE — D1206 TOPICAL APPLICATION OF FLUORIDE VARNISH: HCPCS

## 2024-02-05 PROCEDURE — D0220 INTRAORAL - PERIAPICAL FIRST RADIOGRAPHIC IMAGE: HCPCS

## 2024-02-05 PROCEDURE — D0230 INTRAORAL - PERIAPICAL EACH ADDITIONAL RADIOGRAPHIC IMAGE: HCPCS

## 2024-02-05 PROCEDURE — D1110 PROPHYLAXIS - ADULT: HCPCS

## 2024-02-05 PROCEDURE — D0120 PERIODIC ORAL EVALUATION - ESTABLISHED PATIENT: HCPCS

## 2024-02-05 NOTE — DENTAL PROCEDURE DETAILS
PERIODIC EXAM, ADULT PROPHY AND 4 PAX   REVIEWED MED HX: meds, allergies, health changes reviewed in [facility]. All consents signed.    Patient presented from group home with caregiver    CHIEF CONCERN:  pain LL  states not when he is eating but when he /they brushes teeth   PAIN SCALE: hard to determine  SP needs   ASA CLASS:  II  PLAQUE:     moderate      CALCULUS:     moderate     BLEEDING:      light     STAIN :   none      ORAL HYGIENE:       fair        Hand scaled, polished and flossed. Used Cavitron.     Oral Hygiene Instruction:  recommended brushing 2 x daily for 2 minutes MIN, recommended flossing daily, reviewed dietary precautions.  Dispensed: toothbrush, toothpaste and floss    Visual and Tactile Intraoral/ Extraoral evaluation: Oral and Oropharyngeal cancer evaluation. No findings     Dr. SMITH  exam=   Reviewed with patient clinical and radiographic findings and patient verbalized understanding. All questions and concerns addressed.     REFERRALS: no referrals needed     CARIES FINDINGS:        TREATMENT  PLAN :   1) SDF # 21  tooth has SSC but he has been complaining of sensitivity  He only has one remaining lower tooth     caregiver states he does well eating- they just make sure it is smaller and softer    Next Recall: 4 month recall preferably   may need new RX for Prevident    Last BWX:   PAX taken of remaining teeth today  Last Panorex/ FMX : 2022

## 2024-02-05 NOTE — DENTAL PROCEDURE DETAILS
Prophylaxis completed with ultrasonic  and hand instrumentation.  Soft plaque removed and supragingival calculus removed   Fl2 applied to sensitive areas  Needs to continue with Prevident and adjunctive Fl2    Polished with prophy cup and paste.  Flossed and provided Oral Health Instructions.  Demonstrated proper brushing and flossing technique.  Patient left satisfied and ambulatory.  Caregiver will let us know if they need refill on Prevident

## 2024-02-21 PROBLEM — H10.13 ALLERGIC CONJUNCTIVITIS OF BOTH EYES AND RHINITIS: Status: RESOLVED | Noted: 2020-04-29 | Resolved: 2024-02-21

## 2024-02-21 PROBLEM — J30.9 ALLERGIC CONJUNCTIVITIS OF BOTH EYES AND RHINITIS: Status: RESOLVED | Noted: 2020-04-29 | Resolved: 2024-02-21

## 2024-03-08 DIAGNOSIS — K05.10 GINGIVITIS: ICD-10-CM

## 2024-03-08 DIAGNOSIS — L70.0 ACNE VULGARIS: ICD-10-CM

## 2024-03-08 RX ORDER — EUCALYP/ME-SALICYLATE/MEN/THYM
10 MOUTHWASH MUCOUS MEMBRANE DAILY
Qty: 250 ML | Refills: 3 | Status: SHIPPED | OUTPATIENT
Start: 2024-03-08

## 2024-03-08 RX ORDER — BENZOYL PEROXIDE 100 MG/ML
LIQUID TOPICAL
Qty: 237 G | Refills: 0 | Status: SHIPPED | OUTPATIENT
Start: 2024-03-08

## 2024-04-15 ENCOUNTER — APPOINTMENT (EMERGENCY)
Dept: CT IMAGING | Facility: HOSPITAL | Age: 39
End: 2024-04-15
Payer: COMMERCIAL

## 2024-04-15 ENCOUNTER — HOSPITAL ENCOUNTER (EMERGENCY)
Facility: HOSPITAL | Age: 39
Discharge: HOME | End: 2024-04-15
Attending: EMERGENCY MEDICINE
Payer: COMMERCIAL

## 2024-04-15 VITALS
TEMPERATURE: 98.4 F | RESPIRATION RATE: 18 BRPM | SYSTOLIC BLOOD PRESSURE: 123 MMHG | DIASTOLIC BLOOD PRESSURE: 65 MMHG | HEART RATE: 56 BPM | OXYGEN SATURATION: 97 %

## 2024-04-15 DIAGNOSIS — S00.03XA HEMATOMA OF SCALP, INITIAL ENCOUNTER: ICD-10-CM

## 2024-04-15 DIAGNOSIS — T14.8XXA ABRASION: ICD-10-CM

## 2024-04-15 DIAGNOSIS — R46.89 AGGRESSIVE BEHAVIOR: ICD-10-CM

## 2024-04-15 DIAGNOSIS — S09.90XA INJURY OF HEAD, INITIAL ENCOUNTER: Primary | ICD-10-CM

## 2024-04-15 PROCEDURE — 99285 EMERGENCY DEPT VISIT HI MDM: CPT

## 2024-04-15 PROCEDURE — 96372 THER/PROPH/DIAG INJ SC/IM: CPT

## 2024-04-15 PROCEDURE — 70450 CT HEAD/BRAIN W/O DYE: CPT

## 2024-04-15 RX ORDER — GABAPENTIN 400 MG/1
800 CAPSULE ORAL ONCE
Status: COMPLETED | OUTPATIENT
Start: 2024-04-15 | End: 2024-04-15

## 2024-04-15 RX ORDER — LORAZEPAM 2 MG/ML
2 INJECTION INTRAMUSCULAR ONCE
Status: COMPLETED | OUTPATIENT
Start: 2024-04-15 | End: 2024-04-15

## 2024-04-15 RX ORDER — HALOPERIDOL 5 MG/ML
5 INJECTION INTRAMUSCULAR ONCE
Status: COMPLETED | OUTPATIENT
Start: 2024-04-15 | End: 2024-04-15

## 2024-04-15 RX ORDER — DIVALPROEX SODIUM 500 MG/1
1500 TABLET, EXTENDED RELEASE ORAL ONCE
Status: COMPLETED | OUTPATIENT
Start: 2024-04-15 | End: 2024-04-15

## 2024-04-15 RX ORDER — LITHIUM CARBONATE 450 MG
900 TABLET, EXTENDED RELEASE ORAL ONCE
Status: COMPLETED | OUTPATIENT
Start: 2024-04-15 | End: 2024-04-15

## 2024-04-15 RX ADMIN — GABAPENTIN 800 MG: 400 CAPSULE ORAL at 19:26

## 2024-04-15 RX ADMIN — LITHIUM CARBONATE 900 MG: 450 TABLET, EXTENDED RELEASE ORAL at 19:24

## 2024-04-15 RX ADMIN — DIVALPROEX SODIUM 1500 MG: 500 TABLET, FILM COATED, EXTENDED RELEASE ORAL at 19:27

## 2024-04-15 RX ADMIN — LORAZEPAM 2 MG: 2 INJECTION INTRAMUSCULAR; INTRAVENOUS at 17:34

## 2024-04-15 RX ADMIN — HALOPERIDOL LACTATE 5 MG: 5 INJECTION, SOLUTION INTRAMUSCULAR at 17:34

## 2024-04-15 RX ADMIN — HALOPERIDOL LACTATE 5 MG: 5 INJECTION, SOLUTION INTRAMUSCULAR at 19:32

## 2024-04-15 NOTE — ED PROVIDER NOTES
Patient signed out to me.    CT pending at that time.     CT head without contrast   Final Result   Anterior scalp soft tissue injury without a calvarial fracture or intracranial hemorrhage or injury.                  Workstation performed: [id number]           Time reflects when diagnosis was documented in both MDM as applicable and the Disposition within this note       Time User Action Codes Description Comment    4/15/2024  7:12 PM Miranda Pineda [S09.90XA] Injury of head, initial encounter     4/15/2024  7:12 PM Miranda Pineda Add [R46.89] Aggressive behavior     4/15/2024  7:12 PM Miranda Pineda Add [S00.03XA] Hematoma of scalp, initial encounter     4/15/2024  7:12 PM Miranda Pineda Add [T14.8XXA] Abrasion           ED Disposition       ED Disposition   Discharge    Condition   Stable    Date/Time   Mon Apr 15, 2024  7:12 PM    Comment   Delmer EDDY Roatch discharge to home/self care.                   Follow-up Information       Follow up With Specialties Details Why Contact Info Additional Information    [facility] hospital Emergency Department Emergency Medicine  As needed, If symptoms worsen [address] [facility] [id number]  [phone] [facility] Emergency Department, [address], [place], [address]               Miranda Pineda DO  04/15/24 1943

## 2024-04-15 NOTE — ED NOTES
Pt is being taking off restraints at this time. Pt being discharged with care takers.      Natacha Clark RN  04/15/24 1708
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
## 2024-04-15 NOTE — RESTRAINT FACE TO FACE
Restraint Face to Face   Delmer Giordano 39 y.o. male MRN: [id number]  Unit/Bed#: ED-03 Encounter: [id number]      Physical Evaluation agitated, aggressive  Purpose for Restraints/ Seclusion High risk for harm to others  Patient's reaction to the intervention initially agitated, now more calm  Patient's medical condition stable  Patient's Behavioral condition agitated  Restraints to be Continued

## 2024-04-15 NOTE — ED PROVIDER NOTES
"39-year-old male here for evaluation of history  Chief Complaint   Patient presents with    Head Injury     Pt is schizophrenic and states he was upset and proceeded to repeatedly hit his head on the wall.      Arthur is a 39-year-old male group home resident with history of autism spectrum disorder and schizophrenia.  He is here for evaluation after striking his head against a concrete wall.  This is apparently fairly typical behavior for the patient when he gets upset.  Whenever he is angry he will bang his head against things and at times has been violent.  During my initial evaluation he appears more calm.  He is not able to answer detailed questions or provide a detailed history of present illness but states that he feels \"better.\"          Prior to Admission Medications   Prescriptions Last Dose Informant Patient Reported? Taking?   Camphor-Eucalyptus-Menthol (Vicks VapoRub) 4.7-1.2-2.6 % OINT   No No   Sig: Apply topically 2 (two) times a day as needed (PRN)   L-Methylfolate-Algae (Deplin 15) 15-90.314 MG CAPS   No No   Sig: Take 15 mg by mouth daily Take 1 tablet by mouth once daily at 8AM   LORazepam (ATIVAN) 1 mg tablet  Care Giver Yes Yes   Sig: Take 1 mg by mouth 2 (two) times a day    Mouthwashes (Listerine Antiseptic) LIQD   No No   Sig: Apply 10 mL to the mouth or throat daily   Neomycin-Bacitracin-Polymyxin (First Aid Antibiotic) 3.5-400-5000 MG-UNIT OINT   No No   Sig: Apply 3.5 mg topically 2 (two) times a day as needed (topical on scalp lacertation) Apply BID PRN for wound care   OLANZapine (ZyPREXA) 10 mg tablet   Yes Yes   OLANZapine (ZyPREXA) 5 mg tablet   Yes Yes   SODIUM FLUORIDE, DENTAL RINSE, (Listerine Smart Rinse) 0.02 % SOLN   No No   Sig: Swish and spit one capful in mouth before bed daily.   Sodium Fluoride (PreviDent 5000 Booster Plus) 1.1 % PSTE   No No   Sig: Pea sized amount to brush at bedtime   Sulfacetamide Sodium, Acne, 10 % LOTN   No No   Sig: Apply topically in the morning "   Sunscreens (Sunblock SPF30) LOTN   No No   Sig: Apply every 2 hours up to five times daily   amLODIPine (NORVASC) 5 mg tablet   No Yes   Sig: Take 1 tablet (5 mg total) by mouth daily   ammonium lactate (LAC-HYDRIN) 12 % lotion   No No   Sig: APPLY TOP. TO AFF. AREA ON SKIN TWICE DAILY AS NEEDED (ECZEMA) *BARRIENTOS   benzoyl peroxide 10 % LIQD   No No   Sig: USE TO WASH FACE/ SHOULDERS/BACK As needed  (ACNE)*STOP USING IF EXCESSIVE IRRIT/RED   bismuth subsalicylate (PEPTO BISMOL) 524 mg/30 mL oral suspension   No No   Sig: Take 15 mL (262 mg total) by mouth every 6 (six) hours as needed for indigestion or diarrhea   carbamide peroxide (DEBROX) 6.5 % otic solution   No No   Sig: Administer 5 drops into both ears 2 (two) times a day for 4 days   cetirizine (ZyrTEC) 5 MG chewable tablet   No Yes   Sig: Chew 1 tablet (5 mg total) daily at bedtime as needed for allergies   cholecalciferol (VITAMIN D3) 1,000 units tablet   No Yes   Sig: Take 1 tablet (1,000 Units total) by mouth daily   dextromethorphan-guaiFENesin (ROBITUSSIN DM)  mg/5 mL syrup   No No   Sig: Take 5 mL by mouth 3 (three) times a day as needed for cough or congestion   divalproex sodium (DEPAKOTE ER) 500 mg 24 hr tablet  Care Giver No Yes   Sig: Take 3 tablets (1500 mg total) by mouth once daily at 5 pm   docusate sodium (COLACE) 100 mg capsule   No Yes   Sig: Take 1 capsule (100 mg total) by mouth 2 (two) times a day as needed for constipation   fluticasone (FLONASE) 50 mcg/act nasal spray   No Yes   Si sprays into each nostril daily as needed for rhinitis Please take 2 spray, one in each nostril, daily as needed for runny nose and nasal congestion.   gabapentin (NEURONTIN) 800 mg tablet   No Yes   Sig: Take 1 tablet (800 mg total) by mouth 3 times daily at 8 am, 4 pm, and 8 pm   ibuprofen (MOTRIN) 600 mg tablet   No Yes   Sig: Take 1 tablet (600 mg total) by mouth every 6 (six) hours as needed for mild pain   lanolin-mineral oil (BABY OIL) OIL    No No   Sig: Apply topically if needed for dry skin Use as needed for dry scalp   lithium carbonate (LITHOBID) 450 mg CR tablet  Care Giver No Yes   Sig: Take 2 tablets (900 mg total) by mouth daily at 5:30 pm   omeprazole (PriLOSEC) 20 mg delayed release capsule   No Yes   Sig: Take 1 capsule (20 mg total) by mouth daily   phenol (CHLORASEPTIC) 1.4 % mucosal liquid   No No   Sig: Apply 1 spray to the mouth or throat every 2 (two) hours as needed (sore throat)   polyethylene glycol (MIRALAX) 17 g packet   No No   Sig: Take 17 g by mouth 2 (two) times a day as needed (for constipation) To be used as needed for constipation and hard stools   pyrithione zinc (HEAD AND SHOULDERS) 1 % shampoo   No No   Sig: Apply topically daily as needed for dandruff   pyrithione zinc (HEAD AND SHOULDERS) 1 % shampoo   No No   Sig: Apply topically daily as needed for dandruff      Facility-Administered Medications Last Administration Doses Remaining   neomycin-bacitracin-polymyxin (NEOSPORIN) ointment None recorded           Past Medical History:   Diagnosis Date    ADHD (attention deficit hyperactivity disorder)     Atypical pervasive developmental disorder     Autism     Bipolar disorder (HCC)     Constipation     Depression     Head-banging     Last Assessed:  9/1/17    Hydronephrosis 9/19/2019    Hyperlipidemia     Hypertension     Intermittent explosive disorder     Oppositional defiant disorder     Personality disorder (HCC)     Schizophrenia (HCC)     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HCC)     Seizures (HCC)     Sleep disorder     Vitamin D insufficiency     Last Assessed:  6/22/17       Past Surgical History:   Procedure Laterality Date    NO PAST SURGERIES      UMBILICAL HERNIA REPAIR         History reviewed. No pertinent family history.  I have reviewed and agree with the history as documented.    E-Cigarette/Vaping    E-Cigarette Use Never User      E-Cigarette/Vaping Substances    Nicotine No     THC No     CBD No      Flavoring No     Other No     Unknown No      Social History     Tobacco Use    Smoking status: Never    Smokeless tobacco: Never   Vaping Use    Vaping status: Never Used   Substance Use Topics    Alcohol use: No    Drug use: No       Review of Systems    Physical Exam  Physical Exam    Vital Signs  ED Triage Vitals [04/15/24 1628]   Temperature Pulse Respirations Blood Pressure SpO2   98.4 °F (36.9 °C) (!) 113 17 (!) 149/111 95 %      Temp Source Heart Rate Source Patient Position - Orthostatic VS BP Location FiO2 (%)   Oral Monitor Sitting Right arm --      Pain Score       --           Vitals:    04/15/24 1628   BP: (!) 149/111   Pulse: (!) 113   Patient Position - Orthostatic VS: Sitting         Visual Acuity      ED Medications  Medications   divalproex sodium (DEPAKOTE ER) 24 hr tablet 1,500 mg (1,500 mg Oral Not Given 4/15/24 1745)   lithium carbonate (LITHOBID) CR tablet 900 mg (900 mg Oral Not Given 4/15/24 1745)   gabapentin (NEURONTIN) capsule 800 mg (800 mg Oral Not Given 4/15/24 1745)   haloperidol lactate (HALDOL) injection 5 mg (has no administration in time range)   haloperidol lactate (HALDOL) injection 5 mg (5 mg Intramuscular Given 4/15/24 1734)   LORazepam (ATIVAN) injection 2 mg (2 mg Intramuscular Given 4/15/24 1734)       Diagnostic Studies  Results Reviewed       None                   CT head without contrast    (Results Pending)              Procedures  Procedures         ED Course  ED Course as of 04/15/24 1908   Mon Apr 15, 2024   1741 Called to room due to patient's agitation.  Patient became suddenly agitated and began striking his head on various objects and became physically aggressive with staff.  Patient was restrained and given 5 mg Haldol and 2 mg Ativan IM.                               SBIRT 22yo+      Flowsheet Row Most Recent Value   Initial Alcohol Screen: US AUDIT-C     1. How often do you have a drink containing alcohol? 0 Filed at: 04/15/2024 1747   2. How many drinks  containing alcohol do you have on a typical day you are drinking?  0 Filed at: 04/15/2024 1747   3a. Male UNDER 65: How often do you have five or more drinks on one occasion? 0 Filed at: 04/15/2024 1747   Audit-C Score 0 Filed at: 04/15/2024 1747   ELPIDIO: How many times in the past year have you...    Used an illegal drug or used a prescription medication for non-medical reasons? Never Filed at: 04/15/2024 1747                      Medical Decision Making  Amount and/or Complexity of Data Reviewed  Radiology: ordered.    Risk  Prescription drug management.             Disposition  Final diagnoses:   None     ED Disposition       None          Follow-up Information    None         Patient's Medications   Discharge Prescriptions    No medications on file       No discharge procedures on file.    PDMP Review       None            ED Provider  Electronically Signed by

## 2024-04-26 ENCOUNTER — TELEPHONE (OUTPATIENT)
Dept: FAMILY MEDICINE CLINIC | Facility: CLINIC | Age: 39
End: 2024-04-26

## 2024-04-26 ENCOUNTER — OFFICE VISIT (OUTPATIENT)
Dept: FAMILY MEDICINE CLINIC | Facility: CLINIC | Age: 39
End: 2024-04-26

## 2024-04-26 VITALS
OXYGEN SATURATION: 98 % | TEMPERATURE: 99.8 F | DIASTOLIC BLOOD PRESSURE: 98 MMHG | WEIGHT: 249 LBS | SYSTOLIC BLOOD PRESSURE: 134 MMHG | RESPIRATION RATE: 18 BRPM | BODY MASS INDEX: 36.45 KG/M2 | HEART RATE: 101 BPM

## 2024-04-26 DIAGNOSIS — S09.90XD TRAUMATIC INJURY OF HEAD, SUBSEQUENT ENCOUNTER: Primary | ICD-10-CM

## 2024-04-26 PROCEDURE — 3075F SYST BP GE 130 - 139MM HG: CPT | Performed by: FAMILY MEDICINE

## 2024-04-26 PROCEDURE — 99213 OFFICE O/P EST LOW 20 MIN: CPT | Performed by: FAMILY MEDICINE

## 2024-04-26 PROCEDURE — 3080F DIAST BP >= 90 MM HG: CPT | Performed by: FAMILY MEDICINE

## 2024-04-26 RX ORDER — ACETAMINOPHEN 325 MG/1
TABLET ORAL
COMMUNITY
Start: 2024-03-28

## 2024-04-26 RX ORDER — LEVOMEFOLATE CALCIUM 15 MG
TABLET ORAL
COMMUNITY
Start: 2024-04-18

## 2024-04-26 RX ORDER — OLANZAPINE 20 MG/1
TABLET ORAL
COMMUNITY
Start: 2024-04-01

## 2024-04-26 RX ORDER — OLANZAPINE 15 MG/1
TABLET ORAL
COMMUNITY
Start: 2024-04-19
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
## 2024-04-26 NOTE — TELEPHONE ENCOUNTER
Folder (To be completed by) -Dr. Flaherty     Name of Form - medical exam casenote    Color folder (yellow)    Form to be Faxed (Fax #), Mailed (Address), or Picked up (By whom) -    Patient was made aware of the 7-10 business day form policy.
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
## 2024-05-21 ENCOUNTER — APPOINTMENT (OUTPATIENT)
Dept: LAB | Facility: CLINIC | Age: 39
End: 2024-05-21
Payer: COMMERCIAL

## 2024-05-21 DIAGNOSIS — Z79.899 ENCOUNTER FOR LONG-TERM (CURRENT) USE OF OTHER MEDICATIONS: ICD-10-CM

## 2024-05-21 LAB
ALBUMIN SERPL BCP-MCNC: 4.3 G/DL (ref 3.5–5)
ALP SERPL-CCNC: 51 U/L (ref 34–104)
ALT SERPL W P-5'-P-CCNC: 33 U/L (ref 7–52)
ANION GAP SERPL CALCULATED.3IONS-SCNC: 8 MMOL/L (ref 4–13)
AST SERPL W P-5'-P-CCNC: 22 U/L (ref 13–39)
BASOPHILS # BLD AUTO: 0.05 THOUSANDS/ÂΜL (ref 0–0.1)
BASOPHILS NFR BLD AUTO: 1 % (ref 0–1)
BILIRUB SERPL-MCNC: 0.28 MG/DL (ref 0.2–1)
BUN SERPL-MCNC: 13 MG/DL (ref 5–25)
CALCIUM SERPL-MCNC: 9.8 MG/DL (ref 8.4–10.2)
CHLORIDE SERPL-SCNC: 105 MMOL/L (ref 96–108)
CHOLEST SERPL-MCNC: 189 MG/DL
CO2 SERPL-SCNC: 30 MMOL/L (ref 21–32)
CREAT SERPL-MCNC: 0.87 MG/DL (ref 0.6–1.3)
EOSINOPHIL # BLD AUTO: 0.21 THOUSAND/ÂΜL (ref 0–0.61)
EOSINOPHIL NFR BLD AUTO: 3 % (ref 0–6)
ERYTHROCYTE [DISTWIDTH] IN BLOOD BY AUTOMATED COUNT: 15.7 % (ref 11.6–15.1)
GFR SERPL CREATININE-BSD FRML MDRD: 109 ML/MIN/1.73SQ M
GLUCOSE P FAST SERPL-MCNC: 84 MG/DL (ref 65–99)
HCT VFR BLD AUTO: 47.1 % (ref 36.5–49.3)
HDLC SERPL-MCNC: 49 MG/DL
HGB BLD-MCNC: 13.4 G/DL (ref 12–17)
IMM GRANULOCYTES # BLD AUTO: 0.04 THOUSAND/UL (ref 0–0.2)
IMM GRANULOCYTES NFR BLD AUTO: 1 % (ref 0–2)
LDLC SERPL CALC-MCNC: 105 MG/DL (ref 0–100)
LITHIUM SERPL-SCNC: 0.74 MMOL/L (ref 0.6–1.2)
LYMPHOCYTES # BLD AUTO: 2.14 THOUSANDS/ÂΜL (ref 0.6–4.47)
LYMPHOCYTES NFR BLD AUTO: 29 % (ref 14–44)
MCH RBC QN AUTO: 25 PG (ref 26.8–34.3)
MCHC RBC AUTO-ENTMCNC: 28.5 G/DL (ref 31.4–37.4)
MCV RBC AUTO: 88 FL (ref 82–98)
MONOCYTES # BLD AUTO: 0.84 THOUSAND/ÂΜL (ref 0.17–1.22)
MONOCYTES NFR BLD AUTO: 11 % (ref 4–12)
NEUTROPHILS # BLD AUTO: 4.14 THOUSANDS/ÂΜL (ref 1.85–7.62)
NEUTS SEG NFR BLD AUTO: 55 % (ref 43–75)
NONHDLC SERPL-MCNC: 140 MG/DL
NRBC BLD AUTO-RTO: 0 /100 WBCS
PLATELET # BLD AUTO: 253 THOUSANDS/UL (ref 149–390)
PMV BLD AUTO: 12.6 FL (ref 8.9–12.7)
POTASSIUM SERPL-SCNC: 4 MMOL/L (ref 3.5–5.3)
PROT SERPL-MCNC: 7.8 G/DL (ref 6.4–8.4)
RBC # BLD AUTO: 5.37 MILLION/UL (ref 3.88–5.62)
SODIUM SERPL-SCNC: 143 MMOL/L (ref 135–147)
T4 FREE SERPL-MCNC: 0.6 NG/DL (ref 0.61–1.12)
TRIGL SERPL-MCNC: 173 MG/DL
TSH SERPL DL<=0.05 MIU/L-ACNC: 0.82 UIU/ML (ref 0.45–4.5)
VALPROATE SERPL-MCNC: 75 UG/ML (ref 50–100)
WBC # BLD AUTO: 7.42 THOUSAND/UL (ref 4.31–10.16)

## 2024-05-21 PROCEDURE — 36415 COLL VENOUS BLD VENIPUNCTURE: CPT

## 2024-05-21 PROCEDURE — 84443 ASSAY THYROID STIM HORMONE: CPT

## 2024-05-21 PROCEDURE — 80178 ASSAY OF LITHIUM: CPT

## 2024-05-21 PROCEDURE — 85025 COMPLETE CBC W/AUTO DIFF WBC: CPT

## 2024-05-21 PROCEDURE — 80053 COMPREHEN METABOLIC PANEL: CPT

## 2024-05-21 PROCEDURE — 84439 ASSAY OF FREE THYROXINE: CPT

## 2024-05-21 PROCEDURE — 80061 LIPID PANEL: CPT

## 2024-05-21 PROCEDURE — 80164 ASSAY DIPROPYLACETIC ACD TOT: CPT

## 2024-06-11 DIAGNOSIS — K21.9 GASTROESOPHAGEAL REFLUX DISEASE: ICD-10-CM

## 2024-06-11 DIAGNOSIS — I10 ESSENTIAL HYPERTENSION: ICD-10-CM

## 2024-06-11 RX ORDER — AMLODIPINE BESYLATE 5 MG/1
5 TABLET ORAL DAILY
Qty: 30 TABLET | Refills: 3 | Status: SHIPPED | OUTPATIENT
Start: 2024-06-11

## 2024-06-11 RX ORDER — OMEPRAZOLE 20 MG/1
20 CAPSULE, DELAYED RELEASE ORAL DAILY
Qty: 30 CAPSULE | Refills: 3 | Status: SHIPPED | OUTPATIENT
Start: 2024-06-11

## 2024-07-02 ENCOUNTER — OFFICE VISIT (OUTPATIENT)
Dept: DENTISTRY | Facility: CLINIC | Age: 39
End: 2024-07-02

## 2024-07-02 VITALS — DIASTOLIC BLOOD PRESSURE: 89 MMHG | TEMPERATURE: 100 F | HEART RATE: 92 BPM | SYSTOLIC BLOOD PRESSURE: 117 MMHG

## 2024-07-02 DIAGNOSIS — K03.6 ACCRETIONS ON TEETH: ICD-10-CM

## 2024-07-02 DIAGNOSIS — K03.6 DENTAL CALCULUS: ICD-10-CM

## 2024-07-02 DIAGNOSIS — Z01.20 ENCOUNTER FOR DENTAL EXAMINATION: Primary | ICD-10-CM

## 2024-07-02 DIAGNOSIS — K02.9 DENTAL CARIES: ICD-10-CM

## 2024-07-02 PROCEDURE — D1110 PROPHYLAXIS - ADULT: HCPCS

## 2024-07-02 PROCEDURE — D1206 TOPICAL APPLICATION OF FLUORIDE VARNISH: HCPCS

## 2024-07-02 PROCEDURE — D0120 PERIODIC ORAL EVALUATION - ESTABLISHED PATIENT: HCPCS

## 2024-07-02 NOTE — DENTAL PROCEDURE DETAILS
PERIODIC EXAM, ADULT PROPHY , (no xrays due)   Patient arrived with caregivers   reviewed medical records  no change  He is using PREVIDENT    Encouraged better brushing upper molars ( advised to close mouth a little)    REVIEWED MED HX: meds, allergies, health changes reviewed in [facility]. All consents signed.  CHIEF CONCERN: sens # 21  only tooth in lower arch  PAIN SCALE:  3  ASA CLASS:  ASA 3 - Patient with moderate systemic disease with functional limitations  PLAQUE:  moderate  CALCULUS: Light  BLEEDING:  light  STAIN : Light     PERIO:     Hygiene Procedures:  Scaled, Polished, Flossed and Placement of Wonderful Fl Varnish    Oral Hygiene Instruction: Brushing minimum 2x daily for 2 minutes, daily flossing and OTC Fluoride rinse    Dispensed: Toothbrush, Toothpaste, and Flossers    Visual and Tactile Intraoral/ Extraoral evaluation: Oral and Oropharyngeal cancer evaluation. No findings     Dr. SMITH  Reviewed with patient clinical and radiographic findings and patient verbalized understanding. All questions and concerns addressed.     REFERRALS: None    CARIES FINDINGS:        TREATMENT  PLAN :   NV: SDF placement # 16 and # 21    Next Recall: 4 month recall        Last Panorex/ FMX : 3/24   Abdominal Pain, N/V/D

## 2024-07-05 DIAGNOSIS — L70.0 ACNE VULGARIS: ICD-10-CM

## 2024-07-05 RX ORDER — BENZOYL PEROXIDE 10 G/100G
SUSPENSION TOPICAL
Qty: 237 G | Refills: 5 | Status: SHIPPED | OUTPATIENT
Start: 2024-07-05

## 2024-07-15 DIAGNOSIS — E55.9 VITAMIN D DEFICIENCY: ICD-10-CM

## 2024-07-15 RX ORDER — CHOLECALCIFEROL (VITAMIN D3) 25 MCG
1000 TABLET ORAL DAILY
Qty: 90 TABLET | Refills: 0 | Status: SHIPPED | OUTPATIENT
Start: 2024-07-15 | End: 2024-07-23 | Stop reason: SDUPTHER

## 2024-07-23 ENCOUNTER — OFFICE VISIT (OUTPATIENT)
Dept: FAMILY MEDICINE CLINIC | Facility: CLINIC | Age: 39
End: 2024-07-23

## 2024-07-23 VITALS
OXYGEN SATURATION: 98 % | TEMPERATURE: 99 F | HEART RATE: 97 BPM | DIASTOLIC BLOOD PRESSURE: 91 MMHG | SYSTOLIC BLOOD PRESSURE: 128 MMHG | RESPIRATION RATE: 18 BRPM | BODY MASS INDEX: 36.56 KG/M2 | HEIGHT: 69 IN | WEIGHT: 246.8 LBS

## 2024-07-23 DIAGNOSIS — E55.9 VITAMIN D DEFICIENCY: ICD-10-CM

## 2024-07-23 DIAGNOSIS — L70.0 ACNE VULGARIS: ICD-10-CM

## 2024-07-23 DIAGNOSIS — I10 ESSENTIAL HYPERTENSION: ICD-10-CM

## 2024-07-23 DIAGNOSIS — L21.0 DANDRUFF: ICD-10-CM

## 2024-07-23 DIAGNOSIS — F20.9 SCHIZOPHRENIA, UNSPECIFIED TYPE (HCC): ICD-10-CM

## 2024-07-23 DIAGNOSIS — L30.9 DERMATITIS: ICD-10-CM

## 2024-07-23 DIAGNOSIS — I10 BENIGN ESSENTIAL HYPERTENSION: ICD-10-CM

## 2024-07-23 DIAGNOSIS — K21.9 GASTROESOPHAGEAL REFLUX DISEASE: ICD-10-CM

## 2024-07-23 DIAGNOSIS — F31.9 BIPOLAR 1 DISORDER (HCC): ICD-10-CM

## 2024-07-23 DIAGNOSIS — Z00.00 ANNUAL PHYSICAL EXAM: Primary | ICD-10-CM

## 2024-07-23 PROCEDURE — 99395 PREV VISIT EST AGE 18-39: CPT | Performed by: FAMILY MEDICINE

## 2024-07-23 PROCEDURE — 3080F DIAST BP >= 90 MM HG: CPT | Performed by: FAMILY MEDICINE

## 2024-07-23 PROCEDURE — 99213 OFFICE O/P EST LOW 20 MIN: CPT | Performed by: FAMILY MEDICINE

## 2024-07-23 PROCEDURE — 3074F SYST BP LT 130 MM HG: CPT | Performed by: FAMILY MEDICINE

## 2024-07-23 RX ORDER — SULFACETAMIDE SODIUM 100 MG/ML
LOTION TOPICAL DAILY
Qty: 118 ML | Refills: 5 | Status: SHIPPED | OUTPATIENT
Start: 2024-07-23

## 2024-07-23 RX ORDER — BENZOYL PEROXIDE 10 G/100G
SUSPENSION TOPICAL
Qty: 237 G | Refills: 5 | Status: SHIPPED | OUTPATIENT
Start: 2024-07-23

## 2024-07-23 RX ORDER — LEVOMEFOLATE CALCIUM 15 MG
15 TABLET ORAL DAILY
Qty: 90 TABLET | Refills: 1 | Status: SHIPPED | OUTPATIENT
Start: 2024-07-23

## 2024-07-23 RX ORDER — CHOLECALCIFEROL (VITAMIN D3) 25 MCG
1000 TABLET ORAL DAILY
Qty: 90 TABLET | Refills: 0 | Status: SHIPPED | OUTPATIENT
Start: 2024-07-23

## 2024-07-23 RX ORDER — AMLODIPINE BESYLATE 5 MG/1
5 TABLET ORAL DAILY
Qty: 30 TABLET | Refills: 3 | Status: SHIPPED | OUTPATIENT
Start: 2024-07-23

## 2024-07-23 RX ORDER — OMEPRAZOLE 20 MG/1
20 CAPSULE, DELAYED RELEASE ORAL DAILY
Qty: 30 CAPSULE | Refills: 3 | Status: SHIPPED | OUTPATIENT
Start: 2024-07-23

## 2024-07-23 RX ORDER — AMMONIUM LACTATE 12 G/100G
LOTION TOPICAL
Qty: 225 G | Refills: 0 | Status: SHIPPED | OUTPATIENT
Start: 2024-07-23

## 2024-07-23 NOTE — ASSESSMENT & PLAN NOTE
Maintained on Zyprexa via psychiatry.  Recent lipid panel showing some elevations in triglycerides and cholesterol however this is permissible and likely secondary to dietary choices or medication side effects.  Regardless, the level is still permissible at this time.  Continue with yearly lipid panel surveillance

## 2024-07-23 NOTE — PATIENT INSTRUCTIONS
"Patient Education     Routine physical for adults   The Basics   Written by the doctors and editors at [facility]   What is a physical? -- A physical is a routine visit, or \"check-up,\" with your doctor. You might also hear it called a \"wellness visit\" or \"preventive visit.\"  During each visit, the doctor will:   Ask about your physical and mental health   Ask about your habits, behaviors, and lifestyle   Do an exam   Give you vaccines if needed   Talk to you about any medicines you take   Give advice about your health   Answer your questions  Getting regular check-ups is an important part of taking care of your health. It can help your doctor find and treat any problems you have. But it's also important for preventing health problems.  A routine physical is different from a \"sick visit.\" A sick visit is when you see a doctor because of a health concern or problem. Since physicals are scheduled ahead of time, you can think about what you want to ask the doctor.  How often should I get a physical? -- It depends on your age and health. In general, for people age 21 years and older:   If you are younger than 50 years, you might be able to get a physical every 3 years.   If you are 50 years or older, your doctor might recommend a physical every year.  If you have an ongoing health condition, like diabetes or high blood pressure, your doctor will probably want to see you more often.  What happens during a physical? -- In general, each visit will include:   Physical exam - The doctor or nurse will check your height, weight, heart rate, and blood pressure. They will also look at your eyes and ears. They will ask about how you are feeling and whether you have any symptoms that bother you.   Medicines - It's a good idea to bring a list of all the medicines you take to each doctor visit. Your doctor will talk to you about your medicines and answer any questions. Tell them if you are having any side effects that bother you. You " "should also tell them if you are having trouble paying for any of your medicines.   Habits and behaviors - This includes:   Your diet   Your exercise habits   Whether you smoke, drink alcohol, or use drugs   Whether you are sexually active   Whether you feel safe at home  Your doctor will talk to you about things you can do to improve your health and lower your risk of health problems. They will also offer help and support. For example, if you want to quit smoking, they can give you advice and might prescribe medicines. If you want to improve your diet or get more physical activity, they can help you with this, too.   Lab tests, if needed - The tests you get will depend on your age and situation. For example, your doctor might want to check your:   Cholesterol   Blood sugar   Iron level   Vaccines - The recommended vaccines will depend on your age, health, and what vaccines you already had. Vaccines are very important because they can prevent certain serious or deadly infections.   Discussion of screening - \"Screening\" means checking for diseases or other health problems before they cause symptoms. Your doctor can recommend screening based on your age, risk, and preferences. This might include tests to check for:   Cancer, such as breast, prostate, cervical, ovarian, colorectal, prostate, lung, or skin cancer   Sexually transmitted infections, such as chlamydia and gonorrhea   Mental health conditions like depression and anxiety  Your doctor will talk to you about the different types of screening tests. They can help you decide which screenings to have. They can also explain what the results might mean.   Answering questions - The physical is a good time to ask the doctor or nurse questions about your health. If needed, they can refer you to other doctors or specialists, too.  Adults older than 65 years often need other care, too. As you get older, your doctor will talk to you about:   How to prevent falling at " home   Hearing or vision tests   Memory testing   How to take your medicines safely   Making sure that you have the help and support you need at home  All topics are updated as new evidence becomes available and our peer review process is complete.  This topic retrieved from [employer] on: May 02, 2024.  Topic 521353 Version 1.0  Release: 32.4.3 - C32.122  © 2024 UpToDate, Inc. and/or its affiliates. All rights reserved.  Consumer Information Use and Disclaimer   Disclaimer: This generalized information is a limited summary of diagnosis, treatment, and/or medication information. It is not meant to be comprehensive and should be used as a tool to help the user understand and/or assess potential diagnostic and treatment options. It does NOT include all information about conditions, treatments, medications, side effects, or risks that may apply to a specific patient. It is not intended to be medical advice or a substitute for the medical advice, diagnosis, or treatment of a health care provider based on the health care provider's examination and assessment of a patient's specific and unique circumstances. Patients must speak with a health care provider for complete information about their health, medical questions, and treatment options, including any risks or benefits regarding use of medications. This information does not endorse any treatments or medications as safe, effective, or approved for treating a specific patient. Up[employer]Date, Inc. and its affiliates disclaim any warranty or liability relating to this information or the use thereof.The use of this information is governed by the Terms of Use, available at https://www.woltersDwellableuwer.com/en/know/clinical-effectiveness-terms. 2024© UpToDate, Inc. and its affiliates and/or licensors. All rights reserved.  Copyright   © 2024 Up[employer]Date, [employer]. and/or its affiliates. All rights reserved.

## 2024-07-23 NOTE — PROGRESS NOTES
Adult Annual Physical  Name: Delmer Giordano      : 1985      MRN: [id number]  Encounter Provider: Sonny Galdamez DO  Encounter Date: 2024   Encounter department: [facility]    Assessment & Plan   1. Annual physical exam  Assessment & Plan:  Pt states he is doing well at baseline state of health and without any acute concerns or questions at this visit.  - Screening for cardiovascular disease: Recent metabolic pannel and lipid panel on file done in may.   - Smoking cessation: pt is a never smoker  - HIV and Hep C screenings: WNL  - Vaccinations: UTD   - Depression screening: PHQ score 0 (24), neg  - Counseled the patient on healthy lifestyle habits including weight loss, diet, and exercise.     BMI Counseling: Body mass index is 36.13 kg/m². The BMI is above normal. Nutrition recommendations include reducing portion sizes, 3-5 servings of fruits/vegetables daily, consuming healthier snacks, decreasing soda and/or juice intake, moderation in carbohydrate intake, and increasing intake of lean protein. Exercise recommendations include moderate aerobic physical activity for 150 minutes/week.     2. Bipolar 1 disorder (HCC)  Assessment & Plan:  Maintained on lithium per psychiatry. Recent level within goal. Continuing monitoring per psychiatry   Orders:  -     TSH, 3rd generation with Free T4 reflex; Future  -     L-Methylfolate 15 MG TABS; Take 1 tablet (15 mg total) by mouth in the morning  3. Schizophrenia, unspecified type (HCC)  Assessment & Plan:  Maintained on Zyprexa via psychiatry.  Recent lipid panel showing some elevations in triglycerides and cholesterol however this is permissible and likely secondary to dietary choices or medication side effects.  Regardless, the level is still permissible at this time.  Continue with yearly lipid panel surveillance  Orders:  -     TSH, 3rd generation with Free T4 reflex; Future  4. Benign essential  hypertension  Assessment & Plan:  Controlled.  On amlodipine 5 mg refill given today.  5. BMI 36.0-36.9,adult  6. Dermatitis  -     ammonium lactate (LAC-HYDRIN) 12 % lotion; APPLY TOP. TO AFF. AREA ON SKIN TWICE DAILY AS NEEDED (ECZEMA) *BARRIENTOS  7. Acne vulgaris  -     benzoyl peroxide 10 % LIQD; USE TO WASH FACE/ SHOULDERS/BACK As needed  (ACNE)*STOP USING IF EXCESSIVE IRRIT/RED  -     Sulfacetamide Sodium, Acne, 10 % LOTN; Apply topically in the morning  8. Dandruff  -     pyrithione zinc (HEAD AND SHOULDERS) 1 % shampoo; Apply topically daily as needed for dandruff  9. Essential hypertension  -     amLODIPine (NORVASC) 5 mg tablet; Take 1 tablet (5 mg total) by mouth daily  10. Vitamin D deficiency  -     cholecalciferol (VITAMIN D3) 1,000 units tablet; Take 1 tablet (1,000 Units total) by mouth daily  11. Gastroesophageal reflux disease  -     omeprazole (PriLOSEC) 20 mg delayed release capsule; Take 1 capsule (20 mg total) by mouth daily    Immunizations and preventive care screenings were discussed with patient today. Appropriate education was printed on patient's after visit summary.    Counseling:  Dental Health: discussed importance of regular tooth brushing, flossing, and dental visits.  Exercise: the importance of regular exercise/physical activity was discussed. Recommend exercise 3-5 times per week for at least 30 minutes.          History of Present Illness       39-year-old male history of bipolar 1 disorder on lithium with schizophrenia on Zyprexa followed closely by psychiatry.  Presenting today for annual physical exam.  He is up-to-date with his immunizations and not yet due for colonoscopy given his age.  Review of systems largely negative with no current concerns today despite some small dental pain however caregiver in the room stating that they are visiting the dentist next week.  No fevers or other systemic signs of infection no swelling in the mouth.  Otherwise doing quite well.  Also  requesting some medication refills today.    Adult Annual Physical:  Patient presents for annual physical.     Diet and Physical Activity:  - Diet/Nutrition: well balanced diet.  - Exercise: walking, 1-2 times a week on average and moderate cardiovascular exercise.    Depression Screening:    - PHQ-9 Score: 0    General Health:  - Sleep: sleeps well and 7-8 hours of sleep on average.  - Hearing: normal hearing bilateral ears.  - Vision: goes for regular eye exams.  - Dental: regular dental visits and brushes teeth twice daily.     Health:  - History of STDs: no.   - Urinary symptoms: none.       Alcohol/tobacco/drugs:  Sexual health:  Review of Systems   Constitutional:  Negative for activity change, fatigue, fever and unexpected weight change.   HENT:  Negative for congestion, rhinorrhea and sore throat.    Eyes:  Negative for visual disturbance.   Respiratory:  Negative for cough, chest tightness, shortness of breath and wheezing.    Cardiovascular:  Negative for chest pain, palpitations and leg swelling.   Gastrointestinal:  Negative for abdominal pain, constipation, diarrhea, nausea and vomiting.   Endocrine: Negative for polydipsia, polyphagia and polyuria.   Genitourinary:  Negative for decreased urine volume, difficulty urinating and urgency.   Musculoskeletal:  Negative for arthralgias, back pain, gait problem and joint swelling.   Skin:  Negative for rash.   Neurological:  Negative for dizziness, seizures, weakness, light-headedness, numbness and headaches.   Psychiatric/Behavioral:  Negative for behavioral problems and confusion.      Current Outpatient Medications on File Prior to Visit   Medication Sig Dispense Refill    acetaminophen (TYLENOL) 325 mg tablet       cetirizine (ZyrTEC) 5 MG chewable tablet Chew 1 tablet (5 mg total) daily at bedtime as needed for allergies 30 tablet 5    divalproex sodium (DEPAKOTE ER) 500 mg 24 hr tablet Take 3 tablets (1500 mg total) by mouth once daily at 5 pm 30  tablet 6    docusate sodium (COLACE) 100 mg capsule Take 1 capsule (100 mg total) by mouth 2 (two) times a day as needed for constipation 60 capsule 12    gabapentin (NEURONTIN) 800 mg tablet Take 1 tablet (800 mg total) by mouth 3 times daily at 8 am, 4 pm, and 8 pm 90 tablet 5    ibuprofen (MOTRIN) 600 mg tablet Take 1 tablet (600 mg total) by mouth every 6 (six) hours as needed for mild pain 30 tablet 3    lanolin-mineral oil (BABY OIL) OIL Apply topically if needed for dry skin Use as needed for dry scalp 591 mL 3    lithium carbonate (LITHOBID) 450 mg CR tablet Take 2 tablets (900 mg total) by mouth daily at 5:30 pm  0    LORazepam (ATIVAN) 1 mg tablet Take 1 mg by mouth 2 (two) times a day       Mouthwashes (Listerine Antiseptic) LIQD Apply 10 mL to the mouth or throat daily 250 mL 3    OLANZapine (ZyPREXA) 10 mg tablet       OLANZapine (ZyPREXA) 15 mg tablet       OLANZapine (ZyPREXA) 20 MG tablet       phenol (CHLORASEPTIC) 1.4 % mucosal liquid Apply 1 spray to the mouth or throat every 2 (two) hours as needed (sore throat) 118 mL 5    polyethylene glycol (MIRALAX) 17 g packet Take 17 g by mouth 2 (two) times a day as needed (for constipation) To be used as needed for constipation and hard stools 20 each 1    Sodium Fluoride (PreviDent 5000 Booster Plus) 1.1 % PSTE Pea sized amount to brush at bedtime 100 mL 0    SODIUM FLUORIDE, DENTAL RINSE, (Listerine Smart Rinse) 0.02 % SOLN Swish and spit one capful in mouth before bed daily. 400 mL 0    Sunscreens (Sunblock SPF30) LOTN Apply every 2 hours up to five times daily 89 mL 5    [DISCONTINUED] amLODIPine (NORVASC) 5 mg tablet Take 1 tablet (5 mg total) by mouth daily 30 tablet 3    [DISCONTINUED] ammonium lactate (LAC-HYDRIN) 12 % lotion APPLY TOP. TO AFF. AREA ON SKIN TWICE DAILY AS NEEDED (ECZEMA) *BARRIENTOS 225 g 0    [DISCONTINUED] benzoyl peroxide 10 % LIQD USE TO WASH FACE/ SHOULDERS/BACK As needed  (ACNE)*STOP USING IF EXCESSIVE IRRIT/ g 5     [DISCONTINUED] cholecalciferol (VITAMIN D3) 1,000 units tablet Take 1 tablet (1,000 Units total) by mouth daily 90 tablet 0    [DISCONTINUED] fluticasone (FLONASE) 50 mcg/act nasal spray 2 sprays into each nostril daily as needed for rhinitis Please take 2 spray, one in each nostril, daily as needed for runny nose and nasal congestion. 16 g 5    [DISCONTINUED] L-Methylfolate 15 MG TABS       [DISCONTINUED] omeprazole (PriLOSEC) 20 mg delayed release capsule Take 1 capsule (20 mg total) by mouth daily 30 capsule 3    [DISCONTINUED] pyrithione zinc (HEAD AND SHOULDERS) 1 % shampoo Apply topically daily as needed for dandruff 240 mL 5    [DISCONTINUED] Sulfacetamide Sodium, Acne, 10 % LOTN Apply topically in the morning 118 mL 5    bismuth subsalicylate (PEPTO BISMOL) 524 mg/30 mL oral suspension Take 15 mL (262 mg total) by mouth every 6 (six) hours as needed for indigestion or diarrhea 360 mL 5    carbamide peroxide (DEBROX) 6.5 % otic solution Administer 5 drops into both ears 2 (two) times a day for 4 days 15 mL 0    dextromethorphan-guaiFENesin (ROBITUSSIN DM)  mg/5 mL syrup Take 5 mL by mouth 3 (three) times a day as needed for cough or congestion (Patient not taking: Reported on 7/2/2024) 118 mL 5    Neomycin-Bacitracin-Polymyxin (First Aid Antibiotic) 3.5-400-5000 MG-UNIT OINT Apply 3.5 mg topically 2 (two) times a day as needed (topical on scalp lacertation) Apply BID PRN for wound care 28 g 1    OLANZapine (ZyPREXA) 5 mg tablet  (Patient not taking: Reported on 4/26/2024)      [DISCONTINUED] Camphor-Eucalyptus-Menthol (Vicks VapoRub) 4.7-1.2-2.6 % OINT Apply topically 2 (two) times a day as needed (PRN) 50 g 5    [DISCONTINUED] L-Methylfolate-Algae (Deplin 15) 15-90.314 MG CAPS Take 15 mg by mouth daily Take 1 tablet by mouth once daily at 8AM (Patient not taking: Reported on 7/23/2024) 30 capsule 3     Current Facility-Administered Medications on File Prior to Visit   Medication Dose Route Frequency  "Provider Last Rate Last Admin    neomycin-bacitracin-polymyxin (NEOSPORIN) ointment   Topical TID PRN Lorie Little Yext, DO            Objective     /91 (BP Location: Right arm, Patient Position: Sitting, Cuff Size: Standard)   Pulse 97   Temp 99 °F (37.2 °C) (Temporal)   Resp 18   Ht 5' 9.3\" (1.76 m)   Wt 112 kg (246 lb 12.8 oz)   SpO2 98%   BMI 36.13 kg/m²     Physical Exam  Constitutional:       General: He is not in acute distress.     Appearance: Normal appearance.   HENT:      Head: Normocephalic and atraumatic.      Right Ear: Tympanic membrane, ear canal and external ear normal. There is impacted cerumen.      Left Ear: Tympanic membrane, ear canal and external ear normal. There is impacted cerumen.      Mouth/Throat:      Mouth: Mucous membranes are moist.      Pharynx: No oropharyngeal exudate or posterior oropharyngeal erythema.   Eyes:      General: No scleral icterus.     Conjunctiva/sclera: Conjunctivae normal.   Cardiovascular:      Rate and Rhythm: Normal rate and regular rhythm.      Pulses: Normal pulses.      Heart sounds: Normal heart sounds. No murmur heard.     No friction rub. No gallop.   Pulmonary:      Effort: Pulmonary effort is normal. No respiratory distress.      Breath sounds: Normal breath sounds. No wheezing.   Abdominal:      General: Abdomen is flat.      Palpations: Abdomen is soft.      Tenderness: There is no abdominal tenderness.   Musculoskeletal:         General: No swelling or tenderness. Normal range of motion.      Cervical back: Normal range of motion and neck supple.   Skin:     General: Skin is warm and dry.      Capillary Refill: Capillary refill takes less than 2 seconds.   Neurological:      Mental Status: He is alert. Mental status is at baseline.   Psychiatric:         Mood and Affect: Mood normal.       Administrative Statements   I have spent a total time of 30 minutes in caring for this patient on the day of the visit/encounter including Diagnostic " results, Risks and benefits of tx options, Instructions for management, Patient and family education, Importance of tx compliance, Impressions, Documenting in the medical record, and Reviewing / ordering tests, medicine, procedures  .    Sonny Galdamez DO  07/23/24, 2:09 PM

## 2024-07-23 NOTE — ASSESSMENT & PLAN NOTE
Maintained on lithium per psychiatry. Recent level within goal. Continuing monitoring per psychiatry

## 2024-07-23 NOTE — ASSESSMENT & PLAN NOTE
Pt states he is doing well at baseline state of health and without any acute concerns or questions at this visit.  - Screening for cardiovascular disease: Recent metabolic pannel and lipid panel on file done in may.   - Smoking cessation: pt is a never smoker  - HIV and Hep C screenings: WNL  - Vaccinations: UTD   - Depression screening: PHQ score 0 (07/23/24), neg  - Counseled the patient on healthy lifestyle habits including weight loss, diet, and exercise.     BMI Counseling: Body mass index is 36.13 kg/m². The BMI is above normal. Nutrition recommendations include reducing portion sizes, 3-5 servings of fruits/vegetables daily, consuming healthier snacks, decreasing soda and/or juice intake, moderation in carbohydrate intake, and increasing intake of lean protein. Exercise recommendations include moderate aerobic physical activity for 150 minutes/week.

## 2024-07-24 ENCOUNTER — TELEPHONE (OUTPATIENT)
Dept: FAMILY MEDICINE CLINIC | Facility: CLINIC | Age: 39
End: 2024-07-24

## 2024-07-30 ENCOUNTER — APPOINTMENT (OUTPATIENT)
Dept: LAB | Facility: CLINIC | Age: 39
End: 2024-07-30
Payer: COMMERCIAL

## 2024-07-30 DIAGNOSIS — F20.9 SCHIZOPHRENIA, UNSPECIFIED TYPE (HCC): ICD-10-CM

## 2024-07-30 DIAGNOSIS — F31.9 BIPOLAR 1 DISORDER (HCC): ICD-10-CM

## 2024-07-30 LAB — TSH SERPL DL<=0.05 MIU/L-ACNC: 0.76 UIU/ML (ref 0.45–4.5)

## 2024-07-30 PROCEDURE — 36415 COLL VENOUS BLD VENIPUNCTURE: CPT

## 2024-07-30 PROCEDURE — 84443 ASSAY THYROID STIM HORMONE: CPT

## 2024-08-16 ENCOUNTER — OFFICE VISIT (OUTPATIENT)
Dept: FAMILY MEDICINE CLINIC | Facility: CLINIC | Age: 39
End: 2024-08-16

## 2024-08-16 VITALS
OXYGEN SATURATION: 99 % | WEIGHT: 252.6 LBS | RESPIRATION RATE: 18 BRPM | HEART RATE: 92 BPM | BODY MASS INDEX: 37.41 KG/M2 | DIASTOLIC BLOOD PRESSURE: 94 MMHG | TEMPERATURE: 99.3 F | HEIGHT: 69 IN | SYSTOLIC BLOOD PRESSURE: 129 MMHG

## 2024-08-16 DIAGNOSIS — F80.9 COMMUNICATION DISABILITY: ICD-10-CM

## 2024-08-16 DIAGNOSIS — G47.00 INSOMNIA: ICD-10-CM

## 2024-08-16 DIAGNOSIS — Z09 HOSPITAL DISCHARGE FOLLOW-UP: ICD-10-CM

## 2024-08-16 DIAGNOSIS — S09.90XD TRAUMATIC INJURY OF HEAD, SUBSEQUENT ENCOUNTER: Primary | ICD-10-CM

## 2024-08-16 RX ORDER — GABAPENTIN 600 MG/1
TABLET ORAL
Qty: 270 TABLET | Refills: 0 | Status: SHIPPED | OUTPATIENT
Start: 2024-08-16

## 2024-08-16 NOTE — ASSESSMENT & PLAN NOTE
Patient has difficulty speaking clearly.  His requested that he be allowed to pursue home speech therapy

## 2024-08-16 NOTE — PROGRESS NOTES
Ambulatory Visit  Name: Delmer Giordano      : 1985      MRN: [id number]  Encounter Provider: Pool Tavera MD  Encounter Date: 2024   Encounter department: [facility]    Assessment & Plan   1. Hospital discharge follow-up  Assessment & Plan:  Patient recently visited the emergency department due to self-injurious behavior.  The patient hit his forehead multiple times against the windshield of the car causing a small laceration and hematoma.  He did not require stitches in the emergency department.  However there is still a small open cut.  The staff that is healing well.  They are covering it with bacitracin daily.  Continue plan of care and follow-up if they notice any discharge swelling erythema or fevers.  2. Communication disability  Assessment & Plan:  Patient has difficulty speaking clearly.  His requested that he be allowed to pursue home speech therapy  Orders:  -     Ambulatory Referral to Speech Therapy; Future  3. Insomnia  -     gabapentin (NEURONTIN) 600 MG tablet; Take 1 tablet (800 mg total) by mouth 3 times daily at 8 am, 4 pm, and 8 pm       History of Present Illness     HPI  Delmer Giordano 39 y.o. Went to Ed recently for self sustained head injury. Patient only had a small laceration that requiring stitches. Appropriately dressed at this visit with bacitracin. Improved per staff. No discoloration at the site of injury. Mild swelling on the forehead. Staff requests for asssistance with  speech therapy     Review of Systems   Unable to perform ROS: Psychiatric disorder     Current Outpatient Medications on File Prior to Visit   Medication Sig Dispense Refill    acetaminophen (TYLENOL) 325 mg tablet       amLODIPine (NORVASC) 5 mg tablet Take 1 tablet (5 mg total) by mouth daily 30 tablet 3    ammonium lactate (LAC-HYDRIN) 12 % lotion APPLY TOP. TO AFF. AREA ON SKIN TWICE DAILY AS NEEDED (ECZEMA) *BARRIENTOS 225 g 0    bismuth subsalicylate  (PEPTO BISMOL) 524 mg/30 mL oral suspension Take 15 mL (262 mg total) by mouth every 6 (six) hours as needed for indigestion or diarrhea 360 mL 5    cetirizine (ZyrTEC) 5 MG chewable tablet Chew 1 tablet (5 mg total) daily at bedtime as needed for allergies 30 tablet 5    cholecalciferol (VITAMIN D3) 1,000 units tablet Take 1 tablet (1,000 Units total) by mouth daily 90 tablet 0    divalproex sodium (DEPAKOTE ER) 500 mg 24 hr tablet Take 3 tablets (1500 mg total) by mouth once daily at 5 pm 30 tablet 6    docusate sodium (COLACE) 100 mg capsule Take 1 capsule (100 mg total) by mouth 2 (two) times a day as needed for constipation 60 capsule 12    ibuprofen (MOTRIN) 600 mg tablet Take 1 tablet (600 mg total) by mouth every 6 (six) hours as needed for mild pain 30 tablet 3    L-Methylfolate 15 MG TABS Take 1 tablet (15 mg total) by mouth in the morning 90 tablet 1    lanolin-mineral oil (BABY OIL) OIL Apply topically if needed for dry skin Use as needed for dry scalp 591 mL 3    lithium carbonate (LITHOBID) 450 mg CR tablet Take 2 tablets (900 mg total) by mouth daily at 5:30 pm  0    LORazepam (ATIVAN) 1 mg tablet Take 1 mg by mouth 3 (three) times a day      Mouthwashes (Listerine Antiseptic) LIQD Apply 10 mL to the mouth or throat daily 250 mL 3    Neomycin-Bacitracin-Polymyxin (First Aid Antibiotic) 3.5-400-5000 MG-UNIT OINT Apply 3.5 mg topically 2 (two) times a day as needed (topical on scalp lacertation) Apply BID PRN for wound care 28 g 1    OLANZapine (ZyPREXA) 10 mg tablet       OLANZapine (ZyPREXA) 15 mg tablet       omeprazole (PriLOSEC) 20 mg delayed release capsule Take 1 capsule (20 mg total) by mouth daily 30 capsule 3    phenol (CHLORASEPTIC) 1.4 % mucosal liquid Apply 1 spray to the mouth or throat every 2 (two) hours as needed (sore throat) 118 mL 5    polyethylene glycol (MIRALAX) 17 g packet Take 17 g by mouth 2 (two) times a day as needed (for constipation) To be used as needed for constipation  "and hard stools 20 each 1    pyrithione zinc (HEAD AND SHOULDERS) 1 % shampoo Apply topically daily as needed for dandruff 240 mL 5    Sodium Fluoride (PreviDent 5000 Booster Plus) 1.1 % PSTE Pea sized amount to brush at bedtime 100 mL 0    SODIUM FLUORIDE, DENTAL RINSE, (Listerine Smart Rinse) 0.02 % SOLN Swish and spit one capful in mouth before bed daily. 400 mL 0    Sulfacetamide Sodium, Acne, 10 % LOTN Apply topically in the morning 118 mL 5    Sunscreens (Sunblock SPF30) LOTN Apply every 2 hours up to five times daily 89 mL 5    [DISCONTINUED] gabapentin (NEURONTIN) 800 mg tablet Take 1 tablet (800 mg total) by mouth 3 times daily at 8 am, 4 pm, and 8 pm 90 tablet 5    benzoyl peroxide 10 % LIQD USE TO WASH FACE/ SHOULDERS/BACK As needed  (ACNE)*STOP USING IF EXCESSIVE IRRIT/ g 5    carbamide peroxide (DEBROX) 6.5 % otic solution Administer 5 drops into both ears 2 (two) times a day for 4 days (Patient not taking: Reported on 8/16/2024) 15 mL 0    dextromethorphan-guaiFENesin (ROBITUSSIN DM)  mg/5 mL syrup Take 5 mL by mouth 3 (three) times a day as needed for cough or congestion (Patient not taking: Reported on 7/2/2024) 118 mL 5    OLANZapine (ZyPREXA) 20 MG tablet  (Patient not taking: Reported on 8/16/2024)      OLANZapine (ZyPREXA) 5 mg tablet  (Patient not taking: Reported on 4/26/2024)       Current Facility-Administered Medications on File Prior to Visit   Medication Dose Route Frequency Provider Last Rate Last Admin    neomycin-bacitracin-polymyxin (NEOSPORIN) ointment   Topical TID PRN Lorie Lawrence,           Objective     /94 (BP Location: Right arm, Patient Position: Sitting, Cuff Size: Standard)   Pulse 92   Temp 99.3 °F (37.4 °C) (Temporal)   Resp 18   Ht 5' 9.3\" (1.76 m)   Wt 115 kg (252 lb 9.6 oz)   SpO2 99%   BMI 36.98 kg/m²     Physical Exam  Vitals and nursing note reviewed.   Constitutional:       General: He is not in acute distress.     Appearance: He is " well-developed.   HENT:      Head: Normocephalic and atraumatic.   Eyes:      Conjunctiva/sclera: Conjunctivae normal.   Cardiovascular:      Rate and Rhythm: Normal rate and regular rhythm.      Heart sounds: No murmur heard.  Pulmonary:      Effort: Pulmonary effort is normal. No respiratory distress.      Breath sounds: Normal breath sounds.   Abdominal:      Palpations: Abdomen is soft.      Tenderness: There is no abdominal tenderness.   Musculoskeletal:         General: No swelling.      Cervical back: Neck supple.   Skin:     General: Skin is warm and dry.      Capillary Refill: Capillary refill takes less than 2 seconds.      Comments: The laceration on the anterior for the forhead and bacitracin   Neurological:      Mental Status: He is alert.   Psychiatric:         Mood and Affect: Mood normal.       Administrative Statements

## 2024-08-16 NOTE — ASSESSMENT & PLAN NOTE
Patient recently visited the emergency department due to self-injurious behavior.  The patient hit his forehead multiple times against the windshield of the car causing a small laceration and hematoma.  He did not require stitches in the emergency department.  However there is still a small open cut.  The staff that is healing well.  They are covering it with bacitracin daily.  Continue plan of care and follow-up if they notice any discharge swelling erythema or fevers.

## 2024-08-21 NOTE — ASSESSMENT & PLAN NOTE
Patient was seen following recent ED for head trauma. He is much improved and healing well. Health Aids are still applying bacitracin as necessary over a small laceration that is mostly healed. No substantial hematoma present    Continue current plan of care

## 2024-11-01 DIAGNOSIS — E55.9 VITAMIN D DEFICIENCY: ICD-10-CM

## 2024-11-01 RX ORDER — CHOLECALCIFEROL (VITAMIN D3) 25 MCG
TABLET ORAL
Qty: 31 TABLET | Refills: 0 | Status: SHIPPED | OUTPATIENT
Start: 2024-11-01

## 2024-11-04 ENCOUNTER — OFFICE VISIT (OUTPATIENT)
Dept: DENTISTRY | Facility: CLINIC | Age: 39
End: 2024-11-04

## 2024-11-04 DIAGNOSIS — K08.9 POOR DENTITION: ICD-10-CM

## 2024-11-04 DIAGNOSIS — K03.6 ACCRETIONS ON TEETH: Primary | ICD-10-CM

## 2024-11-04 PROCEDURE — D0120 PERIODIC ORAL EVALUATION - ESTABLISHED PATIENT: HCPCS | Performed by: DENTIST

## 2024-11-04 PROCEDURE — D1110 PROPHYLAXIS - ADULT: HCPCS

## 2024-11-04 RX ORDER — SODIUM FLUORIDE 6 MG/ML
PASTE, DENTIFRICE DENTAL
Qty: 100 ML | Refills: 2 | Status: SHIPPED | OUTPATIENT
Start: 2024-11-04 | End: 2024-11-04

## 2024-11-04 RX ORDER — SODIUM FLUORIDE 6 MG/ML
PASTE, DENTIFRICE DENTAL
Qty: 100 ML | Refills: 2 | Status: SHIPPED | OUTPATIENT
Start: 2024-11-04

## 2024-11-04 NOTE — DENTAL PROCEDURE DETAILS
PERIODIC EXAM, ADULT PROPHY , (no xrays due)   REVIEWED MED HX: meds, allergies, health changes reviewed in Psychiatric. All consents signed.  CHIEF CONCERN: care giver states he sometimes says teeth hurt when having teeth brushed  PAIN SCALE:  0  ASA CLASS:  ASA 3 - Patient with moderate systemic disease with functional limitations  PLAQUE:  mild  CALCULUS: Light  BLEEDING:  none  STAIN : None     PERIO:     Hygiene Procedures:  Scaled, Polished, Flossed    Oral Hygiene Instruction: Brushing minimum 2x daily for 2 minutes, daily flossing, OTC Fluoride rinse, and Recommended soft toothbrush only    Dispensed: Toothbrush, Toothpaste, and Flossers    Visual and Tactile Intraoral/ Extraoral evaluation: Oral and Oropharyngeal cancer evaluation. No findings     Dr. CHIU Reviewed with patient clinical and radiographic findings and patient verbalized understanding. All questions and concerns addressed.     REFERRALS: None    CARIES FINDINGS:  as noted  needs SDF # 16, 21       TREATMENT  PLAN :   [place]:     CARLOS SMITH  Next Recall: 4  month recall     Last BWX:   Last Panorex/ FMX : pano 6/2020   attempt pano next recall    Gave RX for PREVIDENT

## 2024-12-02 DIAGNOSIS — K21.9 GASTROESOPHAGEAL REFLUX DISEASE: ICD-10-CM

## 2024-12-02 DIAGNOSIS — I10 ESSENTIAL HYPERTENSION: ICD-10-CM

## 2024-12-03 RX ORDER — AMLODIPINE BESYLATE 5 MG/1
TABLET ORAL
Qty: 31 TABLET | Refills: 0 | Status: SHIPPED | OUTPATIENT
Start: 2024-12-03 | End: 2024-12-09

## 2024-12-06 DIAGNOSIS — K59.04 CHRONIC IDIOPATHIC CONSTIPATION: ICD-10-CM

## 2024-12-06 DIAGNOSIS — J02.9 SORE THROAT: ICD-10-CM

## 2024-12-08 RX ORDER — POLYETHYLENE GLYCOL 3350 17 G/17G
17 POWDER, FOR SOLUTION ORAL 2 TIMES DAILY PRN
Qty: 20 EACH | Refills: 1 | Status: SHIPPED | OUTPATIENT
Start: 2024-12-08

## 2024-12-09 ENCOUNTER — TELEPHONE (OUTPATIENT)
Dept: FAMILY MEDICINE CLINIC | Facility: CLINIC | Age: 39
End: 2024-12-09

## 2024-12-09 ENCOUNTER — OFFICE VISIT (OUTPATIENT)
Dept: FAMILY MEDICINE CLINIC | Facility: CLINIC | Age: 39
End: 2024-12-09

## 2024-12-09 VITALS
DIASTOLIC BLOOD PRESSURE: 96 MMHG | RESPIRATION RATE: 18 BRPM | SYSTOLIC BLOOD PRESSURE: 143 MMHG | OXYGEN SATURATION: 98 % | HEART RATE: 86 BPM | TEMPERATURE: 99.7 F | BODY MASS INDEX: 36.45 KG/M2 | WEIGHT: 249 LBS

## 2024-12-09 DIAGNOSIS — I10 ESSENTIAL HYPERTENSION: ICD-10-CM

## 2024-12-09 DIAGNOSIS — I10 BENIGN ESSENTIAL HYPERTENSION: Primary | ICD-10-CM

## 2024-12-09 PROCEDURE — 3077F SYST BP >= 140 MM HG: CPT | Performed by: FAMILY MEDICINE

## 2024-12-09 PROCEDURE — 99213 OFFICE O/P EST LOW 20 MIN: CPT | Performed by: FAMILY MEDICINE

## 2024-12-09 PROCEDURE — 3080F DIAST BP >= 90 MM HG: CPT | Performed by: FAMILY MEDICINE

## 2024-12-09 RX ORDER — AMLODIPINE BESYLATE 10 MG/1
10 TABLET ORAL DAILY
Qty: 30 TABLET | Refills: 3 | Status: SHIPPED | OUTPATIENT
Start: 2024-12-09

## 2024-12-09 NOTE — PROGRESS NOTES
Name: Delmer Giordano      : 1985      MRN: [id number]  Encounter Provider: Lorie Lawrence DO  Encounter Date: 2024   Encounter department: [facility] [place]  :  Assessment & Plan  Benign essential hypertension  Previously controlled on amlodipine 5 mg daily but patient's caretakers have noted that it has been increasingly elevated at the group home, elevated today as well at 143/96.  On repeat it was 139/98.  Caretaker states that on average at home it is greater than 130/80.    Plan:  Will increase amlodipine to 10 mg daily today and follow-up in 1 week for blood pressure recheck       Essential hypertension    Orders:    amLODIPine (NORVASC) 10 mg tablet; Take 1 tablet (10 mg total) by mouth daily           History of Present Illness     Delmer is a 39 y.o. male with history of intermittent explosive disorder, schizophrenia who presents today for follow-up for head injury. Patient went to the ED last week because he intentionally hit his head against a wall multiple times. Patient has performed this behavior in the past when he gets upset. He no longer has any head pain. He denies HA, vomiting, and seizures. He had a CT scan done the ED that came back negative.         Review of Systems   Constitutional:  Negative for chills and fever.   HENT:  Negative for sore throat.    Respiratory:  Negative for cough and shortness of breath.    Cardiovascular:  Negative for chest pain and palpitations.   Gastrointestinal:  Negative for abdominal pain, blood in stool, constipation, diarrhea, nausea and vomiting.   Genitourinary:  Negative for dysuria and hematuria.   Musculoskeletal: Negative.  Negative for arthralgias and back pain.   Skin: Negative.  Negative for color change and rash.   Neurological: Negative.  Negative for syncope and headaches.   Psychiatric/Behavioral:  Positive for agitation, behavioral problems and self-injury.    All other systems reviewed and are  negative.         Objective   /96 (BP Location: Left arm, Patient Position: Sitting, Cuff Size: Standard)   Pulse 86   Temp 99.7 °F (37.6 °C) (Temporal)   Resp 18   Wt 113 kg (249 lb)   SpO2 98%   BMI 36.45 kg/m²      Physical Exam  Vitals and nursing note reviewed.   Constitutional:       General: He is not in acute distress.     Appearance: He is well-developed. He is obese.   HENT:      Head: Normocephalic and atraumatic.   Eyes:      Conjunctiva/sclera: Conjunctivae normal.   Cardiovascular:      Rate and Rhythm: Normal rate and regular rhythm.      Heart sounds: No murmur heard.  Pulmonary:      Effort: Pulmonary effort is normal. No respiratory distress.      Breath sounds: Normal breath sounds.   Abdominal:      Palpations: Abdomen is soft.      Tenderness: There is no abdominal tenderness.   Musculoskeletal:         General: No swelling.      Cervical back: Neck supple.   Skin:     General: Skin is warm and dry.      Capillary Refill: Capillary refill takes less than 2 seconds.   Neurological:      Mental Status: He is alert. Mental status is at baseline.   Psychiatric:         Mood and Affect: Mood normal.

## 2024-12-09 NOTE — ASSESSMENT & PLAN NOTE
Previously controlled on amlodipine 5 mg daily but patient's caretakers have noted that it has been increasingly elevated at the group home, elevated today as well at 143/96.  On repeat it was 139/98.  Caretaker states that on average at home it is greater than 130/80.    Plan:  Will increase amlodipine to 10 mg daily today and follow-up in 1 week for blood pressure recheck

## 2024-12-09 NOTE — TELEPHONE ENCOUNTER
Folder (To be completed by) -Dr. Lawrence     Name of Form [facility] [employer]        Form to be completed at visit    Patient was made aware of the 7-10 business day form policy.

## 2024-12-12 DIAGNOSIS — F31.9 BIPOLAR 1 DISORDER (HCC): ICD-10-CM

## 2024-12-12 DIAGNOSIS — E55.9 VITAMIN D DEFICIENCY: ICD-10-CM

## 2024-12-12 RX ORDER — CHOLECALCIFEROL (VITAMIN D3) 25 MCG
1000 TABLET ORAL DAILY
Qty: 31 TABLET | Refills: 3 | Status: SHIPPED | OUTPATIENT
Start: 2024-12-12

## 2024-12-12 RX ORDER — LEVOMEFOLATE CALCIUM 15 MG
15 TABLET ORAL DAILY
Qty: 90 TABLET | Refills: 3 | Status: SHIPPED | OUTPATIENT
Start: 2024-12-12

## 2024-12-16 ENCOUNTER — OFFICE VISIT (OUTPATIENT)
Dept: FAMILY MEDICINE CLINIC | Facility: CLINIC | Age: 39
End: 2024-12-16

## 2024-12-16 ENCOUNTER — TELEPHONE (OUTPATIENT)
Dept: FAMILY MEDICINE CLINIC | Facility: CLINIC | Age: 39
End: 2024-12-16

## 2024-12-16 VITALS
RESPIRATION RATE: 20 BRPM | TEMPERATURE: 97.8 F | SYSTOLIC BLOOD PRESSURE: 139 MMHG | WEIGHT: 249.4 LBS | BODY MASS INDEX: 36.51 KG/M2 | DIASTOLIC BLOOD PRESSURE: 88 MMHG | HEART RATE: 100 BPM

## 2024-12-16 DIAGNOSIS — I10 BENIGN ESSENTIAL HYPERTENSION: Primary | ICD-10-CM

## 2024-12-16 PROCEDURE — 3075F SYST BP GE 130 - 139MM HG: CPT | Performed by: FAMILY MEDICINE

## 2024-12-16 PROCEDURE — 3079F DIAST BP 80-89 MM HG: CPT | Performed by: FAMILY MEDICINE

## 2024-12-16 PROCEDURE — 99213 OFFICE O/P EST LOW 20 MIN: CPT | Performed by: FAMILY MEDICINE

## 2024-12-16 NOTE — ASSESSMENT & PLAN NOTE
Recently increased amlodipine to 10mg daily due to BP out of goal   BP in the office today 139/88 - controlled     Plan:  Continue amlodipine 10mg daily   Monitor for signs of BL LE edema   Handout on DASH diet provided  Follow up in 3 months for general follow up

## 2024-12-16 NOTE — PROGRESS NOTES
Name: Delmer Giordano      : 1985      MRN: [id number]  Encounter Provider: Lorie Lawrence DO  Encounter Date: 2024   Encounter department: [facility] [place]  :  Assessment & Plan  Benign essential hypertension  Recently increased amlodipine to 10mg daily due to BP out of goal   BP in the office today 139/88 - controlled     Plan:  Continue amlodipine 10mg daily   Monitor for signs of BL LE edema   Handout on DASH diet provided  Follow up in 3 months for general follow up                 History of Present Illness     HPI  40 yo M following up today for BP check after increasing amlodipine to 10mg daily. No side effects to report. No BL LE edema.   Review of Systems   Constitutional:  Negative for chills and fever.   HENT:  Negative for sore throat.    Respiratory:  Negative for cough and shortness of breath.    Cardiovascular:  Negative for chest pain and palpitations.   Gastrointestinal:  Negative for abdominal pain, blood in stool, constipation, diarrhea, nausea and vomiting.   Genitourinary:  Negative for dysuria and hematuria.   Musculoskeletal:  Negative for arthralgias and back pain.   Skin:  Negative for color change and rash.   Neurological:  Negative for syncope and headaches.   Psychiatric/Behavioral:  Negative for agitation and behavioral problems.    All other systems reviewed and are negative.      Objective   /88 (BP Location: Left arm, Patient Position: Sitting, Cuff Size: Large)   Pulse 100   Temp 97.8 °F (36.6 °C)   Resp 20   Wt 113 kg (249 lb 6.4 oz)   BMI 36.51 kg/m²      Physical Exam  Vitals and nursing note reviewed.   Constitutional:       General: He is not in acute distress.     Appearance: He is well-developed. He is obese.   HENT:      Head: Normocephalic and atraumatic.   Eyes:      Conjunctiva/sclera: Conjunctivae normal.   Cardiovascular:      Rate and Rhythm: Normal rate and regular rhythm.      Heart sounds: No murmur  heard.  Pulmonary:      Effort: Pulmonary effort is normal. No respiratory distress.      Breath sounds: Normal breath sounds.   Abdominal:      Palpations: Abdomen is soft.      Tenderness: There is no abdominal tenderness.   Musculoskeletal:         General: No swelling.      Cervical back: Neck supple.   Skin:     General: Skin is warm and dry.      Capillary Refill: Capillary refill takes less than 2 seconds.   Neurological:      Mental Status: He is alert.   Psychiatric:         Mood and Affect: Mood normal.

## 2024-12-20 ENCOUNTER — TELEPHONE (OUTPATIENT)
Dept: FAMILY MEDICINE CLINIC | Facility: CLINIC | Age: 39
End: 2024-12-20

## 2024-12-20 ENCOUNTER — APPOINTMENT (EMERGENCY)
Dept: RADIOLOGY | Facility: HOSPITAL | Age: 39
End: 2024-12-20
Payer: COMMERCIAL

## 2024-12-20 ENCOUNTER — HOSPITAL ENCOUNTER (EMERGENCY)
Facility: HOSPITAL | Age: 39
Discharge: HOME | End: 2024-12-20
Attending: EMERGENCY MEDICINE
Payer: COMMERCIAL

## 2024-12-20 VITALS
OXYGEN SATURATION: 97 % | TEMPERATURE: 97.6 F | SYSTOLIC BLOOD PRESSURE: 161 MMHG | HEART RATE: 97 BPM | RESPIRATION RATE: 18 BRPM | DIASTOLIC BLOOD PRESSURE: 98 MMHG

## 2024-12-20 DIAGNOSIS — J02.9 PHARYNGITIS: Primary | ICD-10-CM

## 2024-12-20 LAB
ALBUMIN SERPL BCG-MCNC: 3.9 G/DL (ref 3.5–5)
ALP SERPL-CCNC: 54 U/L (ref 34–104)
ALT SERPL W P-5'-P-CCNC: 47 U/L (ref 7–52)
ANION GAP SERPL CALCULATED.3IONS-SCNC: 8 MMOL/L (ref 4–13)
AST SERPL W P-5'-P-CCNC: 26 U/L (ref 13–39)
ATRIAL RATE: 93 BPM
BASOPHILS # BLD AUTO: 0.03 THOUSANDS/ΜL (ref 0–0.1)
BASOPHILS NFR BLD AUTO: 0 % (ref 0–1)
BILIRUB SERPL-MCNC: 0.31 MG/DL (ref 0.2–1)
BUN SERPL-MCNC: 9 MG/DL (ref 5–25)
CALCIUM SERPL-MCNC: 9.6 MG/DL (ref 8.4–10.2)
CARDIAC TROPONIN I PNL SERPL HS: 4 NG/L (ref ?–50)
CHLORIDE SERPL-SCNC: 108 MMOL/L (ref 96–108)
CO2 SERPL-SCNC: 25 MMOL/L (ref 21–32)
CREAT SERPL-MCNC: 0.97 MG/DL (ref 0.6–1.3)
EOSINOPHIL # BLD AUTO: 0.1 THOUSAND/ΜL (ref 0–0.61)
EOSINOPHIL NFR BLD AUTO: 1 % (ref 0–6)
ERYTHROCYTE [DISTWIDTH] IN BLOOD BY AUTOMATED COUNT: 14.6 % (ref 11.6–15.1)
GFR SERPL CREATININE-BSD FRML MDRD: 98 ML/MIN/1.73SQ M
GLUCOSE SERPL-MCNC: 101 MG/DL (ref 65–140)
HCT VFR BLD AUTO: 45.2 % (ref 36.5–49.3)
HGB BLD-MCNC: 13.8 G/DL (ref 12–17)
IMM GRANULOCYTES # BLD AUTO: 0.02 THOUSAND/UL (ref 0–0.2)
IMM GRANULOCYTES NFR BLD AUTO: 0 % (ref 0–2)
LYMPHOCYTES # BLD AUTO: 1.93 THOUSANDS/ΜL (ref 0.6–4.47)
LYMPHOCYTES NFR BLD AUTO: 25 % (ref 14–44)
MCH RBC QN AUTO: 26.2 PG (ref 26.8–34.3)
MCHC RBC AUTO-ENTMCNC: 30.5 G/DL (ref 31.4–37.4)
MCV RBC AUTO: 86 FL (ref 82–98)
MONOCYTES # BLD AUTO: 1.36 THOUSAND/ΜL (ref 0.17–1.22)
MONOCYTES NFR BLD AUTO: 17 % (ref 4–12)
NEUTROPHILS # BLD AUTO: 4.39 THOUSANDS/ΜL (ref 1.85–7.62)
NEUTS SEG NFR BLD AUTO: 57 % (ref 43–75)
NRBC BLD AUTO-RTO: 0 /100 WBCS
P AXIS: 13 DEGREES
PLATELET # BLD AUTO: 215 THOUSANDS/UL (ref 149–390)
PMV BLD AUTO: 11.6 FL (ref 8.9–12.7)
POTASSIUM SERPL-SCNC: 4 MMOL/L (ref 3.5–5.3)
PR INTERVAL: 152 MS
PROT SERPL-MCNC: 7.7 G/DL (ref 6.4–8.4)
QRS AXIS: 229 DEGREES
QRSD INTERVAL: 146 MS
QT INTERVAL: 392 MS
QTC INTERVAL: 488 MS
RBC # BLD AUTO: 5.27 MILLION/UL (ref 3.88–5.62)
SODIUM SERPL-SCNC: 141 MMOL/L (ref 135–147)
T WAVE AXIS: 0 DEGREES
VENTRICULAR RATE: 93 BPM
WBC # BLD AUTO: 7.83 THOUSAND/UL (ref 4.31–10.16)

## 2024-12-20 PROCEDURE — 36415 COLL VENOUS BLD VENIPUNCTURE: CPT

## 2024-12-20 PROCEDURE — 93005 ELECTROCARDIOGRAM TRACING: CPT

## 2024-12-20 PROCEDURE — 84484 ASSAY OF TROPONIN QUANT: CPT | Performed by: EMERGENCY MEDICINE

## 2024-12-20 PROCEDURE — 99283 EMERGENCY DEPT VISIT LOW MDM: CPT

## 2024-12-20 PROCEDURE — 80053 COMPREHEN METABOLIC PANEL: CPT | Performed by: EMERGENCY MEDICINE

## 2024-12-20 PROCEDURE — 99284 EMERGENCY DEPT VISIT MOD MDM: CPT | Performed by: EMERGENCY MEDICINE

## 2024-12-20 PROCEDURE — 85025 COMPLETE CBC W/AUTO DIFF WBC: CPT | Performed by: EMERGENCY MEDICINE

## 2024-12-20 PROCEDURE — 93010 ELECTROCARDIOGRAM REPORT: CPT | Performed by: STUDENT IN AN ORGANIZED HEALTH CARE EDUCATION/TRAINING PROGRAM

## 2024-12-20 PROCEDURE — 71045 X-RAY EXAM CHEST 1 VIEW: CPT

## 2024-12-20 RX ORDER — PREDNISONE 20 MG/1
40 TABLET ORAL ONCE
Status: DISCONTINUED | OUTPATIENT
Start: 2024-12-20 | End: 2024-12-20

## 2024-12-20 RX ORDER — PENICILLIN V POTASSIUM 500 MG/1
500 TABLET, FILM COATED ORAL ONCE
Status: COMPLETED | OUTPATIENT
Start: 2024-12-20 | End: 2024-12-20

## 2024-12-20 RX ORDER — PENICILLIN V POTASSIUM 500 MG/1
500 TABLET, FILM COATED ORAL EVERY 6 HOURS SCHEDULED
Qty: 40 TABLET | Refills: 0 | Status: SHIPPED | OUTPATIENT
Start: 2024-12-20 | End: 2024-12-30

## 2024-12-20 RX ADMIN — PENICILLIN V POTASSIUM 500 MG: 500 TABLET, FILM COATED ORAL at 15:12

## 2024-12-20 RX ADMIN — DEXAMETHASONE 10 MG: 2 TABLET ORAL at 15:12

## 2024-12-20 NOTE — ED ATTENDING ATTESTATION
12/20/2024  I, Javy Bernard DO, saw and evaluated the patient. I have discussed the patient with the resident/non-physician practitioner and agree with the resident's/non-physician practitioner's findings, Plan of Care, and MDM as documented in the resident's/non-physician practitioner's note, except where noted. All available labs and Radiology studies were reviewed.  I was present for key portions of any procedure(s) performed by the resident/non-physician practitioner and I was immediately available to provide assistance.       At this point I agree with the current assessment done in the Emergency Department.  I have conducted an independent evaluation of this patient a history and physical is as follows:    40 yo male w/hx intellectual delay comes from group home with concern for acting more fatigued than usual. Amlodipine was recently increased, staff concerned that it could be medication side effect. However, pt also c/o sore throat. Appears to have pharyngitis. Bacterial vs viral. Plan: empiric pen vk, dexamethasone for pain/swelling.      ED Course         Critical Care Time  Procedures

## 2024-12-20 NOTE — TELEPHONE ENCOUNTER
Folder (To be completed by) - Dr. Lawrence      Name of Form - [facility] 12/16/ visit        Form to be Faxed (Fax #), Mailed (Address), or Picked up (By whom) -    Patient was made aware of the 7-10 business day form policy.

## 2024-12-23 NOTE — ED PROVIDER NOTES
"Time reflects when diagnosis was documented in both MDM as applicable and the Disposition within this note       Time User Action Codes Description Comment    12/20/2024  3:01 PM Jacob Trejo Add [J02.9] Pharyngitis           ED Disposition       ED Disposition   Discharge    Condition   Stable    Date/Time   Fri Dec 20, 2024  3:01 PM    Comment   Delmer Giordano discharge to home/self care.                   Assessment & Plan   {Hyperlinks  Risk Stratification - NIHSS - HEART SCORE - Fill out sepsis note and make sure you call [id number] if severe or septic shock:[id number]}    Medical Decision Making  Amount and/or Complexity of Data Reviewed  Labs: ordered.  Radiology: ordered.    Risk  Prescription drug management.        ED Course as of 12/23/24 1704   Fri Dec 20, 2024   1435 Super lethargic x3 days, med change last week, norvasc was upped 5-->10mg, \"looked like he was on heroin\" family with + sore throat, \"moving in slow motion,\" less of a fatigue complaint, -pain complaint, -fever, -cough, -congested, +belly pain, LUQ, n/v, eating okay, robitussin / throat spray this AM 9:45       Medications   penicillin V potassium (VEETID) tablet 500 mg (500 mg Oral Given 12/20/24 1512)   dexamethasone (DECADRON) tablet 10 mg (10 mg Oral Given 12/20/24 1512)       ED Risk Strat Scores                                              History of Present Illness   {Hyperlinks  History (Med, Surg, Fam, Social) - Current Medications - Allergies  :[id number]}    Chief Complaint   Patient presents with    Fatigue     Per patient's caregiver patient Norvasc recently increased about a week ago. Caregiver notes that patient has been acting \"off\" since Wednesday and seems more fatigued and slow moving than typical. Started with sore throat around the same time.       Past Medical History:   Diagnosis Date    ADHD (attention deficit hyperactivity disorder)     Atypical pervasive developmental disorder     Autism     Bipolar disorder (HCC)  "    Constipation     Depression     Head-banging     Last Assessed:  9/1/17    Hydronephrosis 9/19/2019    Hyperlipidemia     Hypertension     Intermittent explosive disorder     Oppositional defiant disorder     Personality disorder (HCC)     Schizophrenia (HCC)     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HCC)     Seizures (HCC)     Sleep disorder     Vitamin D insufficiency     Last Assessed:  6/22/17      Past Surgical History:   Procedure Laterality Date    NO PAST SURGERIES      UMBILICAL HERNIA REPAIR        History reviewed. No pertinent family history.   Social History     Tobacco Use    Smoking status: Never    Smokeless tobacco: Never   Vaping Use    Vaping status: Never Used   Substance Use Topics    Alcohol use: No    Drug use: No      E-Cigarette/Vaping    E-Cigarette Use Never User       E-Cigarette/Vaping Substances    Nicotine No     THC No     CBD No     Flavoring No     Other No     Unknown No       I have reviewed and agree with the history as documented.     HPI    Review of Systems        Objective   {Hyperlinks  Historical Vitals - Historical Labs - Chart Review/Microbiology - Last Echo - Code Status  :[id number]}    ED Triage Vitals [12/20/24 1210]   Temperature Pulse Blood Pressure Respirations SpO2 Patient Position - Orthostatic VS   97.6 °F (36.4 °C) 97 161/98 18 97 % Sitting      Temp Source Heart Rate Source BP Location FiO2 (%) Pain Score    Temporal Monitor Left arm -- --      Vitals      Date and Time Temp Pulse SpO2 Resp BP Pain Score FACES Pain Rating User   12/20/24 1210 97.6 °F (36.4 °C) 97 97 % 18 161/98 -- -- HB            Physical Exam    Results Reviewed       Procedure Component Value Units Date/Time    HS Troponin 0hr (reflex protocol) [[id number]]  (Normal) Collected: 12/20/24 1215    Lab Status: Final result Specimen: Blood from Hand, Left Updated: 12/20/24 1255     hs TnI 0hr 4 ng/L     Comprehensive metabolic panel [[id number]] Collected: 12/20/24 1215    Lab Status: Final result  Specimen: Blood from Hand, Left Updated: 12/20/24 1250     Sodium 141 mmol/L      Potassium 4.0 mmol/L      Chloride 108 mmol/L      CO2 25 mmol/L      ANION GAP 8 mmol/L      BUN 9 mg/dL      Creatinine 0.97 mg/dL      Glucose 101 mg/dL      Calcium 9.6 mg/dL      AST 26 U/L      ALT 47 U/L      Alkaline Phosphatase 54 U/L      Total Protein 7.7 g/dL      Albumin 3.9 g/dL      Total Bilirubin 0.31 mg/dL      eGFR 98 ml/min/1.73sq m     Narrative:      National Kidney Disease Foundation guidelines for Chronic Kidney Disease (CKD):     Stage 1 with normal or high GFR (GFR > 90 mL/min/1.73 square meters)    Stage 2 Mild CKD (GFR = 60-89 mL/min/1.73 square meters)    Stage 3A Moderate CKD (GFR = 45-59 mL/min/1.73 square meters)    Stage 3B Moderate CKD (GFR = 30-44 mL/min/1.73 square meters)    Stage 4 Severe CKD (GFR = 15-29 mL/min/1.73 square meters)    Stage 5 End Stage CKD (GFR <15 mL/min/1.73 square meters)  Note: GFR calculation is accurate only with a steady state creatinine    CBC and differential [[id number]]  (Abnormal) Collected: 12/20/24 1215    Lab Status: Final result Specimen: Blood from Hand, Left Updated: 12/20/24 1228     WBC 7.83 Thousand/uL      RBC 5.27 Million/uL      Hemoglobin 13.8 g/dL      Hematocrit 45.2 %      MCV 86 fL      MCH 26.2 pg      MCHC 30.5 g/dL      RDW 14.6 %      MPV 11.6 fL      Platelets 215 Thousands/uL      nRBC 0 /100 WBCs      Segmented % 57 %      Immature Grans % 0 %      Lymphocytes % 25 %      Monocytes % 17 %      Eosinophils Relative 1 %      Basophils Relative 0 %      Absolute Neutrophils 4.39 Thousands/µL      Absolute Immature Grans 0.02 Thousand/uL      Absolute Lymphocytes 1.93 Thousands/µL      Absolute Monocytes 1.36 Thousand/µL      Eosinophils Absolute 0.10 Thousand/µL      Basophils Absolute 0.03 Thousands/µL             XR chest 1 view portable   Final Interpretation by Alejandro Cabrera MD (12/20 2389)      No acute cardiopulmonary disease.             Resident: Serjio Glynn I, the attending radiologist, have reviewed the images and agree with the final report above.      Workstation performed: [id number]             Procedures    ED Medication and Procedure Management   Prior to Admission Medications   Prescriptions Last Dose Informant Patient Reported? Taking?   L-Methylfolate 15 MG TABS   No No   Sig: Take 1 tablet (15 mg total) by mouth in the morning   LORazepam (ATIVAN) 1 mg tablet  Care Giver Yes No   Sig: Take 1 mg by mouth 3 (three) times a day   Mouthwashes (Listerine Antiseptic) LIQD   No No   Sig: Apply 10 mL to the mouth or throat daily   Neomycin-Bacitracin-Polymyxin (First Aid Antibiotic) 3.5-400-5000 MG-UNIT OINT   No No   Sig: Apply 3.5 mg topically 2 (two) times a day as needed (topical on scalp lacertation) Apply BID PRN for wound care   OLANZapine (ZyPREXA) 10 mg tablet   Yes No   OLANZapine (ZyPREXA) 15 mg tablet   Yes No   Patient not taking: Reported on 12/16/2024   OLANZapine (ZyPREXA) 20 MG tablet   Yes No   Patient not taking: Reported on 8/16/2024   OLANZapine (ZyPREXA) 5 mg tablet   Yes No   Patient not taking: Reported on 4/26/2024   SODIUM FLUORIDE, DENTAL RINSE, (Listerine Smart Rinse) 0.02 % SOLN   No No   Sig: Swish and spit one capful in mouth before bed daily.   Sodium Fluoride (PreviDent 5000 Booster Plus) 1.1 % PSTE   No No   Sig: Pea sized amount to brush at bedtime   Sulfacetamide Sodium, Acne, 10 % LOTN   No No   Sig: Apply topically in the morning   Sunscreens (Sunblock SPF30) LOTN   No No   Sig: Apply every 2 hours up to five times daily   acetaminophen (TYLENOL) 325 mg tablet   Yes No   amLODIPine (NORVASC) 10 mg tablet   No No   Sig: Take 1 tablet (10 mg total) by mouth daily   ammonium lactate (LAC-HYDRIN) 12 % lotion   No No   Sig: APPLY TOP. TO AFF. AREA ON SKIN TWICE DAILY AS NEEDED (ECZEMA) *BARRIENTOS   benzoyl peroxide 10 % LIQD   No No   Sig: USE TO WASH FACE/ SHOULDERS/BACK As needed  (ACNE)*STOP USING IF  EXCESSIVE IRRIT/RED   bismuth subsalicylate (PEPTO BISMOL) 524 mg/30 mL oral suspension   No No   Sig: Take 15 mL (262 mg total) by mouth every 6 (six) hours as needed for indigestion or diarrhea   carbamide peroxide (DEBROX) 6.5 % otic solution   No No   Sig: Administer 5 drops into both ears 2 (two) times a day for 4 days   cetirizine (ZyrTEC) 5 MG chewable tablet   No No   Sig: Chew 1 tablet (5 mg total) daily at bedtime as needed for allergies   cholecalciferol (VITAMIN D3) 1,000 units tablet   No No   Sig: Take 1 tablet (1,000 Units total) by mouth daily   dextromethorphan-guaiFENesin (ROBITUSSIN DM)  mg/5 mL syrup   No No   Sig: Take 5 mL by mouth 3 (three) times a day as needed for cough or congestion   Patient not taking: Reported on 7/2/2024   divalproex sodium (DEPAKOTE ER) 500 mg 24 hr tablet  Care Giver No No   Sig: Take 3 tablets (1500 mg total) by mouth once daily at 5 pm   docusate sodium (COLACE) 100 mg capsule   No No   Sig: Take 1 capsule (100 mg total) by mouth 2 (two) times a day as needed for constipation   gabapentin (NEURONTIN) 600 MG tablet   No No   Sig: Take 1 tablet (800 mg total) by mouth 3 times daily at 8 am, 4 pm, and 8 pm   ibuprofen (MOTRIN) 600 mg tablet   No No   Sig: Take 1 tablet (600 mg total) by mouth every 6 (six) hours as needed for mild pain   lanolin-mineral oil (BABY OIL) OIL   No No   Sig: Apply topically if needed for dry skin Use as needed for dry scalp   lithium carbonate (LITHOBID) 450 mg CR tablet  Care Giver No No   Sig: Take 2 tablets (900 mg total) by mouth daily at 5:30 pm   omeprazole (PriLOSEC) 20 mg delayed release capsule   No No   Sig: TAKE 1 CAPSULE BY MOUTH ONCE DAILY AT 7AM (GERD) *LIYANAGE   Patient not taking: Reported on 12/16/2024   phenol (CHLORASEPTIC) 1.4 % mucosal liquid   No No   Sig: Apply 1 spray to the mouth or throat every 2 (two) hours as needed (sore throat)   polyethylene glycol (MIRALAX) 17 g packet   No No   Sig: Take 17 g by mouth  2 (two) times a day as needed (for constipation) To be used as needed for constipation and hard stools   pyrithione zinc (HEAD AND SHOULDERS) 1 % shampoo   No No   Sig: Apply topically daily as needed for dandruff      Facility-Administered Medications Last Administration Doses Remaining   neomycin-bacitracin-polymyxin (NEOSPORIN) ointment None recorded         Discharge Medication List as of 12/20/2024  3:03 PM        START taking these medications    Details   penicillin V potassium (VEETID) 500 mg tablet Take 1 tablet (500 mg total) by mouth every 6 (six) hours for 10 days, Starting Fri 12/20/2024, Until Mon 12/30/2024, Normal           CONTINUE these medications which have NOT CHANGED    Details   acetaminophen (TYLENOL) 325 mg tablet Historical Med      amLODIPine (NORVASC) 10 mg tablet Take 1 tablet (10 mg total) by mouth daily, Starting Mon 12/9/2024, Normal      ammonium lactate (LAC-HYDRIN) 12 % lotion APPLY TOP. TO AFF. AREA ON SKIN TWICE DAILY AS NEEDED (ECZEMA) *BARRIENTOS, Normal      benzoyl peroxide 10 % LIQD USE TO WASH FACE/ SHOULDERS/BACK As needed  (ACNE)*STOP USING IF EXCESSIVE IRRIT/RED, Normal      bismuth subsalicylate (PEPTO BISMOL) 524 mg/30 mL oral suspension Take 15 mL (262 mg total) by mouth every 6 (six) hours as needed for indigestion or diarrhea, Starting Wed 3/22/2023, Normal      carbamide peroxide (DEBROX) 6.5 % otic solution Administer 5 drops into both ears 2 (two) times a day for 4 days, Starting Wed 3/22/2023, Until Mon 12/9/2024, Normal      cetirizine (ZyrTEC) 5 MG chewable tablet Chew 1 tablet (5 mg total) daily at bedtime as needed for allergies, Starting Fri 7/21/2023, Normal      cholecalciferol (VITAMIN D3) 1,000 units tablet Take 1 tablet (1,000 Units total) by mouth daily, Starting Thu 12/12/2024, Normal      dextromethorphan-guaiFENesin (ROBITUSSIN DM)  mg/5 mL syrup Take 5 mL by mouth 3 (three) times a day as needed for cough or congestion, Starting Fri 7/21/2023,  Normal      divalproex sodium (DEPAKOTE ER) 500 mg 24 hr tablet Take 3 tablets (1500 mg total) by mouth once daily at 5 pm, Normal      docusate sodium (COLACE) 100 mg capsule Take 1 capsule (100 mg total) by mouth 2 (two) times a day as needed for constipation, Starting Fri 7/21/2023, Normal      gabapentin (NEURONTIN) 600 MG tablet Take 1 tablet (800 mg total) by mouth 3 times daily at 8 am, 4 pm, and 8 pm, Normal      ibuprofen (MOTRIN) 600 mg tablet Take 1 tablet (600 mg total) by mouth every 6 (six) hours as needed for mild pain, Starting Tue 12/5/2023, Normal      L-Methylfolate 15 MG TABS Take 1 tablet (15 mg total) by mouth in the morning, Starting Thu 12/12/2024, Normal      lanolin-mineral oil (BABY OIL) OIL Apply topically if needed for dry skin Use as needed for dry scalp, Starting Fri 7/21/2023, Normal      lithium carbonate (LITHOBID) 450 mg CR tablet Take 2 tablets (900 mg total) by mouth daily at 5:30 pm, No Print      LORazepam (ATIVAN) 1 mg tablet Take 1 mg by mouth 3 (three) times a day, Starting Fri 12/17/2021, Historical Med      Mouthwashes (Listerine Antiseptic) LIQD Apply 10 mL to the mouth or throat daily, Starting Fri 3/8/2024, Normal      Neomycin-Bacitracin-Polymyxin (First Aid Antibiotic) 3.5-400-5000 MG-UNIT OINT Apply 3.5 mg topically 2 (two) times a day as needed (topical on scalp lacertation) Apply BID PRN for wound care, Starting Fri 7/21/2023, Normal      !! OLANZapine (ZyPREXA) 10 mg tablet Historical Med      !! OLANZapine (ZyPREXA) 15 mg tablet Historical Med      !! OLANZapine (ZyPREXA) 20 MG tablet Historical Med      !! OLANZapine (ZyPREXA) 5 mg tablet Historical Med      omeprazole (PriLOSEC) 20 mg delayed release capsule TAKE 1 CAPSULE BY MOUTH ONCE DAILY AT 7AM (GERD) *LIYANAGE, Normal      phenol (CHLORASEPTIC) 1.4 % mucosal liquid Apply 1 spray to the mouth or throat every 2 (two) hours as needed (sore throat), Starting Sun 12/8/2024, Normal      polyethylene glycol  (MIRALAX) 17 g packet Take 17 g by mouth 2 (two) times a day as needed (for constipation) To be used as needed for constipation and hard stools, Starting Sun 12/8/2024, Normal      pyrithione zinc (HEAD AND SHOULDERS) 1 % shampoo Apply topically daily as needed for dandruff, Starting Tue 7/23/2024, Normal      Sodium Fluoride (PreviDent 5000 Booster Plus) 1.1 % PSTE Pea sized amount to brush at bedtime, Print      SODIUM FLUORIDE, DENTAL RINSE, (Listerine Smart Rinse) 0.02 % SOLN Swish and spit one capful in mouth before bed daily., Normal      Sulfacetamide Sodium, Acne, 10 % LOTN Apply topically in the morning, Starting Tue 7/23/2024, Normal      Sunscreens (Sunblock SPF30) LOTN Apply every 2 hours up to five times daily, Normal       !! - Potential duplicate medications found. Please discuss with provider.        No discharge procedures on file.  ED SEPSIS DOCUMENTATION   Time reflects when diagnosis was documented in both MDM as applicable and the Disposition within this note       Time User Action Codes Description Comment    12/20/2024  3:01 PM Jacob Trejo Add [J02.9] Pharyngitis                daily, Starting Fri 3/8/2024, Normal      Neomycin-Bacitracin-Polymyxin (First Aid Antibiotic) 3.5-400-5000 MG-UNIT OINT Apply 3.5 mg topically 2 (two) times a day as needed (topical on scalp lacertation) Apply BID PRN for wound care, Starting Fri 7/21/2023, Normal      !! OLANZapine (ZyPREXA) 10 mg tablet Historical Med      !! OLANZapine (ZyPREXA) 15 mg tablet Historical Med      !! OLANZapine (ZyPREXA) 20 MG tablet Historical Med      !! OLANZapine (ZyPREXA) 5 mg tablet Historical Med      omeprazole (PriLOSEC) 20 mg delayed release capsule TAKE 1 CAPSULE BY MOUTH ONCE DAILY AT 7AM (GERD) *LIYANAGE, Normal      phenol (CHLORASEPTIC) 1.4 % mucosal liquid Apply 1 spray to the mouth or throat every 2 (two) hours as needed (sore throat), Starting Sun 12/8/2024, Normal      polyethylene glycol (MIRALAX) 17 g packet Take 17 g by mouth 2 (two) times a day as needed (for constipation) To be used as needed for constipation and hard stools, Starting Sun 12/8/2024, Normal      pyrithione zinc (HEAD AND SHOULDERS) 1 % shampoo Apply topically daily as needed for dandruff, Starting Tue 7/23/2024, Normal      Sodium Fluoride (PreviDent 5000 Booster Plus) 1.1 % PSTE Pea sized amount to brush at bedtime, Print      SODIUM FLUORIDE, DENTAL RINSE, (Listerine Smart Rinse) 0.02 % SOLN Swish and spit one capful in mouth before bed daily., Normal      Sulfacetamide Sodium, Acne, 10 % LOTN Apply topically in the morning, Starting Tue 7/23/2024, Normal      Sunscreens (Sunblock SPF30) LOTN Apply every 2 hours up to five times daily, Normal       !! - Potential duplicate medications found. Please discuss with provider.        No discharge procedures on file.  ED SEPSIS DOCUMENTATION   Time reflects when diagnosis was documented in both MDM as applicable and the Disposition within this note       Time User Action Codes Description Comment    12/20/2024  3:01 PM Jacob Trejo Add [J02.9] Pharyngitis                  Jacob Trejo DO  01/03/25  2136

## 2025-02-11 DIAGNOSIS — K21.9 GASTROESOPHAGEAL REFLUX DISEASE: ICD-10-CM

## 2025-03-11 ENCOUNTER — OFFICE VISIT (OUTPATIENT)
Dept: DENTISTRY | Facility: CLINIC | Age: 40
End: 2025-03-11

## 2025-03-11 VITALS — HEART RATE: 80 BPM | DIASTOLIC BLOOD PRESSURE: 86 MMHG | SYSTOLIC BLOOD PRESSURE: 120 MMHG

## 2025-03-11 DIAGNOSIS — K03.6 ACCRETIONS ON TEETH: ICD-10-CM

## 2025-03-11 DIAGNOSIS — K02.61 DENTAL CARIES ON SMOOTH SURFACE LIMITED TO ENAMEL: ICD-10-CM

## 2025-03-11 DIAGNOSIS — Z01.20 ENCOUNTER FOR DENTAL EXAMINATION: Primary | ICD-10-CM

## 2025-03-11 PROCEDURE — D1110 PROPHYLAXIS - ADULT: HCPCS

## 2025-03-11 PROCEDURE — D0230 INTRAORAL - PERIAPICAL EACH ADDITIONAL RADIOGRAPHIC IMAGE: HCPCS

## 2025-03-11 PROCEDURE — D0220 INTRAORAL - PERIAPICAL FIRST RADIOGRAPHIC IMAGE: HCPCS

## 2025-03-11 PROCEDURE — D0120 PERIODIC ORAL EVALUATION - ESTABLISHED PATIENT: HCPCS | Performed by: DENTIST

## 2025-03-11 PROCEDURE — D0272 BITEWINGS - 2 RADIOGRAPHIC IMAGES: HCPCS

## 2025-03-11 NOTE — PROGRESS NOTES
PERIODIC EXAM, ADULT PROPHY , 2 BWX and PA   REVIEWED MED HX: meds, allergies, health changes reviewed in [facility]. All consents signed.  CHIEF CONCERN: * caregiver states hard to tell if something hurts  PAIN SCALE:  2  ASA CLASS:  III  PLAQUE:  moderate  CALCULUS:  light  BLEEDING:   light  STAIN :   light      PERIO:     Hygiene Procedures:  Scaled, Polished, Flossed    Oral Hygiene Instruction: Brushing Minimum 2x daily for 2 minutes, daily flossing, Recommended soft toothbrush only, and Reviewed dietary precautions    Dispensed: Toothbrush, Toothpaste, Floss and Flossers    Visual and Tactile Intraoral/ Extraoral evaluation: Oral and Oropharyngeal cancer evaluation. No findings     Dr. Montes De Oca   Reviewed with patient clinical and radiographic findings and patient verbalized understanding. All questions and concerns addressed.     REFERRALS: none    CARIES FINDINGS: see tooth chart  # 15 B ( may be 16 )     Gave RX for Prevident and Smart Rinse       TREATMENT  PLAN :   NV:  veronica UL buccal    Next Recall: 4 month recall     Last BWX:   3/11/2025  Last Panorex/ FMX : pan 12/2017  assorted PAX

## 2025-03-17 ENCOUNTER — OFFICE VISIT (OUTPATIENT)
Dept: FAMILY MEDICINE CLINIC | Facility: CLINIC | Age: 40
End: 2025-03-17

## 2025-03-17 VITALS
TEMPERATURE: 98.5 F | BODY MASS INDEX: 36.42 KG/M2 | OXYGEN SATURATION: 98 % | HEART RATE: 106 BPM | DIASTOLIC BLOOD PRESSURE: 86 MMHG | RESPIRATION RATE: 18 BRPM | SYSTOLIC BLOOD PRESSURE: 127 MMHG | WEIGHT: 248.8 LBS

## 2025-03-17 DIAGNOSIS — I10 BENIGN ESSENTIAL HYPERTENSION: Primary | ICD-10-CM

## 2025-03-17 DIAGNOSIS — F63.81 INTERMITTENT EXPLOSIVE DISORDER: ICD-10-CM

## 2025-03-17 DIAGNOSIS — J30.2 SEASONAL ALLERGIC RHINITIS, UNSPECIFIED TRIGGER: ICD-10-CM

## 2025-03-17 DIAGNOSIS — K59.04 CHRONIC IDIOPATHIC CONSTIPATION: ICD-10-CM

## 2025-03-17 DIAGNOSIS — K21.9 GASTROESOPHAGEAL REFLUX DISEASE WITHOUT ESOPHAGITIS: ICD-10-CM

## 2025-03-17 PROCEDURE — 99213 OFFICE O/P EST LOW 20 MIN: CPT | Performed by: FAMILY MEDICINE

## 2025-03-17 PROCEDURE — 3074F SYST BP LT 130 MM HG: CPT | Performed by: FAMILY MEDICINE

## 2025-03-17 PROCEDURE — 3079F DIAST BP 80-89 MM HG: CPT | Performed by: FAMILY MEDICINE

## 2025-03-17 NOTE — ASSESSMENT & PLAN NOTE
Recently increased amlodipine to 10mg daily due to BP out of goal   BP in the office today 127/86, controlled     Plan:  Continue amlodipine 10mg daily   Monitor for signs of BL LE edema   Plan to follow-up in July for annual physical

## 2025-03-17 NOTE — ASSESSMENT & PLAN NOTE
Followed by psychiatry and maintained on mood stabilizing regimen, continue to follow with psychiatry

## 2025-03-17 NOTE — ASSESSMENT & PLAN NOTE
Chronic constipation, with current flare  Patient's bowel movements are hard and difficult to pass, no blood associated  Currently taking Colace as needed  MiraLAX twice daily as needed

## 2025-03-20 DIAGNOSIS — K21.9 GASTROESOPHAGEAL REFLUX DISEASE: ICD-10-CM

## 2025-03-20 DIAGNOSIS — I10 ESSENTIAL HYPERTENSION: ICD-10-CM

## 2025-03-20 RX ORDER — AMLODIPINE BESYLATE 10 MG/1
10 TABLET ORAL DAILY
Qty: 30 TABLET | Refills: 3 | Status: SHIPPED | OUTPATIENT
Start: 2025-03-20

## 2025-03-20 RX ORDER — OMEPRAZOLE 20 MG/1
20 CAPSULE, DELAYED RELEASE ORAL DAILY
Qty: 31 CAPSULE | Refills: 0 | Status: SHIPPED | OUTPATIENT
Start: 2025-03-20

## 2025-04-09 DIAGNOSIS — K21.9 GASTROESOPHAGEAL REFLUX DISEASE: ICD-10-CM

## 2025-04-09 RX ORDER — OMEPRAZOLE 20 MG/1
20 CAPSULE, DELAYED RELEASE ORAL DAILY
Qty: 90 CAPSULE | Refills: 3 | Status: SHIPPED | OUTPATIENT
Start: 2025-04-09

## 2025-05-05 ENCOUNTER — TELEPHONE (OUTPATIENT)
Dept: FAMILY MEDICINE CLINIC | Facility: CLINIC | Age: 40
End: 2025-05-05

## 2025-05-05 ENCOUNTER — OFFICE VISIT (OUTPATIENT)
Dept: FAMILY MEDICINE CLINIC | Facility: CLINIC | Age: 40
End: 2025-05-05

## 2025-05-05 VITALS
HEART RATE: 88 BPM | TEMPERATURE: 98.6 F | SYSTOLIC BLOOD PRESSURE: 130 MMHG | OXYGEN SATURATION: 97 % | RESPIRATION RATE: 16 BRPM | BODY MASS INDEX: 36.75 KG/M2 | WEIGHT: 251 LBS | DIASTOLIC BLOOD PRESSURE: 86 MMHG

## 2025-05-05 DIAGNOSIS — K59.04 CHRONIC IDIOPATHIC CONSTIPATION: ICD-10-CM

## 2025-05-05 DIAGNOSIS — I10 ESSENTIAL HYPERTENSION: ICD-10-CM

## 2025-05-05 DIAGNOSIS — L70.0 ACNE VULGARIS: ICD-10-CM

## 2025-05-05 DIAGNOSIS — L30.9 DERMATITIS: ICD-10-CM

## 2025-05-05 DIAGNOSIS — F98.4 HEAD-BANGING: ICD-10-CM

## 2025-05-05 DIAGNOSIS — H10.13 ALLERGIC CONJUNCTIVITIS OF BOTH EYES AND RHINITIS: ICD-10-CM

## 2025-05-05 DIAGNOSIS — R19.7 DIARRHEA, UNSPECIFIED TYPE: ICD-10-CM

## 2025-05-05 DIAGNOSIS — E55.9 VITAMIN D DEFICIENCY: ICD-10-CM

## 2025-05-05 DIAGNOSIS — S09.90XD TRAUMATIC INJURY OF HEAD, SUBSEQUENT ENCOUNTER: Primary | ICD-10-CM

## 2025-05-05 DIAGNOSIS — K05.10 GINGIVITIS: ICD-10-CM

## 2025-05-05 DIAGNOSIS — F31.9 BIPOLAR 1 DISORDER (HCC): ICD-10-CM

## 2025-05-05 DIAGNOSIS — K59.00 CONSTIPATION, UNSPECIFIED CONSTIPATION TYPE: ICD-10-CM

## 2025-05-05 DIAGNOSIS — J30.9 ALLERGIC CONJUNCTIVITIS OF BOTH EYES AND RHINITIS: ICD-10-CM

## 2025-05-05 DIAGNOSIS — K30 INDIGESTION: ICD-10-CM
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
## 2025-05-05 DIAGNOSIS — L21.0 DANDRUFF: ICD-10-CM

## 2025-05-05 DIAGNOSIS — K08.9 POOR DENTITION: ICD-10-CM

## 2025-05-05 DIAGNOSIS — K21.9 GASTROESOPHAGEAL REFLUX DISEASE: ICD-10-CM

## 2025-05-05 DIAGNOSIS — R09.81 MILD NASAL CONGESTION: ICD-10-CM

## 2025-05-05 RX ORDER — AMMONIUM LACTATE 12 G/100G
LOTION TOPICAL
Qty: 225 G | Refills: 0 | Status: SHIPPED | OUTPATIENT
Start: 2025-05-05

## 2025-05-05 RX ORDER — AMLODIPINE BESYLATE 10 MG/1
10 TABLET ORAL DAILY
Qty: 30 TABLET | Refills: 3 | Status: SHIPPED | OUTPATIENT
Start: 2025-05-05

## 2025-05-05 RX ORDER — ACETAMINOPHEN 325 MG/1
325 TABLET ORAL EVERY 6 HOURS PRN
Qty: 360 TABLET | Refills: 1 | Status: SHIPPED | OUTPATIENT
Start: 2025-05-05 | End: 2025-11-01

## 2025-05-05 RX ORDER — GUAIFENESIN/DEXTROMETHORPHAN 100-10MG/5
5 SYRUP ORAL 3 TIMES DAILY PRN
Qty: 118 ML | Refills: 5 | Status: SHIPPED | OUTPATIENT
Start: 2025-05-05

## 2025-05-05 RX ORDER — DOCUSATE SODIUM 100 MG/1
100 CAPSULE, LIQUID FILLED ORAL 2 TIMES DAILY PRN
Qty: 60 CAPSULE | Refills: 12 | Status: SHIPPED | OUTPATIENT
Start: 2025-05-05

## 2025-05-05 RX ORDER — POLYETHYLENE GLYCOL 3350 17 G/17G
17 POWDER, FOR SOLUTION ORAL 2 TIMES DAILY PRN
Qty: 20 EACH | Refills: 1 | Status: SHIPPED | OUTPATIENT
Start: 2025-05-05

## 2025-05-05 RX ORDER — LEVOMEFOLATE CALCIUM 15 MG
15 TABLET ORAL DAILY
Qty: 90 TABLET | Refills: 3 | Status: SHIPPED | OUTPATIENT
Start: 2025-05-05

## 2025-05-05 RX ORDER — OMEPRAZOLE 20 MG/1
20 CAPSULE, DELAYED RELEASE ORAL DAILY
Qty: 90 CAPSULE | Refills: 3 | Status: SHIPPED | OUTPATIENT
Start: 2025-05-05

## 2025-05-05 RX ORDER — CHOLECALCIFEROL (VITAMIN D3) 25 MCG
1000 TABLET ORAL DAILY
Qty: 31 TABLET | Refills: 3 | Status: SHIPPED | OUTPATIENT
Start: 2025-05-05

## 2025-05-05 RX ORDER — SULFACETAMIDE SODIUM 100 MG/ML
LOTION TOPICAL DAILY
Qty: 118 ML | Refills: 5 | Status: SHIPPED | OUTPATIENT
Start: 2025-05-05

## 2025-05-05 RX ORDER — IBUPROFEN 600 MG/1
600 TABLET, FILM COATED ORAL EVERY 6 HOURS PRN
Qty: 30 TABLET | Refills: 3 | Status: SHIPPED | OUTPATIENT
Start: 2025-05-05

## 2025-05-05 RX ORDER — CETIRIZINE HYDROCHLORIDE 5 MG/1
5 TABLET, CHEWABLE ORAL
Qty: 30 TABLET | Refills: 5 | Status: SHIPPED | OUTPATIENT
Start: 2025-05-05

## 2025-05-05 RX ORDER — EUCALYP/ME-SALICYLATE/MEN/THYM
10 MOUTHWASH MUCOUS MEMBRANE DAILY
Qty: 250 ML | Refills: 3 | Status: SHIPPED | OUTPATIENT
Start: 2025-05-05

## 2025-05-05 RX ORDER — BENZOYL PEROXIDE 10 G/100G
SUSPENSION TOPICAL
Qty: 237 G | Refills: 5 | Status: SHIPPED | OUTPATIENT
Start: 2025-05-05

## 2025-05-05 NOTE — TELEPHONE ENCOUNTER
Folder (To be completed by) -Dr. Fagan     Name of Form - [facility] Medical exam casenote    Form to be completed at visit    Patient was made aware of the 7-10 business day form policy.

## 2025-05-05 NOTE — ASSESSMENT & PLAN NOTE
Orders:    SODIUM FLUORIDE, DENTAL RINSE, (Listerine Smart Rinse) 0.02 % SOLN; Swish and spit one capful in mouth before bed daily.

## 2025-05-05 NOTE — ASSESSMENT & PLAN NOTE
ED follow up from 5/1 at which time patient was evaluated for head trauma and noted to have forehead abrasions. Abrasions healed at this time. Neuro exam non focal.

## 2025-05-05 NOTE — PROGRESS NOTES
Name: Delmer Giordano      : 1985      MRN: [id number]  Encounter Provider: Kelsy Fagan MD  Encounter Date: 2025   Encounter department: [facility] [place]  :  Assessment & Plan  Traumatic injury of head, subsequent encounter  ED follow up from  at which time patient was evaluated for head trauma and noted to have forehead abrasions. Abrasions healed at this time. Neuro exam non focal.        Dermatitis         Acne vulgaris         Constipation, unspecified constipation type         Dandruff         Chronic idiopathic constipation         Gingivitis         Head-banging         Diarrhea, unspecified type         Indigestion         Mild nasal congestion         Mild sun exposure, subsequent encounter         Allergic conjunctivitis of both eyes and rhinitis         Vitamin D deficiency    Orders:    cholecalciferol (VITAMIN D3) 1,000 units tablet; Take 1 tablet (1,000 Units total) by mouth daily    Bipolar 1 disorder (HCC)    Orders:    L-Methylfolate 15 MG TABS; Take 1 tablet (15 mg total) by mouth in the morning    Poor dentition    Orders:    SODIUM FLUORIDE, DENTAL RINSE, (Listerine Smart Rinse) 0.02 % SOLN; Swish and spit one capful in mouth before bed daily.    Gastroesophageal reflux disease    Orders:    omeprazole (PriLOSEC) 20 mg delayed release capsule; Take 1 capsule (20 mg total) by mouth daily    Essential hypertension    Orders:    amLODIPine (NORVASC) 10 mg tablet; Take 1 tablet (10 mg total) by mouth daily           History of Present Illness   Patient presents to clinic with his caregiver for ED follow up for head injury. During ED visit patient was given tylenol and ibuprofen.     Patient states he does not have any pain of his head today and reports no other complaints or concerns. His caregiver states he has not needed any additional tylenol or ibuprofen due to pain of head.       Review of Systems   Constitutional:  Negative for chills  and fever.   Eyes:  Negative for visual disturbance.   Respiratory:  Negative for cough and shortness of breath.    Cardiovascular:  Negative for chest pain and palpitations.   Gastrointestinal:  Negative for abdominal pain and nausea.   Genitourinary:  Negative for difficulty urinating and dysuria.   Musculoskeletal:  Negative for arthralgias and myalgias.   Skin:  Negative for rash.   Allergic/Immunologic: Positive for environmental allergies.   Neurological:  Negative for dizziness and headaches.       Objective   /86 (BP Location: Left arm, Patient Position: Sitting, Cuff Size: Standard)   Pulse 88   Temp 98.6 °F (37 °C) (Temporal)   Resp 16   Wt 114 kg (251 lb)   SpO2 97%   BMI 36.75 kg/m²      Physical Exam  Vitals reviewed.   Constitutional:       General: He is not in acute distress.     Appearance: Normal appearance. He is not ill-appearing.   HENT:      Mouth/Throat:      Mouth: Mucous membranes are moist.      Pharynx: No oropharyngeal exudate or posterior oropharyngeal erythema.   Eyes:      Extraocular Movements: Extraocular movements intact.      Pupils: Pupils are equal, round, and reactive to light.   Cardiovascular:      Rate and Rhythm: Normal rate.      Heart sounds: Normal heart sounds.   Pulmonary:      Effort: Pulmonary effort is normal. No respiratory distress.      Breath sounds: Normal breath sounds.   Abdominal:      General: Bowel sounds are normal.      Palpations: Abdomen is soft.      Tenderness: There is no abdominal tenderness.   Musculoskeletal:      Cervical back: Neck supple.      Right lower leg: No edema.      Left lower leg: No edema.   Skin:     General: Skin is warm and dry.   Neurological:      Mental Status: He is alert.      Cranial Nerves: No cranial nerve deficit.      Sensory: No sensory deficit.      Motor: No weakness.

## 2025-05-08 ENCOUNTER — TELEPHONE (OUTPATIENT)
Dept: FAMILY MEDICINE CLINIC | Facility: CLINIC | Age: 40
End: 2025-05-08

## 2025-05-08 NOTE — TELEPHONE ENCOUNTER
PA for Zyrtec chewable tables Submitted to (Insurance) via:    []CMM-KEY:   []EPIC-Case ID #   []RX Benefits-EOD ID #   []Surescripts-Case ID #   [x]Faxed to [facility]  []Other website   []Phone call Case ID #     Office notes and documents to support this request sent, clinical questions answered. Awaiting determination    Turnaround time for the insurance to make a decision on the Prior Authorization can take 3-5 business day.

## 2025-05-08 NOTE — TELEPHONE ENCOUNTER
PA for Zyrtec chewable tablets Submitted to ([employer]) via:    []CMM-KEY:   [x]EPIC-Case ID # [id number]    []RX Benefits-EOD ID #   []Surescripts-Case ID #   []Faxed to plan   []Other website   []Phone call Case ID #     Office notes and documents to support this request sent, clinical questions answered. Awaiting determination    Turnaround time for the insurance to make a decision on the Prior Authorization can take 3-5 business day.          Received denial letter stating patient needs to try formulary like tablets or liquid. Documents stating patient did try the tablet form was submitted at this request. Another prior authorization will be submitted.

## 2025-05-12 NOTE — TELEPHONE ENCOUNTER
Prior authorization still denied. Per insurance in their record appear patient in on regular tablets. Letter scanned under media.

## 2025-05-19 ENCOUNTER — APPOINTMENT (OUTPATIENT)
Dept: LAB | Facility: CLINIC | Age: 40
End: 2025-05-19
Payer: COMMERCIAL

## 2025-05-19 DIAGNOSIS — E78.5 HYPERLIPIDEMIA, UNSPECIFIED HYPERLIPIDEMIA TYPE: ICD-10-CM

## 2025-05-19 DIAGNOSIS — E03.9 MYXEDEMA HEART DISEASE: ICD-10-CM

## 2025-05-19 DIAGNOSIS — Z79.899 ENCOUNTER FOR LONG-TERM (CURRENT) USE OF MEDICATIONS: ICD-10-CM

## 2025-05-19 DIAGNOSIS — I51.9 MYXEDEMA HEART DISEASE: ICD-10-CM

## 2025-05-19 LAB
ALBUMIN SERPL BCG-MCNC: 4.3 G/DL (ref 3.5–5)
ALP SERPL-CCNC: 56 U/L (ref 34–104)
ALT SERPL W P-5'-P-CCNC: 40 U/L (ref 7–52)
ANION GAP SERPL CALCULATED.3IONS-SCNC: 9 MMOL/L (ref 4–13)
AST SERPL W P-5'-P-CCNC: 33 U/L (ref 13–39)
BILIRUB SERPL-MCNC: 0.37 MG/DL (ref 0.2–1)
BUN SERPL-MCNC: 11 MG/DL (ref 5–25)
CALCIUM SERPL-MCNC: 10 MG/DL (ref 8.4–10.2)
CHLORIDE SERPL-SCNC: 107 MMOL/L (ref 96–108)
CHOLEST SERPL-MCNC: 226 MG/DL (ref ?–200)
CO2 SERPL-SCNC: 23 MMOL/L (ref 21–32)
CREAT SERPL-MCNC: 0.89 MG/DL (ref 0.6–1.3)
GFR SERPL CREATININE-BSD FRML MDRD: 107 ML/MIN/1.73SQ M
GLUCOSE P FAST SERPL-MCNC: 99 MG/DL (ref 65–99)
HDLC SERPL-MCNC: 49 MG/DL
LDLC SERPL CALC-MCNC: 151 MG/DL (ref 0–100)
LITHIUM SERPL-SCNC: 0.71 MMOL/L (ref 0.6–1.2)
NONHDLC SERPL-MCNC: 177 MG/DL
POTASSIUM SERPL-SCNC: 4.3 MMOL/L (ref 3.5–5.3)
PROT SERPL-MCNC: 8.1 G/DL (ref 6.4–8.4)
SODIUM SERPL-SCNC: 139 MMOL/L (ref 135–147)
T4 FREE SERPL-MCNC: 0.8 NG/DL (ref 0.61–1.12)
TRIGL SERPL-MCNC: 130 MG/DL (ref ?–150)
TSH SERPL DL<=0.05 MIU/L-ACNC: 0.4 UIU/ML (ref 0.45–4.5)
VALPROATE SERPL-MCNC: 88 UG/ML (ref 50–125)

## 2025-05-19 PROCEDURE — 36415 COLL VENOUS BLD VENIPUNCTURE: CPT

## 2025-05-19 PROCEDURE — 80061 LIPID PANEL: CPT

## 2025-05-19 PROCEDURE — 81001 URINALYSIS AUTO W/SCOPE: CPT

## 2025-05-19 PROCEDURE — 80178 ASSAY OF LITHIUM: CPT

## 2025-05-19 PROCEDURE — 80053 COMPREHEN METABOLIC PANEL: CPT

## 2025-05-19 PROCEDURE — 84443 ASSAY THYROID STIM HORMONE: CPT

## 2025-05-19 PROCEDURE — 84439 ASSAY OF FREE THYROXINE: CPT

## 2025-05-19 PROCEDURE — 80164 ASSAY DIPROPYLACETIC ACD TOT: CPT

## 2025-05-20 ENCOUNTER — APPOINTMENT (OUTPATIENT)
Dept: LAB | Facility: CLINIC | Age: 40
End: 2025-05-20
Attending: PSYCHIATRY & NEUROLOGY
Payer: COMMERCIAL

## 2025-05-20 ENCOUNTER — APPOINTMENT (OUTPATIENT)
Dept: LAB | Facility: CLINIC | Age: 40
End: 2025-05-20
Payer: COMMERCIAL

## 2025-05-20 LAB
BACTERIA UR QL AUTO: ABNORMAL /HPF
BASOPHILS # BLD AUTO: 0.07 THOUSANDS/ÂΜL (ref 0–0.1)
BASOPHILS NFR BLD AUTO: 1 % (ref 0–1)
BILIRUB UR QL STRIP: NEGATIVE
CLARITY UR: CLEAR
COLOR UR: ABNORMAL
EOSINOPHIL # BLD AUTO: 0.2 THOUSAND/ÂΜL (ref 0–0.61)
EOSINOPHIL NFR BLD AUTO: 2 % (ref 0–6)
ERYTHROCYTE [DISTWIDTH] IN BLOOD BY AUTOMATED COUNT: 14.6 % (ref 11.6–15.1)
GLUCOSE UR STRIP-MCNC: NEGATIVE MG/DL
HCT VFR BLD AUTO: 47.7 % (ref 36.5–49.3)
HGB BLD-MCNC: 14.2 G/DL (ref 12–17)
HGB UR QL STRIP.AUTO: NEGATIVE
IMM GRANULOCYTES # BLD AUTO: 0.05 THOUSAND/UL (ref 0–0.2)
IMM GRANULOCYTES NFR BLD AUTO: 1 % (ref 0–2)
KETONES UR STRIP-MCNC: NEGATIVE MG/DL
LEUKOCYTE ESTERASE UR QL STRIP: NEGATIVE
LYMPHOCYTES # BLD AUTO: 2.45 THOUSANDS/ÂΜL (ref 0.6–4.47)
LYMPHOCYTES NFR BLD AUTO: 29 % (ref 14–44)
MCH RBC QN AUTO: 26.3 PG (ref 26.8–34.3)
MCHC RBC AUTO-ENTMCNC: 29.8 G/DL (ref 31.4–37.4)
MCV RBC AUTO: 89 FL (ref 82–98)
MONOCYTES # BLD AUTO: 0.97 THOUSAND/ÂΜL (ref 0.17–1.22)
MONOCYTES NFR BLD AUTO: 11 % (ref 4–12)
NEUTROPHILS # BLD AUTO: 4.83 THOUSANDS/ÂΜL (ref 1.85–7.62)
NEUTS SEG NFR BLD AUTO: 56 % (ref 43–75)
NITRITE UR QL STRIP: NEGATIVE
NON-SQ EPI CELLS URNS QL MICRO: ABNORMAL /HPF
NRBC BLD AUTO-RTO: 0 /100 WBCS
PH UR STRIP.AUTO: 6.5 [PH]
PLATELET # BLD AUTO: 337 THOUSANDS/UL (ref 149–390)
PMV BLD AUTO: 12 FL (ref 8.9–12.7)
PROT UR STRIP-MCNC: ABNORMAL MG/DL
RBC # BLD AUTO: 5.39 MILLION/UL (ref 3.88–5.62)
RBC #/AREA URNS AUTO: ABNORMAL /HPF
SP GR UR STRIP.AUTO: 1.01 (ref 1–1.03)
UROBILINOGEN UR STRIP-ACNC: <2 MG/DL
WBC # BLD AUTO: 8.57 THOUSAND/UL (ref 4.31–10.16)
WBC #/AREA URNS AUTO: ABNORMAL /HPF

## 2025-05-20 PROCEDURE — 36415 COLL VENOUS BLD VENIPUNCTURE: CPT

## 2025-05-20 PROCEDURE — 85025 COMPLETE CBC W/AUTO DIFF WBC: CPT

## 2025-06-02 ENCOUNTER — OFFICE VISIT (OUTPATIENT)
Dept: DENTISTRY | Facility: CLINIC | Age: 40
End: 2025-06-02

## 2025-06-02 VITALS — HEART RATE: 95 BPM | TEMPERATURE: 99.5 F | SYSTOLIC BLOOD PRESSURE: 136 MMHG | DIASTOLIC BLOOD PRESSURE: 97 MMHG

## 2025-06-02 DIAGNOSIS — K02.9 DENTAL CARIES: Primary | ICD-10-CM

## 2025-06-02 PROCEDURE — D1354 INTERIM CARIES ARRESTING MEDICAMENT APPLICATION - PER TOOTH: HCPCS

## 2025-06-13 ENCOUNTER — OFFICE VISIT (OUTPATIENT)
Dept: DENTISTRY | Facility: CLINIC | Age: 40
End: 2025-06-13

## 2025-06-13 VITALS — SYSTOLIC BLOOD PRESSURE: 132 MMHG | DIASTOLIC BLOOD PRESSURE: 88 MMHG | TEMPERATURE: 99.1 F | HEART RATE: 103 BPM

## 2025-06-13 DIAGNOSIS — Z01.20 ENCOUNTER FOR DENTAL EXAMINATION: Primary | ICD-10-CM

## 2025-06-13 PROCEDURE — D1354 INTERIM CARIES ARRESTING MEDICAMENT APPLICATION - PER TOOTH: HCPCS

## 2025-06-18 ENCOUNTER — TELEPHONE (OUTPATIENT)
Dept: FAMILY MEDICINE CLINIC | Facility: CLINIC | Age: 40
End: 2025-06-18

## 2025-06-18 NOTE — TELEPHONE ENCOUNTER
Prior authorization for L-Methylfolate 15 mg submitted through covermymeds.    CoverMyMeds Key:[id number]  Epic Case ID:

## 2025-06-19 NOTE — TELEPHONE ENCOUNTER
Prior authorization for L-MethylFolate 15 mg has been  approved. Letter faxed to pt pharmacy and pt contacted via [employer]

## 2025-06-23 NOTE — PROGRESS NOTES
Procedure Details  16  - INTERIM CARIES ARRESTING MEDICAMENT APPLICATION - PER TOOTH  21  - INTERIM CARIES ARRESTING MEDICAMENT APPLICATION - PER TOOTH  Silver Diamine Fluoride    Delmer Giordano 40 y.o. male presents with Caregiver  for SDF application.  PMH reviewed, no changes, ASA ASA 2 - Patient with mild systemic disease with no functional limitations.    Diagnosis:   Caries #16 B, sensitivity #21 crown margin    Consent:  Risks of specific procedure: permanent dark staining on teeth, temporary staining of extraoral and intraoral soft tissues, need for periodic reapplication, restorative procedures may still be needed in the future, .    Benefits: atraumatic way to slow/arrest caries progression when pt unable to cooperate for restorative procedures, .  Alternatives: no tx.  Tx plan for SDF reviewed. Opportunity to ask questions given, all questions answered to degree of medical and dental certainty.  Patient understands and consent given byCaregiver via verbal consent.    Procedure details:  Teeth cleaned with  and prophy cup/paste.  Applied Vaseline to face and lips.  Isolation: Cotton rolls and High volume suction.  Dried teeth with gauze and air.  Applied 38% SDF using microbrush and floss. Fl2 varnish applied over treatment area.  Post-op instructions given verbally: no eating or drinking for 30 minutes.    Patient dismissed ambulatory and alert.    NV: Raya, 45 min, #16 B, 21 crown margin SDF follow up.

## 2025-06-23 NOTE — DENTAL PROCEDURE DETAILS
Silver Diamine Fluoride    Delmer Giordano 40 y.o. male presents with Caregiver  for SDF application.  PMH reviewed, no changes, ASA ASA 2 - Patient with mild systemic disease with no functional limitations.    Diagnosis:   Caries #16 B, sensitivity #21 crown margin    Consent:  Risks of specific procedure: permanent dark staining on teeth, temporary staining of extraoral and intraoral soft tissues, need for periodic reapplication, restorative procedures may still be needed in the future, .    Benefits: atraumatic way to slow/arrest caries progression when pt unable to cooperate for restorative procedures, .  Alternatives: no tx.  Tx plan for SDF reviewed. Opportunity to ask questions given, all questions answered to degree of medical and dental certainty.  Patient understands and consent given byCaregiver via verbal consent.    Procedure details:  Teeth cleaned with  and prophy cup/paste.  Applied Vaseline to face and lips.  Isolation: Cotton rolls and High volume suction.  Dried teeth with gauze and air.  Applied 38% SDF using microbrush and floss. Fl2 varnish applied over treatment area.  Post-op instructions given verbally: no eating or drinking for 30 minutes.    Patient dismissed ambulatory and alert.    NV: Raya, 45 min, #16 B, 21 crown margin SDF follow up.

## 2025-07-02 NOTE — PROGRESS NOTES
Procedure Details   - RE-EVALUATION - POST-OPERATIVE OFFICE VISIT  Silver Diamine Fluoride    Delmer Giordano 40 y.o. male presents with Caregiver  for SDF application follow up. Upon clinical exam, second application warranted.  PMH reviewed, no changes, ASA ASA 2 - Patient with mild systemic disease with no functional limitations.     Diagnosis:   Caries #16 B    Consent:  Risks of specific procedure: permanent dark staining on teeth, temporary staining of extraoral and intraoral soft tissues, need for periodic reapplication, restorative procedures may still be needed in the future.    Benefits: atraumatic way to slow/arrest caries progression when pt unable to cooperate for restorative procedures.  Alternatives:  no tx.  Tx plan for SDF reviewed. Opportunity to ask questions given, all questions answered to degree of medical and dental certainty.  Patient understands and consent given byCaregiver via verbal consent.    Procedure details:  Teeth cleaned with  and prophy cup/paste.  Applied Vaseline to face and lips.  Isolation: Cotton rolls.  Dried teeth with gauze and air.  Applied 38% SDF using microbrush and floss. Applied Fl2 over treatment site.  Post-op instructions given verbally: no eating or drinking for 30 minutes.    Patient dismissed ambulatory and alert.    NV: recall.

## 2025-07-02 NOTE — DENTAL PROCEDURE DETAILS
Silver Diamine Fluoride    Delmer Giordano 40 y.o. male presents with Caregiver  for SDF application follow up. Upon clinical exam, second application warranted.  PMH reviewed, no changes, ASA ASA 2 - Patient with mild systemic disease with no functional limitations.     Diagnosis:   Caries #16 B    Consent:  Risks of specific procedure: permanent dark staining on teeth, temporary staining of extraoral and intraoral soft tissues, need for periodic reapplication, restorative procedures may still be needed in the future.    Benefits: atraumatic way to slow/arrest caries progression when pt unable to cooperate for restorative procedures.  Alternatives:  no tx.  Tx plan for SDF reviewed. Opportunity to ask questions given, all questions answered to degree of medical and dental certainty.  Patient understands and consent given byCaregiver via verbal consent.    Procedure details:  Teeth cleaned with  and prophy cup/paste.  Applied Vaseline to face and lips.  Isolation: Cotton rolls.  Dried teeth with gauze and air.  Applied 38% SDF using microbrush and floss. Applied Fl2 over treatment site.  Post-op instructions given verbally: no eating or drinking for 30 minutes.    Patient dismissed ambulatory and alert.    NV: recall.

## 2025-07-16 DIAGNOSIS — T14.8XXA ABRASION: ICD-10-CM

## 2025-07-16 RX ORDER — BACITRACIN ZINC, NEOMYCIN SULFATE, AND POLYMYXIN B SULFATE 400; 3.5; 5 [IU]/G; MG/G; [IU]/G
3.5 OINTMENT TOPICAL 2 TIMES DAILY PRN
OUTPATIENT
Start: 2025-07-16

## 2025-07-28 ENCOUNTER — TELEPHONE (OUTPATIENT)
Dept: FAMILY MEDICINE CLINIC | Facility: CLINIC | Age: 40
End: 2025-07-28

## 2025-07-28 ENCOUNTER — OFFICE VISIT (OUTPATIENT)
Dept: FAMILY MEDICINE CLINIC | Facility: CLINIC | Age: 40
End: 2025-07-28

## 2025-07-28 VITALS
TEMPERATURE: 99.6 F | HEIGHT: 69 IN | SYSTOLIC BLOOD PRESSURE: 137 MMHG | OXYGEN SATURATION: 98 % | RESPIRATION RATE: 16 BRPM | BODY MASS INDEX: 36.43 KG/M2 | WEIGHT: 246 LBS | DIASTOLIC BLOOD PRESSURE: 91 MMHG | HEART RATE: 102 BPM

## 2025-07-28 DIAGNOSIS — E66.9 OBESITY (BMI 35.0-39.9 WITHOUT COMORBIDITY): ICD-10-CM

## 2025-07-28 DIAGNOSIS — I10 ESSENTIAL HYPERTENSION: ICD-10-CM

## 2025-07-28 DIAGNOSIS — Z00.00 MEDICARE ANNUAL WELLNESS VISIT, SUBSEQUENT: Primary | ICD-10-CM

## 2025-07-28 DIAGNOSIS — T14.8XXA ABRASION: Primary | ICD-10-CM

## 2025-07-28 DIAGNOSIS — J30.9 ALLERGIC CONJUNCTIVITIS OF BOTH EYES AND RHINITIS: ICD-10-CM

## 2025-07-28 DIAGNOSIS — H10.13 ALLERGIC CONJUNCTIVITIS OF BOTH EYES AND RHINITIS: ICD-10-CM

## 2025-07-28 PROCEDURE — G0402 INITIAL PREVENTIVE EXAM: HCPCS | Performed by: FAMILY MEDICINE

## 2025-07-28 RX ORDER — CETIRIZINE HYDROCHLORIDE 5 MG/1
10 TABLET, CHEWABLE ORAL
Start: 2025-07-28

## 2025-07-28 RX ORDER — AMLODIPINE BESYLATE 10 MG/1
10 TABLET ORAL
Start: 2025-07-28

## 2025-07-28 RX ORDER — GINSENG 100 MG
1 CAPSULE ORAL 2 TIMES DAILY PRN
Qty: 14 G | Refills: 2 | Status: SHIPPED | OUTPATIENT
Start: 2025-07-28

## 2025-07-29 ENCOUNTER — TELEPHONE (OUTPATIENT)
Dept: FAMILY MEDICINE CLINIC | Facility: CLINIC | Age: 40
End: 2025-07-29